# Patient Record
Sex: FEMALE | Race: WHITE | NOT HISPANIC OR LATINO | Employment: OTHER | URBAN - METROPOLITAN AREA
[De-identification: names, ages, dates, MRNs, and addresses within clinical notes are randomized per-mention and may not be internally consistent; named-entity substitution may affect disease eponyms.]

---

## 2017-01-10 ENCOUNTER — RESULTS ENCOUNTER (OUTPATIENT)
Dept: FAMILY MEDICINE CLINIC | Facility: CLINIC | Age: 62
End: 2017-01-10

## 2017-01-10 DIAGNOSIS — M85.80 OSTEOPENIA: ICD-10-CM

## 2017-01-10 DIAGNOSIS — K21.9 GASTROESOPHAGEAL REFLUX DISEASE WITHOUT ESOPHAGITIS: ICD-10-CM

## 2017-01-10 DIAGNOSIS — Z85.3 PERSONAL HISTORY OF MALIGNANT NEOPLASM OF BREAST: ICD-10-CM

## 2017-01-10 DIAGNOSIS — E78.5 HYPERLIPIDEMIA: ICD-10-CM

## 2017-01-10 DIAGNOSIS — N28.9 RENAL INSUFFICIENCY: ICD-10-CM

## 2017-01-10 DIAGNOSIS — R11.0 NAUSEA: ICD-10-CM

## 2017-02-06 LAB
25(OH)D3+25(OH)D2 SERPL-MCNC: 50.2 NG/ML (ref 30–100)
BUN SERPL-MCNC: 18 MG/DL (ref 8–23)
BUN/CREAT SERPL: 18.8 (ref 7–25)
CALCIUM SERPL-MCNC: 10.1 MG/DL (ref 8.6–10.5)
CHLORIDE SERPL-SCNC: 102 MMOL/L (ref 98–107)
CHOLEST SERPL-MCNC: 195 MG/DL (ref 0–200)
CO2 SERPL-SCNC: 26.3 MMOL/L (ref 22–29)
CREAT SERPL-MCNC: 0.96 MG/DL (ref 0.57–1)
GLUCOSE SERPL-MCNC: 91 MG/DL (ref 65–99)
HDLC SERPL-MCNC: 69 MG/DL (ref 40–60)
LDLC SERPL CALC-MCNC: 106 MG/DL (ref 0–100)
POTASSIUM SERPL-SCNC: 5 MMOL/L (ref 3.5–5.2)
SODIUM SERPL-SCNC: 142 MMOL/L (ref 136–145)
TRIGL SERPL-MCNC: 98 MG/DL (ref 0–150)
VLDLC SERPL CALC-MCNC: 19.6 MG/DL (ref 5–40)

## 2017-02-14 ENCOUNTER — OFFICE VISIT (OUTPATIENT)
Dept: FAMILY MEDICINE CLINIC | Facility: CLINIC | Age: 62
End: 2017-02-14

## 2017-02-14 VITALS
BODY MASS INDEX: 23.56 KG/M2 | TEMPERATURE: 97.5 F | SYSTOLIC BLOOD PRESSURE: 114 MMHG | DIASTOLIC BLOOD PRESSURE: 72 MMHG | OXYGEN SATURATION: 94 % | HEART RATE: 76 BPM | RESPIRATION RATE: 16 BRPM | WEIGHT: 138 LBS | HEIGHT: 64 IN

## 2017-02-14 DIAGNOSIS — R11.0 NAUSEA: ICD-10-CM

## 2017-02-14 DIAGNOSIS — K21.9 GASTROESOPHAGEAL REFLUX DISEASE WITHOUT ESOPHAGITIS: ICD-10-CM

## 2017-02-14 DIAGNOSIS — E78.5 HYPERLIPIDEMIA, UNSPECIFIED HYPERLIPIDEMIA TYPE: ICD-10-CM

## 2017-02-14 DIAGNOSIS — Z23 IMMUNIZATION DUE: ICD-10-CM

## 2017-02-14 DIAGNOSIS — Z71.85 IMMUNIZATION COUNSELING: ICD-10-CM

## 2017-02-14 DIAGNOSIS — N28.9 RENAL INSUFFICIENCY: Primary | ICD-10-CM

## 2017-02-14 PROCEDURE — 99214 OFFICE O/P EST MOD 30 MIN: CPT | Performed by: FAMILY MEDICINE

## 2017-02-14 PROCEDURE — 90471 IMMUNIZATION ADMIN: CPT | Performed by: FAMILY MEDICINE

## 2017-02-14 PROCEDURE — 90736 HZV VACCINE LIVE SUBQ: CPT | Performed by: FAMILY MEDICINE

## 2017-02-14 RX ORDER — MELATONIN
1000 EVERY OTHER DAY
COMMUNITY

## 2017-02-14 RX ORDER — OMEPRAZOLE 20 MG/1
CAPSULE, DELAYED RELEASE ORAL
Qty: 90 CAPSULE | Refills: 1 | OUTPATIENT
Start: 2017-02-14

## 2017-02-14 RX ORDER — SODIUM BICARBONATE 650 MG/1
650 TABLET ORAL 2 TIMES DAILY
Qty: 180 TABLET | Refills: 1 | Status: SHIPPED | OUTPATIENT
Start: 2017-02-14 | End: 2017-09-12 | Stop reason: SDUPTHER

## 2017-02-14 RX ORDER — OMEPRAZOLE 20 MG/1
20 CAPSULE, DELAYED RELEASE ORAL DAILY
Qty: 90 CAPSULE | Refills: 1 | Status: SHIPPED | OUTPATIENT
Start: 2017-02-14 | End: 2017-08-13 | Stop reason: SDUPTHER

## 2017-02-14 RX ORDER — PROMETHAZINE HYDROCHLORIDE 25 MG/1
25 TABLET ORAL AS NEEDED
Qty: 20 TABLET | Refills: 1 | Status: SHIPPED | OUTPATIENT
Start: 2017-02-14 | End: 2017-05-09

## 2017-02-14 NOTE — PROGRESS NOTES
"Subjective   Thuy Blackwood is a 61 y.o. female.     History of Present Illness   Chief Complaint:   Chief Complaint   Patient presents with   • Heartburn   • Hyperlipidemia   • Renal Insufficiency   • Nausea       Thuy Blackwood 61 y.o. female who presents today for Medical Management of the below listed issues and medication refills  Patient needs shingle shot today. I reviewed her lab results  she has a history of   Patient Active Problem List   Diagnosis   • Hyperlipidemia   • Osteopenia   • History of left breast cancer   • Renal insufficiency   .  Since the last visit, she has overall felt well.  she has been compliant with   Current Outpatient Prescriptions:   •  Calcium Carb-Cholecalciferol (CALCIUM 500 +D PO), Take 500 mg by mouth daily., Disp: , Rfl:   •  Multiple Vitamin (MULTI-VITAMIN DAILY PO), Take by mouth daily., Disp: , Rfl:   •  omeprazole (PriLOSEC) 20 MG capsule, Take 1 capsule by mouth daily., Disp: 90 capsule, Rfl: 1  •  promethazine (PHENERGAN) 25 MG tablet, TAKE 1 TABLET BY MOUTH DAILY AS NEEDED FOR NAUSEA OR VOMITING., Disp: 20 tablet, Rfl: 1  •  sodium bicarbonate 650 MG tablet, Take 1 tablet by mouth 2 (two) times a day., Disp: 180 tablet, Rfl: 1.  she denies medication side effects.    All of the chronic condition(s) listed above are stable w/o issues.    Visit Vitals   • /72   • Pulse 76   • Temp 97.5 °F (36.4 °C) (Oral)   • Resp 16   • Ht 64.17\" (163 cm)   • Wt 138 lb (62.6 kg)   • SpO2 94%   • BMI 23.56 kg/m2       Results for orders placed or performed in visit on 01/10/17   BMP8+eGFR ([facility] Only)   Result Value Ref Range    Glucose 91 65 - 99 mg/dL    BUN 18 8 - 23 mg/dL    Creatinine 0.96 0.57 - 1.00 mg/dL    eGFR Non African Am 59 (L) >60 mL/min/1.73    eGFR African Am 71 >60 mL/min/1.73    BUN/Creatinine Ratio 18.8 7.0 - 25.0    Sodium 142 136 - 145 mmol/L    Potassium 5.0 3.5 - 5.2 mmol/L    Chloride 102 98 - 107 mmol/L    Total CO2 26.3 22.0 - 29.0 mmol/L    " Calcium 10.1 8.6 - 10.5 mg/dL   Vitamin D 25 hydroxy   Result Value Ref Range    25 Hydroxy, Vitamin D 50.2 30.0 - 100.0 ng/mL   Lipid panel   Result Value Ref Range    Total Cholesterol 195 0 - 200 mg/dL    Triglycerides 98 0 - 150 mg/dL    HDL Cholesterol 69 (H) 40 - 60 mg/dL    VLDL Cholesterol 19.6 5 - 40 mg/dL    LDL Cholesterol  106 (H) 0 - 100 mg/dL         The following portions of the patient's history were reviewed and updated as appropriate: allergies, current medications, past family history, past medical history, past social history, past surgical history and problem list.    Review of Systems   Constitutional: Negative for activity change, appetite change and unexpected weight change.   Eyes: Negative for visual disturbance.   Respiratory: Negative for chest tightness and shortness of breath.    Cardiovascular: Negative for chest pain and palpitations.   Skin: Negative for color change.   Neurological: Negative for syncope and speech difficulty.   Psychiatric/Behavioral: Negative for confusion and decreased concentration.       Objective   Physical Exam   Constitutional: She is oriented to person, place, and time. She appears well-developed and well-nourished.   HENT:   Head: Normocephalic and atraumatic.   Nose: Nose normal.   Mouth/Throat: Oropharynx is clear and moist.   Eyes: EOM are normal. Pupils are equal, round, and reactive to light.   Neck: Normal range of motion. Neck supple. No thyromegaly present.   Cardiovascular: Normal rate and regular rhythm.    Pulmonary/Chest: Effort normal and breath sounds normal. She has no rales.   Abdominal: Soft. Bowel sounds are normal.   Musculoskeletal: Normal range of motion.   Neurological: She is alert and oriented to person, place, and time.   Skin: Skin is warm. No rash noted.   Psychiatric: She has a normal mood and affect. Her behavior is normal.   Nursing note and vitals reviewed.      Assessment/Plan   Thuy was seen today for heartburn,  hyperlipidemia, renal insufficiency and nausea.    Diagnoses and all orders for this visit:    Renal insufficiency  -     sodium bicarbonate 650 MG tablet; Take 1 tablet by mouth 2 (Two) Times a Day.  -     Comprehensive metabolic panel; Future  -     Lipid panel; Future  -     CBC and Differential; Future  -     TSH; Future    Gastroesophageal reflux disease without esophagitis  -     omeprazole (priLOSEC) 20 MG capsule; Take 1 capsule by mouth Daily.  -     Comprehensive metabolic panel; Future  -     Lipid panel; Future  -     CBC and Differential; Future  -     TSH; Future    Hyperlipidemia, unspecified hyperlipidemia type  -     Comprehensive metabolic panel; Future  -     Lipid panel; Future  -     CBC and Differential; Future  -     TSH; Future    Nausea  -     promethazine (PHENERGAN) 25 MG tablet; Take 1 tablet by mouth As Needed for nausea or vomiting.  -     Comprehensive metabolic panel; Future  -     Lipid panel; Future  -     CBC and Differential; Future  -     TSH; Future    Immunization counseling

## 2017-05-02 ENCOUNTER — OFFICE VISIT (OUTPATIENT)
Dept: FAMILY MEDICINE CLINIC | Facility: CLINIC | Age: 62
End: 2017-05-02

## 2017-05-02 VITALS
TEMPERATURE: 98.1 F | BODY MASS INDEX: 23.73 KG/M2 | WEIGHT: 139 LBS | OXYGEN SATURATION: 99 % | SYSTOLIC BLOOD PRESSURE: 140 MMHG | DIASTOLIC BLOOD PRESSURE: 80 MMHG | RESPIRATION RATE: 16 BRPM | HEIGHT: 64 IN | HEART RATE: 98 BPM

## 2017-05-02 DIAGNOSIS — J18.9 PNEUMONIA OF LEFT LOWER LOBE DUE TO INFECTIOUS ORGANISM: ICD-10-CM

## 2017-05-02 DIAGNOSIS — R11.0 NAUSEA: ICD-10-CM

## 2017-05-02 DIAGNOSIS — J06.9 ACUTE URI: Primary | ICD-10-CM

## 2017-05-02 DIAGNOSIS — R05.9 COUGH: ICD-10-CM

## 2017-05-02 DIAGNOSIS — R51.9 HEADACHE, UNSPECIFIED HEADACHE TYPE: ICD-10-CM

## 2017-05-02 DIAGNOSIS — R53.83 OTHER FATIGUE: ICD-10-CM

## 2017-05-02 DIAGNOSIS — J32.0 MAXILLARY SINUSITIS, UNSPECIFIED CHRONICITY: ICD-10-CM

## 2017-05-02 LAB
EXPIRATION DATE: NORMAL
FLUAV AG NPH QL: NEGATIVE
FLUBV AG NPH QL: NEGATIVE
INTERNAL CONTROL: NORMAL
Lab: NORMAL

## 2017-05-02 PROCEDURE — 71020 XR CHEST PA AND LATERAL: CPT | Performed by: FAMILY MEDICINE

## 2017-05-02 PROCEDURE — 99214 OFFICE O/P EST MOD 30 MIN: CPT | Performed by: FAMILY MEDICINE

## 2017-05-02 PROCEDURE — 87804 INFLUENZA ASSAY W/OPTIC: CPT | Performed by: FAMILY MEDICINE

## 2017-05-02 RX ORDER — CEFDINIR 300 MG/1
300 CAPSULE ORAL 2 TIMES DAILY
Qty: 20 CAPSULE | Refills: 0 | Status: SHIPPED | OUTPATIENT
Start: 2017-05-02 | End: 2017-09-12

## 2017-05-09 ENCOUNTER — OFFICE VISIT (OUTPATIENT)
Dept: FAMILY MEDICINE CLINIC | Facility: CLINIC | Age: 62
End: 2017-05-09

## 2017-05-09 VITALS
SYSTOLIC BLOOD PRESSURE: 111 MMHG | HEIGHT: 64 IN | OXYGEN SATURATION: 99 % | DIASTOLIC BLOOD PRESSURE: 59 MMHG | BODY MASS INDEX: 23.56 KG/M2 | HEART RATE: 84 BPM | TEMPERATURE: 97 F | WEIGHT: 138 LBS | RESPIRATION RATE: 16 BRPM

## 2017-05-09 DIAGNOSIS — J40 BRONCHITIS: Primary | ICD-10-CM

## 2017-05-09 PROCEDURE — 99213 OFFICE O/P EST LOW 20 MIN: CPT | Performed by: FAMILY MEDICINE

## 2017-05-09 RX ORDER — ONDANSETRON 4 MG/1
TABLET, FILM COATED ORAL
Refills: 1 | COMMUNITY
Start: 2017-05-04 | End: 2017-05-09

## 2017-06-19 DIAGNOSIS — R11.0 NAUSEA: ICD-10-CM

## 2017-06-19 RX ORDER — PROMETHAZINE HYDROCHLORIDE 25 MG/1
TABLET ORAL
Qty: 20 TABLET | Refills: 1 | OUTPATIENT
Start: 2017-06-19

## 2017-07-11 ENCOUNTER — APPOINTMENT (OUTPATIENT)
Dept: WOMENS IMAGING | Facility: HOSPITAL | Age: 62
End: 2017-07-11

## 2017-07-11 PROCEDURE — G0202 SCR MAMMO BI INCL CAD: HCPCS | Performed by: RADIOLOGY

## 2017-07-11 PROCEDURE — MDREVIEWSP: Performed by: RADIOLOGY

## 2017-07-11 PROCEDURE — 77063 BREAST TOMOSYNTHESIS BI: CPT | Performed by: RADIOLOGY

## 2017-07-11 PROCEDURE — 77067 SCR MAMMO BI INCL CAD: CPT | Performed by: RADIOLOGY

## 2017-07-11 PROCEDURE — 76641 ULTRASOUND BREAST COMPLETE: CPT | Performed by: RADIOLOGY

## 2017-07-14 ENCOUNTER — RESULTS ENCOUNTER (OUTPATIENT)
Dept: FAMILY MEDICINE CLINIC | Facility: CLINIC | Age: 62
End: 2017-07-14

## 2017-07-14 DIAGNOSIS — E78.5 HYPERLIPIDEMIA, UNSPECIFIED HYPERLIPIDEMIA TYPE: ICD-10-CM

## 2017-07-14 DIAGNOSIS — N28.9 RENAL INSUFFICIENCY: ICD-10-CM

## 2017-07-14 DIAGNOSIS — K21.9 GASTROESOPHAGEAL REFLUX DISEASE WITHOUT ESOPHAGITIS: ICD-10-CM

## 2017-07-14 DIAGNOSIS — R11.0 NAUSEA: ICD-10-CM

## 2017-08-13 DIAGNOSIS — K21.9 GASTROESOPHAGEAL REFLUX DISEASE WITHOUT ESOPHAGITIS: ICD-10-CM

## 2017-08-15 RX ORDER — OMEPRAZOLE 20 MG/1
CAPSULE, DELAYED RELEASE ORAL
Qty: 30 CAPSULE | Refills: 0 | Status: SHIPPED | OUTPATIENT
Start: 2017-08-15 | End: 2018-03-12 | Stop reason: SDUPTHER

## 2017-09-05 LAB
ALBUMIN SERPL-MCNC: 4.8 G/DL (ref 3.5–5.2)
ALBUMIN/GLOB SERPL: 1.8 G/DL
ALP SERPL-CCNC: 58 U/L (ref 39–117)
ALT SERPL-CCNC: 15 U/L (ref 1–33)
AST SERPL-CCNC: 22 U/L (ref 1–32)
BASOPHILS # BLD AUTO: 0.03 10*3/MM3 (ref 0–0.2)
BASOPHILS NFR BLD AUTO: 0.6 % (ref 0–1.5)
BILIRUB SERPL-MCNC: 0.5 MG/DL (ref 0.1–1.2)
BUN SERPL-MCNC: 17 MG/DL (ref 8–23)
BUN/CREAT SERPL: 17 (ref 7–25)
CALCIUM SERPL-MCNC: 10.3 MG/DL (ref 8.6–10.5)
CHLORIDE SERPL-SCNC: 100 MMOL/L (ref 98–107)
CHOLEST SERPL-MCNC: 211 MG/DL (ref 0–200)
CO2 SERPL-SCNC: 23.7 MMOL/L (ref 22–29)
CREAT SERPL-MCNC: 1 MG/DL (ref 0.57–1)
EOSINOPHIL # BLD AUTO: 0.2 10*3/MM3 (ref 0–0.7)
EOSINOPHIL NFR BLD AUTO: 4 % (ref 0.3–6.2)
ERYTHROCYTE [DISTWIDTH] IN BLOOD BY AUTOMATED COUNT: 13.4 % (ref 11.7–13)
GLOBULIN SER CALC-MCNC: 2.6 GM/DL
GLUCOSE SERPL-MCNC: 81 MG/DL (ref 65–99)
HCT VFR BLD AUTO: 39.5 % (ref 35.6–45.5)
HDLC SERPL-MCNC: 59 MG/DL (ref 40–60)
HGB BLD-MCNC: 12.7 G/DL (ref 11.9–15.5)
IMM GRANULOCYTES # BLD: 0 10*3/MM3 (ref 0–0.03)
IMM GRANULOCYTES NFR BLD: 0 % (ref 0–0.5)
LDLC SERPL CALC-MCNC: 132 MG/DL (ref 0–100)
LYMPHOCYTES # BLD AUTO: 0.99 10*3/MM3 (ref 0.9–4.8)
LYMPHOCYTES NFR BLD AUTO: 19.9 % (ref 19.6–45.3)
MCH RBC QN AUTO: 31.6 PG (ref 26.9–32)
MCHC RBC AUTO-ENTMCNC: 32.2 G/DL (ref 32.4–36.3)
MCV RBC AUTO: 98.3 FL (ref 80.5–98.2)
MONOCYTES # BLD AUTO: 0.46 10*3/MM3 (ref 0.2–1.2)
MONOCYTES NFR BLD AUTO: 9.2 % (ref 5–12)
NEUTROPHILS # BLD AUTO: 3.3 10*3/MM3 (ref 1.9–8.1)
NEUTROPHILS NFR BLD AUTO: 66.3 % (ref 42.7–76)
PLATELET # BLD AUTO: 287 10*3/MM3 (ref 140–500)
POTASSIUM SERPL-SCNC: 4.4 MMOL/L (ref 3.5–5.2)
PROT SERPL-MCNC: 7.4 G/DL (ref 6–8.5)
RBC # BLD AUTO: 4.02 10*6/MM3 (ref 3.9–5.2)
SODIUM SERPL-SCNC: 138 MMOL/L (ref 136–145)
TRIGL SERPL-MCNC: 102 MG/DL (ref 0–150)
TSH SERPL DL<=0.005 MIU/L-ACNC: 1.89 MIU/ML (ref 0.27–4.2)
VLDLC SERPL CALC-MCNC: 20.4 MG/DL (ref 5–40)
WBC # BLD AUTO: 4.98 10*3/MM3 (ref 4.5–10.7)

## 2017-09-12 ENCOUNTER — OFFICE VISIT (OUTPATIENT)
Dept: FAMILY MEDICINE CLINIC | Facility: CLINIC | Age: 62
End: 2017-09-12

## 2017-09-12 VITALS
HEART RATE: 80 BPM | BODY MASS INDEX: 23.39 KG/M2 | WEIGHT: 137 LBS | TEMPERATURE: 97.6 F | HEIGHT: 64 IN | RESPIRATION RATE: 16 BRPM | OXYGEN SATURATION: 98 % | DIASTOLIC BLOOD PRESSURE: 62 MMHG | SYSTOLIC BLOOD PRESSURE: 109 MMHG

## 2017-09-12 DIAGNOSIS — N28.9 RENAL INSUFFICIENCY: Primary | ICD-10-CM

## 2017-09-12 DIAGNOSIS — R11.0 NAUSEA: ICD-10-CM

## 2017-09-12 DIAGNOSIS — K21.9 GASTROESOPHAGEAL REFLUX DISEASE WITHOUT ESOPHAGITIS: ICD-10-CM

## 2017-09-12 PROCEDURE — 99214 OFFICE O/P EST MOD 30 MIN: CPT | Performed by: FAMILY MEDICINE

## 2017-09-12 RX ORDER — SODIUM BICARBONATE 650 MG/1
650 TABLET ORAL 2 TIMES DAILY
Qty: 180 TABLET | Refills: 1 | Status: SHIPPED | OUTPATIENT
Start: 2017-09-12 | End: 2018-03-12 | Stop reason: SDUPTHER

## 2017-09-12 RX ORDER — PROMETHAZINE HYDROCHLORIDE 25 MG/1
TABLET ORAL
Qty: 56 TABLET | Refills: 1 | Status: SHIPPED | OUTPATIENT
Start: 2017-09-12 | End: 2018-03-12 | Stop reason: SDUPTHER

## 2017-09-12 RX ORDER — OMEPRAZOLE 20 MG/1
20 CAPSULE, DELAYED RELEASE ORAL DAILY
Qty: 90 CAPSULE | Refills: 1 | Status: CANCELLED | OUTPATIENT
Start: 2017-09-12

## 2017-09-12 RX ORDER — PROMETHAZINE HYDROCHLORIDE 25 MG/1
TABLET ORAL
Refills: 1 | COMMUNITY
Start: 2017-06-19 | End: 2017-09-12 | Stop reason: SDUPTHER

## 2017-09-14 DIAGNOSIS — N28.9 RENAL INSUFFICIENCY: ICD-10-CM

## 2017-09-14 RX ORDER — SODIUM BICARBONATE 650 MG/1
TABLET ORAL
Qty: 180 TABLET | Refills: 1 | OUTPATIENT
Start: 2017-09-14

## 2017-10-19 ENCOUNTER — FLU SHOT (OUTPATIENT)
Dept: FAMILY MEDICINE CLINIC | Facility: CLINIC | Age: 62
End: 2017-10-19

## 2017-10-19 PROCEDURE — 90471 IMMUNIZATION ADMIN: CPT | Performed by: FAMILY MEDICINE

## 2017-10-19 PROCEDURE — 90686 IIV4 VACC NO PRSV 0.5 ML IM: CPT | Performed by: FAMILY MEDICINE

## 2018-01-31 ENCOUNTER — LAB (OUTPATIENT)
Dept: LAB | Facility: HOSPITAL | Age: 63
End: 2018-01-31

## 2018-01-31 ENCOUNTER — OFFICE VISIT (OUTPATIENT)
Dept: ONCOLOGY | Facility: CLINIC | Age: 63
End: 2018-01-31

## 2018-01-31 VITALS
SYSTOLIC BLOOD PRESSURE: 128 MMHG | TEMPERATURE: 97.8 F | RESPIRATION RATE: 16 BRPM | DIASTOLIC BLOOD PRESSURE: 80 MMHG | HEIGHT: 65 IN | HEART RATE: 80 BPM | OXYGEN SATURATION: 100 % | WEIGHT: 140.4 LBS | BODY MASS INDEX: 23.39 KG/M2

## 2018-01-31 DIAGNOSIS — Z85.3 HISTORY OF LEFT BREAST CANCER: Primary | ICD-10-CM

## 2018-01-31 LAB
BASOPHILS # BLD AUTO: 0.05 10*3/MM3 (ref 0–0.1)
BASOPHILS NFR BLD AUTO: 1.1 % (ref 0–1.1)
DEPRECATED RDW RBC AUTO: 44.1 FL (ref 37–49)
EOSINOPHIL # BLD AUTO: 0.15 10*3/MM3 (ref 0–0.36)
EOSINOPHIL NFR BLD AUTO: 3.2 % (ref 1–5)
ERYTHROCYTE [DISTWIDTH] IN BLOOD BY AUTOMATED COUNT: 12.7 % (ref 11.7–14.5)
HCT VFR BLD AUTO: 40.7 % (ref 34–45)
HGB BLD-MCNC: 13.7 G/DL (ref 11.5–14.9)
IMM GRANULOCYTES # BLD: 0.02 10*3/MM3 (ref 0–0.03)
IMM GRANULOCYTES NFR BLD: 0.4 % (ref 0–0.5)
LYMPHOCYTES # BLD AUTO: 1.11 10*3/MM3 (ref 1–3.5)
LYMPHOCYTES NFR BLD AUTO: 23.4 % (ref 20–49)
MCH RBC QN AUTO: 31.6 PG (ref 27–33)
MCHC RBC AUTO-ENTMCNC: 33.7 G/DL (ref 32–35)
MCV RBC AUTO: 93.8 FL (ref 83–97)
MONOCYTES # BLD AUTO: 0.51 10*3/MM3 (ref 0.25–0.8)
MONOCYTES NFR BLD AUTO: 10.8 % (ref 4–12)
NEUTROPHILS # BLD AUTO: 2.9 10*3/MM3 (ref 1.5–7)
NEUTROPHILS NFR BLD AUTO: 61.1 % (ref 39–75)
NRBC BLD MANUAL-RTO: 0 /100 WBC (ref 0–0)
PLATELET # BLD AUTO: 275 10*3/MM3 (ref 150–375)
PMV BLD AUTO: 9.5 FL (ref 8.9–12.1)
RBC # BLD AUTO: 4.34 10*6/MM3 (ref 3.9–5)
WBC NRBC COR # BLD: 4.74 10*3/MM3 (ref 4–10)

## 2018-01-31 PROCEDURE — 99213 OFFICE O/P EST LOW 20 MIN: CPT | Performed by: INTERNAL MEDICINE

## 2018-01-31 PROCEDURE — 36416 COLLJ CAPILLARY BLOOD SPEC: CPT | Performed by: INTERNAL MEDICINE

## 2018-01-31 PROCEDURE — 85025 COMPLETE CBC W/AUTO DIFF WBC: CPT | Performed by: INTERNAL MEDICINE

## 2018-01-31 NOTE — PROGRESS NOTES
REASON FOR FOLLOWUP:  History of left breast cancer 27 years ago.  Left chest wall remains normal.  Right breast examination is normal.          HISTORY OF PRESENT ILLNESS:  The patient returns today to the office for followup.    Dr. Jones continues checking her creatinine twice a year, this being stable. The patient, otherwise, has no other problems.        PAST MEDICAL HISTORY:  Menarche age 9.   3, para 2, miscarriage 1.  Pregnancy  age 26.  She took birth control pills on and off for 10 years and underwent bilateral tubal ligation in .  Hysterectomy with ovaries in situ.        Bone density test review on 2009 documented further decrease in bone density of almost 2.5% bone loss throughout her skeleton.  W  ith this in mind we advised her to undergo evaluation for osteoporosis that would include a magnesium phosphorus, vitamin D level and serum protein electrophoresis and sedimentation rate.  The patient acknowledged that she does not take any calcium or vit  amin D.  Her diet is balanced though.          She developed renal insufficiency with creatinine of 3.0 in 2010, and inositoluria.  Doppler study of the left kidney showed minimal decrease in size, with normal perfusion through both kidneys.          Laparoscopic cholecystectomy performed in 2010.  Pathology showed chronic cholecystitis.  Upper and lower GI endoscopies performed by Dr. Lawton in 2010 showed gastritis but no other pathological diagnosis made.          HEMATOLOGIC/ONCOLOGIC HISTORY: (History from previous dates can be found in the separate document.)        .           MEDICATIONS:  T  he current medication list was reviewed with the patient and updated in the EMR this date per the medical assistant.  Medication dosages and frequencies were confirmed to be accurate.           ALLERGIES:   None.          SOCIAL HISTORY:  .  Otherwise unchanged.          REVIEW OF SYSTEMS:      PAIN:  See  "VITAL SIGNS below.     GENERAL:  No change in appetite or weight, no fevers, chills or sweats.     SKIN:  No rashes or nonhealing lesions.    HEME/LYMPH:  No anemia, easy bruising, bleeding or swollen nodes.    EYES:  No vision changes or diplopia.      ENT:  No tinnitus, hearing loss, gum bleeding, epistaxis, hoarseness or dysphagia.    RESPIRATORY:  No cough, shortness of breath, hemoptysis or wheezing.    CVS:  No chest pain, palpitations, orthopnea, dyspnea on exertion or PND.    GI:  No abdominal pain, nausea, vomiting, constipation, diarrhea, melena or hematochezia.    :  No dysuria or hematuria.  No abnormal vaginal discharge or bleeding.     MUSCULOSKELETAL:  No bone pain or joint stiffness.  Bursitis both hips  NEUROLOGICAL:  No dizziness, global weakness, loss of consciousness or seizures.    PSYCHIATRIC:  No increased nervousness, mood changes or depression.        VITAL SIGNS:    Vitals:    18 0952   BP: 128/80   Pulse: 80   Resp: 16   Temp: 97.8 °F (36.6 °C)   TempSrc: Oral   SpO2: 100%   Weight: 63.7 kg (140 lb 6.4 oz)   Height: 165.7 cm (65.24\")          PAIN:  0 out of 10     ECO        PHYSICAL EXAMINATION:      GENERAL:  Well-developed, well-nourished female in no acute distress.    SKIN:  Warm, dry without rashes, purpura or petechiae.    HEAD:  Normocephalic.    EYES:  Pupils equal, round and reactive to light.  EOMs intact.  Conjunctivae normal.    EARS:  Hearing intact.    NOSE:  Septum midline.  No excoriations or nasal discharge.    MOUTH:  Tongue is well-papillated; no stomatitis or ulcers.  Lips normal.    THROAT:  Oropharynx without lesions or exudates.    NECK:  Supple with good range of motion; no thyromegaly or masses, no JVD or bruits.    LYMPHATICS:  No cervical, supraclavicular, axillary or inguinal adenopathy.    BREASTS:  Right breast exam disclosed normal nipple, normal skin; no palpable masses; no tenderness; normal right axilla.  Left mastectomy site is completely " healed; normal left axilla.      CHEST:  Lungs clear to percussion and auscultation.    CARDIAC:  Regular rate and rhythm without murmurs, rubs or gallops.    ABDOMEN:  Soft, nontender with no organomegaly or masses.    EXTREMITIES:  No clubbing, cyanosis or edema.    NEUROLOGICAL:  No focal neurological deficits.        LABORATORY DATA: CBC Auto Differential   Order: [id number] - Part of Panel Order [id number]   Status:  Final result   Visible to patient:  No (Not Released) Dx:  History of left breast cancer      Ref Range & Units 9:40 AM     WBC 4.00 - 10.00 10*3/mm3 4.74   RBC 3.90 - 5.00 10*6/mm3 4.34   Hemoglobin 11.5 - 14.9 g/dL 13.7   Hematocrit 34.0 - 45.0 % 40.7   MCV 83.0 - 97.0 fL 93.8   MCH 27.0 - 33.0 pg 31.6   MCHC 32.0 - 35.0 g/dL 33.7   RDW 11.7 - 14.5 % 12.7   RDW-SD 37.0 - 49.0 fl 44.1   MPV 8.9 - 12.1 fL 9.5   Platelets 150 - 375 10*3/mm3 275   Neutrophil % 39.0 - 75.0 % 61.1   Lymphocyte % 20.0 - 49.0 % 23.4   Monocyte % 4.0 - 12.0 % 10.8   Eosinophil % 1.0 - 5.0 % 3.2   Basophil % 0.0 - 1.1 % 1.1   Immature Grans % 0.0 - 0.5 % 0.4   Neutrophils, Absolute 1.50 - 7.00 10*3/mm3 2.90   Lymphocytes, Absolute 1.00 - 3.50 10*3/mm3 1.11   Monocytes, Absolute 0.25 - 0.80 10*3/mm3 0.51           MAMMOGRAM 7/11/17 NORMAL              ASSESSMENT: 1. This patient has a history of left breast cancer 27 years ago.  Normal breast examination today.  Mammogram and ultrasound  have been normal.  The most recent creatinine in the summer was 1.  The patient is doing fantastic, she has no evidence of breast cancer recurrence and neither developing a new breast cancer in the right breast.  Her overall health is wonderful and the patient is doing fine.      RECOMMENDATIONS:  She will be reviewed back in a year, she will have a repeat mammogram in the summer and other than that no other intervention.  Eventually, we will need to collect Cologuard on her because her colonoscopies are always nightmares in regard to the  preparation and maybe she is better off doing this methodology for screening purposes from the point of view of colon cancer.  Her previous colonoscopy was close to 6 or 7 years ago.

## 2018-02-03 DIAGNOSIS — R11.0 NAUSEA: ICD-10-CM

## 2018-02-05 RX ORDER — PROMETHAZINE HYDROCHLORIDE 25 MG/1
TABLET ORAL
Qty: 56 TABLET | Refills: 0 | OUTPATIENT
Start: 2018-02-05

## 2018-02-12 ENCOUNTER — RESULTS ENCOUNTER (OUTPATIENT)
Dept: FAMILY MEDICINE CLINIC | Facility: CLINIC | Age: 63
End: 2018-02-12

## 2018-02-12 DIAGNOSIS — N28.9 RENAL INSUFFICIENCY: ICD-10-CM

## 2018-02-12 DIAGNOSIS — R11.0 NAUSEA: ICD-10-CM

## 2018-02-12 DIAGNOSIS — K21.9 GASTROESOPHAGEAL REFLUX DISEASE WITHOUT ESOPHAGITIS: ICD-10-CM

## 2018-03-06 LAB
ALBUMIN SERPL-MCNC: 4.5 G/DL (ref 3.5–5.2)
ALBUMIN/GLOB SERPL: 1.9 G/DL
ALP SERPL-CCNC: 52 U/L (ref 39–117)
ALT SERPL-CCNC: 17 U/L (ref 1–33)
AST SERPL-CCNC: 22 U/L (ref 1–32)
BILIRUB SERPL-MCNC: 0.5 MG/DL (ref 0.1–1.2)
BUN SERPL-MCNC: 20 MG/DL (ref 8–23)
BUN/CREAT SERPL: 21.7 (ref 7–25)
CALCIUM SERPL-MCNC: 9.7 MG/DL (ref 8.6–10.5)
CHLORIDE SERPL-SCNC: 101 MMOL/L (ref 98–107)
CHOLEST SERPL-MCNC: 217 MG/DL (ref 0–200)
CO2 SERPL-SCNC: 31.4 MMOL/L (ref 22–29)
CREAT SERPL-MCNC: 0.92 MG/DL (ref 0.57–1)
GFR SERPLBLD CREATININE-BSD FMLA CKD-EPI: 61 ML/MIN/1.73
GFR SERPLBLD CREATININE-BSD FMLA CKD-EPI: 74 ML/MIN/1.73
GLOBULIN SER CALC-MCNC: 2.4 GM/DL
GLUCOSE SERPL-MCNC: 87 MG/DL (ref 65–99)
HDLC SERPL-MCNC: 62 MG/DL (ref 40–60)
LDLC SERPL CALC-MCNC: 131 MG/DL (ref 0–100)
POTASSIUM SERPL-SCNC: 4.6 MMOL/L (ref 3.5–5.2)
PROT SERPL-MCNC: 6.9 G/DL (ref 6–8.5)
SODIUM SERPL-SCNC: 140 MMOL/L (ref 136–145)
TRIGL SERPL-MCNC: 118 MG/DL (ref 0–150)
VLDLC SERPL CALC-MCNC: 23.6 MG/DL (ref 5–40)

## 2018-03-12 ENCOUNTER — OFFICE VISIT (OUTPATIENT)
Dept: FAMILY MEDICINE CLINIC | Facility: CLINIC | Age: 63
End: 2018-03-12

## 2018-03-12 VITALS
OXYGEN SATURATION: 94 % | DIASTOLIC BLOOD PRESSURE: 70 MMHG | BODY MASS INDEX: 23.49 KG/M2 | RESPIRATION RATE: 16 BRPM | TEMPERATURE: 97.6 F | HEIGHT: 65 IN | HEART RATE: 83 BPM | WEIGHT: 141 LBS | SYSTOLIC BLOOD PRESSURE: 126 MMHG

## 2018-03-12 DIAGNOSIS — N28.9 RENAL INSUFFICIENCY: ICD-10-CM

## 2018-03-12 DIAGNOSIS — K21.9 GASTROESOPHAGEAL REFLUX DISEASE WITHOUT ESOPHAGITIS: Primary | ICD-10-CM

## 2018-03-12 DIAGNOSIS — R11.0 NAUSEA: ICD-10-CM

## 2018-03-12 PROCEDURE — 99214 OFFICE O/P EST MOD 30 MIN: CPT | Performed by: FAMILY MEDICINE

## 2018-03-12 RX ORDER — OMEPRAZOLE 20 MG/1
20 CAPSULE, DELAYED RELEASE ORAL DAILY
Qty: 90 CAPSULE | Refills: 0 | Status: SHIPPED | OUTPATIENT
Start: 2018-03-12 | End: 2018-06-07 | Stop reason: SDUPTHER

## 2018-03-12 RX ORDER — PROMETHAZINE HYDROCHLORIDE 25 MG/1
TABLET ORAL
Qty: 56 TABLET | Refills: 1 | Status: SHIPPED | OUTPATIENT
Start: 2018-03-12 | End: 2018-09-14 | Stop reason: SDUPTHER

## 2018-03-12 RX ORDER — SODIUM BICARBONATE 650 MG/1
650 TABLET ORAL 2 TIMES DAILY
Qty: 180 TABLET | Refills: 1 | Status: SHIPPED | OUTPATIENT
Start: 2018-03-12 | End: 2018-09-14 | Stop reason: SDUPTHER

## 2018-03-12 NOTE — PROGRESS NOTES
"Subjective   Thuy Blackwood is a 62 y.o. female.     History of Present Illness   Chief Complaint:   Chief Complaint   Patient presents with   • Hyperlipidemia   • Heartburn       Thuy Blackwood 62 y.o. female who presents today for Medical Management of the below listed issues and medication refills. I reviewed her lab results.   she has a problem list of   Patient Active Problem List   Diagnosis   • Hyperlipidemia   • Osteopenia   • History of left breast cancer   • Renal insufficiency   .  Since the last visit, she has overall felt well.  she has been compliant with   Current Outpatient Prescriptions:   •  cholecalciferol (VITAMIN D3) 1000 UNITS tablet, Take 1,000 Units by mouth Every Other Day., Disp: , Rfl:   •  CINNAMON PO, Take  by mouth., Disp: , Rfl:   •  COCONUT OIL PO, Take  by mouth Daily., Disp: , Rfl:   •  MAGNESIUM-POTASSIUM PO, Take  by mouth 2 (Two) Times a Day As Needed., Disp: , Rfl:   •  Multiple Vitamin (MULTI-VITAMIN DAILY PO), Take by mouth daily., Disp: , Rfl:   •  omeprazole (priLOSEC) 20 MG capsule, TAKE 1 CAPSULE BY MOUTH DAILY. (Patient taking differently: TAKE 1 CAPSULE BY MOUTH EVERY OTHER DAY), Disp: 30 capsule, Rfl: 0  •  promethazine (PHENERGAN) 25 MG tablet, Take one tablet by mouth as needed for nausea and vomiting, Disp: 56 tablet, Rfl: 1  •  sodium bicarbonate 650 MG tablet, Take 1 tablet by mouth 2 (Two) Times a Day., Disp: 180 tablet, Rfl: 1.  she denies medication side effects.    All of the chronic condition(s) listed above are stable w/o issues.    /70   Pulse 83   Temp 97.6 °F (36.4 °C) (Oral)   Resp 16   Ht 165.7 cm (65.24\")   Wt 64 kg (141 lb)   SpO2 94%   BMI 23.29 kg/m²     Results for orders placed or performed in visit on 02/12/18   Comprehensive Metabolic Panel   Result Value Ref Range    Glucose 87 65 - 99 mg/dL    BUN 20 8 - 23 mg/dL    Creatinine 0.92 0.57 - 1.00 mg/dL    eGFR Non African Am 61 >60 mL/min/1.73    eGFR African Am 74 >60 " mL/min/1.73    BUN/Creatinine Ratio 21.7 7.0 - 25.0    Sodium 140 136 - 145 mmol/L    Potassium 4.6 3.5 - 5.2 mmol/L    Chloride 101 98 - 107 mmol/L    Total CO2 31.4 (H) 22.0 - 29.0 mmol/L    Calcium 9.7 8.6 - 10.5 mg/dL    Total Protein 6.9 6.0 - 8.5 g/dL    Albumin 4.50 3.50 - 5.20 g/dL    Globulin 2.4 gm/dL    A/G Ratio 1.9 g/dL    Total Bilirubin 0.5 0.1 - 1.2 mg/dL    Alkaline Phosphatase 52 39 - 117 U/L    AST (SGOT) 22 1 - 32 U/L    ALT (SGPT) 17 1 - 33 U/L   Lipid Panel   Result Value Ref Range    Total Cholesterol 217 (H) 0 - 200 mg/dL    Triglycerides 118 0 - 150 mg/dL    HDL Cholesterol 62 (H) 40 - 60 mg/dL    VLDL Cholesterol 23.6 5 - 40 mg/dL    LDL Cholesterol  131 (H) 0 - 100 mg/dL           The following portions of the patient's history were reviewed and updated as appropriate: allergies, current medications, past family history, past medical history, past social history, past surgical history and problem list.    Review of Systems   Constitutional: Negative for activity change and fatigue.   Eyes: Negative for visual disturbance.   Respiratory: Negative for chest tightness and shortness of breath.    Cardiovascular: Negative for chest pain and palpitations.   Gastrointestinal: Negative for abdominal distention and abdominal pain.   Skin: Negative for color change.   Neurological: Negative for seizures, syncope and speech difficulty.   Psychiatric/Behavioral: Negative for decreased concentration and depressed mood.       Objective   Physical Exam   Constitutional: She appears well-developed.   HENT:   Head: Normocephalic.   Eyes: EOM are normal. Pupils are equal, round, and reactive to light.   Neck: Normal range of motion.   Cardiovascular: Normal rate and regular rhythm.    Pulmonary/Chest: Effort normal and breath sounds normal.   Abdominal: Soft. Bowel sounds are normal.   Neurological: She is alert.   Skin: Skin is warm.   Psychiatric: She has a normal mood and affect.   Nursing note and  vitals reviewed.        Assessment/Plan   Thuy was seen today for hyperlipidemia and heartburn.    Diagnoses and all orders for this visit:    Gastroesophageal reflux disease without esophagitis  -     omeprazole (priLOSEC) 20 MG capsule; Take 1 capsule by mouth Daily.  -     Comprehensive metabolic panel; Future  -     Lipid panel; Future  -     CBC and Differential; Future  -     TSH; Future    Renal insufficiency  -     sodium bicarbonate 650 MG tablet; Take 1 tablet by mouth 2 (Two) Times a Day.  -     Comprehensive metabolic panel; Future  -     Lipid panel; Future  -     CBC and Differential; Future  -     TSH; Future    Nausea  -     promethazine (PHENERGAN) 25 MG tablet; Take one tablet by mouth as needed for nausea and vomiting  -     Comprehensive metabolic panel; Future  -     Lipid panel; Future  -     CBC and Differential; Future  -     TSH; Future

## 2018-06-07 DIAGNOSIS — K21.9 GASTROESOPHAGEAL REFLUX DISEASE WITHOUT ESOPHAGITIS: ICD-10-CM

## 2018-06-08 RX ORDER — OMEPRAZOLE 20 MG/1
20 CAPSULE, DELAYED RELEASE ORAL DAILY
Qty: 90 CAPSULE | Refills: 0 | Status: SHIPPED | OUTPATIENT
Start: 2018-06-08 | End: 2018-09-14 | Stop reason: SDUPTHER

## 2018-06-19 ENCOUNTER — CLINICAL SUPPORT (OUTPATIENT)
Dept: FAMILY MEDICINE CLINIC | Facility: CLINIC | Age: 63
End: 2018-06-19

## 2018-06-19 DIAGNOSIS — Z23 IMMUNIZATION DUE: Primary | ICD-10-CM

## 2018-06-19 PROCEDURE — 90471 IMMUNIZATION ADMIN: CPT | Performed by: FAMILY MEDICINE

## 2018-06-19 PROCEDURE — 90632 HEPA VACCINE ADULT IM: CPT | Performed by: FAMILY MEDICINE

## 2018-06-24 DIAGNOSIS — R11.0 NAUSEA: ICD-10-CM

## 2018-06-25 RX ORDER — PROMETHAZINE HYDROCHLORIDE 25 MG/1
TABLET ORAL
Qty: 56 TABLET | Refills: 1 | Status: SHIPPED | OUTPATIENT
Start: 2018-06-25 | End: 2018-09-14 | Stop reason: SDUPTHER

## 2018-06-28 ENCOUNTER — HOSPITAL ENCOUNTER (EMERGENCY)
Facility: HOSPITAL | Age: 63
Discharge: HOME | End: 2018-06-28
Attending: EMERGENCY MEDICINE | Admitting: EMERGENCY MEDICINE

## 2018-06-28 VITALS
HEIGHT: 65 IN | WEIGHT: 140 LBS | DIASTOLIC BLOOD PRESSURE: 70 MMHG | SYSTOLIC BLOOD PRESSURE: 121 MMHG | RESPIRATION RATE: 18 BRPM | HEART RATE: 78 BPM | OXYGEN SATURATION: 99 % | BODY MASS INDEX: 23.32 KG/M2 | TEMPERATURE: 99.6 F

## 2018-06-28 DIAGNOSIS — R50.9 FEVER IN ADULT: ICD-10-CM

## 2018-06-28 DIAGNOSIS — M79.10 MYALGIA: ICD-10-CM

## 2018-06-28 DIAGNOSIS — N39.0 ACUTE UTI: Primary | ICD-10-CM

## 2018-06-28 LAB
ALBUMIN SERPL-MCNC: 4.4 G/DL (ref 3.5–5.2)
ALBUMIN/GLOB SERPL: 1.4 G/DL
ALP SERPL-CCNC: 64 U/L (ref 39–117)
ALT SERPL W P-5'-P-CCNC: 15 U/L (ref 1–33)
ANION GAP SERPL CALCULATED.3IONS-SCNC: 14.2 MMOL/L
AST SERPL-CCNC: 19 U/L (ref 1–32)
BACTERIA UR QL AUTO: ABNORMAL /HPF
BASOPHILS # BLD AUTO: 0.02 10*3/MM3 (ref 0–0.2)
BASOPHILS NFR BLD AUTO: 0.3 % (ref 0–1.5)
BILIRUB SERPL-MCNC: 0.5 MG/DL (ref 0.1–1.2)
BILIRUB UR QL STRIP: NEGATIVE
BUN BLD-MCNC: 10 MG/DL (ref 8–23)
BUN/CREAT SERPL: 10.2 (ref 7–25)
CALCIUM SPEC-SCNC: 9.6 MG/DL (ref 8.6–10.5)
CHLORIDE SERPL-SCNC: 98 MMOL/L (ref 98–107)
CLARITY UR: ABNORMAL
CO2 SERPL-SCNC: 21.8 MMOL/L (ref 22–29)
COLOR UR: YELLOW
CREAT BLD-MCNC: 0.98 MG/DL (ref 0.57–1)
DEPRECATED RDW RBC AUTO: 45.6 FL (ref 37–54)
EOSINOPHIL # BLD AUTO: 0.65 10*3/MM3 (ref 0–0.7)
EOSINOPHIL NFR BLD AUTO: 8.3 % (ref 0.3–6.2)
ERYTHROCYTE [DISTWIDTH] IN BLOOD BY AUTOMATED COUNT: 13.1 % (ref 11.7–13)
GFR SERPL CREATININE-BSD FRML MDRD: 57 ML/MIN/1.73
GLOBULIN UR ELPH-MCNC: 3.1 GM/DL
GLUCOSE BLD-MCNC: 112 MG/DL (ref 65–99)
GLUCOSE UR STRIP-MCNC: NEGATIVE MG/DL
HCT VFR BLD AUTO: 39.8 % (ref 35.6–45.5)
HGB BLD-MCNC: 13.4 G/DL (ref 11.9–15.5)
HGB UR QL STRIP.AUTO: NEGATIVE
HYALINE CASTS UR QL AUTO: ABNORMAL /LPF
IMM GRANULOCYTES # BLD: 0.01 10*3/MM3 (ref 0–0.03)
IMM GRANULOCYTES NFR BLD: 0.1 % (ref 0–0.5)
KETONES UR QL STRIP: NEGATIVE
LEUKOCYTE ESTERASE UR QL STRIP.AUTO: ABNORMAL
LIPASE SERPL-CCNC: 31 U/L (ref 13–60)
LYMPHOCYTES # BLD AUTO: 0.58 10*3/MM3 (ref 0.9–4.8)
LYMPHOCYTES NFR BLD AUTO: 7.4 % (ref 19.6–45.3)
MCH RBC QN AUTO: 32.1 PG (ref 26.9–32)
MCHC RBC AUTO-ENTMCNC: 33.7 G/DL (ref 32.4–36.3)
MCV RBC AUTO: 95.2 FL (ref 80.5–98.2)
MONOCYTES # BLD AUTO: 0.63 10*3/MM3 (ref 0.2–1.2)
MONOCYTES NFR BLD AUTO: 8 % (ref 5–12)
NEUTROPHILS # BLD AUTO: 5.99 10*3/MM3 (ref 1.9–8.1)
NEUTROPHILS NFR BLD AUTO: 76 % (ref 42.7–76)
NITRITE UR QL STRIP: NEGATIVE
PH UR STRIP.AUTO: 8 [PH] (ref 5–8)
PLATELET # BLD AUTO: 319 10*3/MM3 (ref 140–500)
PMV BLD AUTO: 9.3 FL (ref 6–12)
POTASSIUM BLD-SCNC: 4.4 MMOL/L (ref 3.5–5.2)
PROT SERPL-MCNC: 7.5 G/DL (ref 6–8.5)
PROT UR QL STRIP: NEGATIVE
RBC # BLD AUTO: 4.18 10*6/MM3 (ref 3.9–5.2)
RBC # UR: ABNORMAL /HPF
REF LAB TEST METHOD: ABNORMAL
SODIUM BLD-SCNC: 134 MMOL/L (ref 136–145)
SP GR UR STRIP: 1.01 (ref 1–1.03)
SQUAMOUS #/AREA URNS HPF: ABNORMAL /HPF
UROBILINOGEN UR QL STRIP: ABNORMAL
WBC NRBC COR # BLD: 7.87 10*3/MM3 (ref 4.5–10.7)
WBC UR QL AUTO: ABNORMAL /HPF

## 2018-06-28 PROCEDURE — 99283 EMERGENCY DEPT VISIT LOW MDM: CPT

## 2018-06-28 PROCEDURE — 85025 COMPLETE CBC W/AUTO DIFF WBC: CPT | Performed by: PHYSICIAN ASSISTANT

## 2018-06-28 PROCEDURE — 83690 ASSAY OF LIPASE: CPT | Performed by: PHYSICIAN ASSISTANT

## 2018-06-28 PROCEDURE — 80053 COMPREHEN METABOLIC PANEL: CPT | Performed by: PHYSICIAN ASSISTANT

## 2018-06-28 PROCEDURE — 81001 URINALYSIS AUTO W/SCOPE: CPT | Performed by: PHYSICIAN ASSISTANT

## 2018-06-28 PROCEDURE — 87086 URINE CULTURE/COLONY COUNT: CPT | Performed by: PHYSICIAN ASSISTANT

## 2018-06-28 RX ORDER — SODIUM CHLORIDE 0.9 % (FLUSH) 0.9 %
10 SYRINGE (ML) INJECTION AS NEEDED
Status: DISCONTINUED | OUTPATIENT
Start: 2018-06-28 | End: 2018-06-28 | Stop reason: HOSPADM

## 2018-06-29 LAB — BACTERIA SPEC AEROBE CULT: NO GROWTH

## 2018-07-02 ENCOUNTER — TELEPHONE (OUTPATIENT)
Dept: SOCIAL WORK | Facility: HOSPITAL | Age: 63
End: 2018-07-02

## 2018-07-18 ENCOUNTER — APPOINTMENT (OUTPATIENT)
Dept: WOMENS IMAGING | Facility: HOSPITAL | Age: 63
End: 2018-07-18

## 2018-07-18 PROCEDURE — 77063 BREAST TOMOSYNTHESIS BI: CPT | Performed by: RADIOLOGY

## 2018-07-18 PROCEDURE — MDREVIEWSP: Performed by: RADIOLOGY

## 2018-07-18 PROCEDURE — 77067 SCR MAMMO BI INCL CAD: CPT | Performed by: RADIOLOGY

## 2018-08-12 ENCOUNTER — RESULTS ENCOUNTER (OUTPATIENT)
Dept: FAMILY MEDICINE CLINIC | Facility: CLINIC | Age: 63
End: 2018-08-12

## 2018-08-12 DIAGNOSIS — N28.9 RENAL INSUFFICIENCY: ICD-10-CM

## 2018-08-12 DIAGNOSIS — R11.0 NAUSEA: ICD-10-CM

## 2018-08-12 DIAGNOSIS — K21.9 GASTROESOPHAGEAL REFLUX DISEASE WITHOUT ESOPHAGITIS: ICD-10-CM

## 2018-09-10 LAB
ALBUMIN SERPL-MCNC: 4.6 G/DL (ref 3.5–5.2)
ALBUMIN/GLOB SERPL: 2 G/DL
ALP SERPL-CCNC: 64 U/L (ref 39–117)
ALT SERPL-CCNC: 16 U/L (ref 1–33)
AST SERPL-CCNC: 20 U/L (ref 1–32)
BASOPHILS # BLD AUTO: 0.02 10*3/MM3 (ref 0–0.2)
BASOPHILS NFR BLD AUTO: 0.4 % (ref 0–1.5)
BILIRUB SERPL-MCNC: 0.6 MG/DL (ref 0.1–1.2)
BUN SERPL-MCNC: 16 MG/DL (ref 8–23)
BUN/CREAT SERPL: 18 (ref 7–25)
CALCIUM SERPL-MCNC: 9.7 MG/DL (ref 8.6–10.5)
CHLORIDE SERPL-SCNC: 102 MMOL/L (ref 98–107)
CHOLEST SERPL-MCNC: 197 MG/DL (ref 0–200)
CO2 SERPL-SCNC: 27.6 MMOL/L (ref 22–29)
CREAT SERPL-MCNC: 0.89 MG/DL (ref 0.57–1)
EOSINOPHIL # BLD AUTO: 0.25 10*3/MM3 (ref 0–0.7)
EOSINOPHIL NFR BLD AUTO: 5.6 % (ref 0.3–6.2)
ERYTHROCYTE [DISTWIDTH] IN BLOOD BY AUTOMATED COUNT: 13.4 % (ref 11.7–13)
GLOBULIN SER CALC-MCNC: 2.3 GM/DL
GLUCOSE SERPL-MCNC: 87 MG/DL (ref 65–99)
HCT VFR BLD AUTO: 37.7 % (ref 35.6–45.5)
HDLC SERPL-MCNC: 60 MG/DL (ref 40–60)
HGB BLD-MCNC: 12.3 G/DL (ref 11.9–15.5)
IMM GRANULOCYTES # BLD: 0.01 10*3/MM3 (ref 0–0.03)
IMM GRANULOCYTES NFR BLD: 0.2 % (ref 0–0.5)
LDLC SERPL CALC-MCNC: 118 MG/DL (ref 0–100)
LYMPHOCYTES # BLD AUTO: 1.48 10*3/MM3 (ref 0.9–4.8)
LYMPHOCYTES NFR BLD AUTO: 32.9 % (ref 19.6–45.3)
MCH RBC QN AUTO: 31.3 PG (ref 26.9–32)
MCHC RBC AUTO-ENTMCNC: 32.6 G/DL (ref 32.4–36.3)
MCV RBC AUTO: 95.9 FL (ref 80.5–98.2)
MONOCYTES # BLD AUTO: 0.36 10*3/MM3 (ref 0.2–1.2)
MONOCYTES NFR BLD AUTO: 8 % (ref 5–12)
NEUTROPHILS # BLD AUTO: 2.39 10*3/MM3 (ref 1.9–8.1)
NEUTROPHILS NFR BLD AUTO: 53.1 % (ref 42.7–76)
PLATELET # BLD AUTO: 316 10*3/MM3 (ref 140–500)
POTASSIUM SERPL-SCNC: 4.9 MMOL/L (ref 3.5–5.2)
PROT SERPL-MCNC: 6.9 G/DL (ref 6–8.5)
RBC # BLD AUTO: 3.93 10*6/MM3 (ref 3.9–5.2)
SODIUM SERPL-SCNC: 140 MMOL/L (ref 136–145)
TRIGL SERPL-MCNC: 93 MG/DL (ref 0–150)
TSH SERPL DL<=0.005 MIU/L-ACNC: 1.59 MIU/ML (ref 0.27–4.2)
VLDLC SERPL CALC-MCNC: 18.6 MG/DL (ref 5–40)
WBC # BLD AUTO: 4.5 10*3/MM3 (ref 4.5–10.7)

## 2018-09-14 ENCOUNTER — OFFICE VISIT (OUTPATIENT)
Dept: FAMILY MEDICINE CLINIC | Facility: CLINIC | Age: 63
End: 2018-09-14

## 2018-09-14 VITALS
OXYGEN SATURATION: 94 % | TEMPERATURE: 97.9 F | WEIGHT: 139 LBS | DIASTOLIC BLOOD PRESSURE: 76 MMHG | HEART RATE: 76 BPM | RESPIRATION RATE: 16 BRPM | BODY MASS INDEX: 23.16 KG/M2 | SYSTOLIC BLOOD PRESSURE: 131 MMHG | HEIGHT: 65 IN

## 2018-09-14 DIAGNOSIS — N28.9 RENAL INSUFFICIENCY: Primary | ICD-10-CM

## 2018-09-14 DIAGNOSIS — E78.5 HYPERLIPIDEMIA, UNSPECIFIED HYPERLIPIDEMIA TYPE: ICD-10-CM

## 2018-09-14 DIAGNOSIS — R11.0 NAUSEA: ICD-10-CM

## 2018-09-14 DIAGNOSIS — K21.9 GASTROESOPHAGEAL REFLUX DISEASE WITHOUT ESOPHAGITIS: ICD-10-CM

## 2018-09-14 PROCEDURE — 99214 OFFICE O/P EST MOD 30 MIN: CPT | Performed by: FAMILY MEDICINE

## 2018-09-14 RX ORDER — OMEPRAZOLE 20 MG/1
20 CAPSULE, DELAYED RELEASE ORAL DAILY
Qty: 90 CAPSULE | Refills: 1 | Status: SHIPPED | OUTPATIENT
Start: 2018-09-14 | End: 2019-03-22 | Stop reason: SDUPTHER

## 2018-09-14 RX ORDER — SODIUM BICARBONATE 650 MG/1
650 TABLET ORAL 2 TIMES DAILY
Qty: 180 TABLET | Refills: 1 | Status: SHIPPED | OUTPATIENT
Start: 2018-09-14 | End: 2019-03-22 | Stop reason: SDUPTHER

## 2018-09-14 RX ORDER — PROMETHAZINE HYDROCHLORIDE 25 MG/1
TABLET ORAL
Qty: 30 TABLET | Refills: 1 | Status: SHIPPED | OUTPATIENT
Start: 2018-09-14 | End: 2018-11-22 | Stop reason: SDUPTHER

## 2018-09-14 NOTE — PROGRESS NOTES
Subjective   Chief Complaint:   Chief Complaint   Patient presents with   • Heartburn   • Renal Insufficiency         History of Present Illness 's here to review renal insufficiency.  Labs done earlier and I reviewed all the labs with her.  Her BMP is normal.  Her renal test are normal.  Her electrolytes are good.  Did go to the ER and I reviewed that visit and it appears that they put her on an antibiotic for UTI.  Same medication as in the past.  See Dr. Shawn Foley this next 6 months and reevaluate if she needs these medicines.    magnesium/potassium is 1 daily.  Also on sodium bicarbonate 650 mg tablets 1 by mouth twice a day.  I reviewed her labs with her.            Thuy Blackwood 63 y.o. female who presents today for Medical Management of the below listed issues and medication refills.    ICD-10-CM ICD-9-CM   1. Renal insufficiency N28.9 593.9   2. Gastroesophageal reflux disease without esophagitis K21.9 530.81   3. Nausea R11.0 787.02   4. Hyperlipidemia, unspecified hyperlipidemia type E78.5 272.4        she has a problem list of   Patient Active Problem List   Diagnosis   • Hyperlipidemia   • Osteopenia   • History of left breast cancer   • Renal insufficiency   .  Since the last visit, she has overall felt well.  she has been compliant with   Current Outpatient Prescriptions:   •  cholecalciferol (VITAMIN D3) 1000 UNITS tablet, Take 1,000 Units by mouth Every Other Day., Disp: , Rfl:   •  CINNAMON PO, Take  by mouth., Disp: , Rfl:   •  COCONUT OIL PO, Take  by mouth Daily., Disp: , Rfl:   •  MAGNESIUM-POTASSIUM PO, Take  by mouth 2 (Two) Times a Day As Needed., Disp: , Rfl:   •  Multiple Vitamin (MULTI-VITAMIN DAILY PO), Take by mouth daily., Disp: , Rfl:   •  omeprazole (priLOSEC) 20 MG capsule, Take 1 capsule by mouth Daily., Disp: 90 capsule, Rfl: 1  •  promethazine (PHENERGAN) 25 MG tablet, Take one tablet by mouth as needed for nausea and vomiting, Disp: 30 tablet, Rfl: 1  •  sodium  "bicarbonate 650 MG tablet, Take 1 tablet by mouth 2 (Two) Times a Day., Disp: 180 tablet, Rfl: 1.  she denies medication side effects.    All of the chronic condition(s) listed above are stable w/o issues.    /76   Pulse 76   Temp 97.9 °F (36.6 °C)   Resp 16   Ht 165.1 cm (65\")   Wt 63 kg (139 lb)   SpO2 94%   BMI 23.13 kg/m²     Results for orders placed or performed in visit on 08/12/18   Comprehensive metabolic panel   Result Value Ref Range    Glucose 87 65 - 99 mg/dL    BUN 16 8 - 23 mg/dL    Creatinine 0.89 0.57 - 1.00 mg/dL    eGFR Non African Am 64 >60 mL/min/1.73    eGFR African Am 77 >60 mL/min/1.73    BUN/Creatinine Ratio 18.0 7.0 - 25.0    Sodium 140 136 - 145 mmol/L    Potassium 4.9 3.5 - 5.2 mmol/L    Chloride 102 98 - 107 mmol/L    Total CO2 27.6 22.0 - 29.0 mmol/L    Calcium 9.7 8.6 - 10.5 mg/dL    Total Protein 6.9 6.0 - 8.5 g/dL    Albumin 4.60 3.50 - 5.20 g/dL    Globulin 2.3 gm/dL    A/G Ratio 2.0 g/dL    Total Bilirubin 0.6 0.1 - 1.2 mg/dL    Alkaline Phosphatase 64 39 - 117 U/L    AST (SGOT) 20 1 - 32 U/L    ALT (SGPT) 16 1 - 33 U/L   Lipid panel   Result Value Ref Range    Total Cholesterol 197 0 - 200 mg/dL    Triglycerides 93 0 - 150 mg/dL    HDL Cholesterol 60 40 - 60 mg/dL    VLDL Cholesterol 18.6 5 - 40 mg/dL    LDL Cholesterol  118 (H) 0 - 100 mg/dL   TSH   Result Value Ref Range    TSH 1.590 0.270 - 4.200 mIU/mL   CBC and Differential   Result Value Ref Range    WBC 4.50 4.50 - 10.70 10*3/mm3    RBC 3.93 3.90 - 5.20 10*6/mm3    Hemoglobin 12.3 11.9 - 15.5 g/dL    Hematocrit 37.7 35.6 - 45.5 %    MCV 95.9 80.5 - 98.2 fL    MCH 31.3 26.9 - 32.0 pg    MCHC 32.6 32.4 - 36.3 g/dL    RDW 13.4 (H) 11.7 - 13.0 %    Platelets 316 140 - 500 10*3/mm3    Neutrophil Rel % 53.1 42.7 - 76.0 %    Lymphocyte Rel % 32.9 19.6 - 45.3 %    Monocyte Rel % 8.0 5.0 - 12.0 %    Eosinophil Rel % 5.6 0.3 - 6.2 %    Basophil Rel % 0.4 0.0 - 1.5 %    Neutrophils Absolute 2.39 1.90 - 8.10 10*3/mm3    " Lymphocytes Absolute 1.48 0.90 - 4.80 10*3/mm3    Monocytes Absolute 0.36 0.20 - 1.20 10*3/mm3    Eosinophils Absolute 0.25 0.00 - 0.70 10*3/mm3    Basophils Absolute 0.02 0.00 - 0.20 10*3/mm3    Immature Granulocyte Rel % 0.2 0.0 - 0.5 %    Immature Grans Absolute 0.01 0.00 - 0.03 10*3/mm3             The following portions of the patient's history were reviewed and updated as appropriate: allergies, current medications, past family history, past medical history, past social history, past surgical history and problem list.    Review of Systems   Constitutional: Negative for fatigue.   HENT: Negative for sinus pain and sinus pressure.    Eyes: Negative for visual disturbance.   Respiratory: Negative for chest tightness and shortness of breath.    Cardiovascular: Negative for chest pain.   Gastrointestinal: Negative for abdominal pain and nausea.   Genitourinary: Negative for difficulty urinating.   Musculoskeletal: Negative for arthralgias and gait problem.   Skin: Negative for rash.   Neurological: Negative for dizziness.   Psychiatric/Behavioral: Negative for confusion.       Objective   Physical Exam   Constitutional: She is oriented to person, place, and time. She appears well-developed and well-nourished.   HENT:   Mouth/Throat: Oropharynx is clear and moist.   Eyes: Pupils are equal, round, and reactive to light.   Cardiovascular: Normal rate and regular rhythm.    No murmur heard.  Pulmonary/Chest: Effort normal and breath sounds normal.   Abdominal: Soft. Bowel sounds are normal.   Musculoskeletal: Normal range of motion. She exhibits edema.   Lymphadenopathy:     She has no cervical adenopathy.   Neurological: She is alert and oriented to person, place, and time.   Skin: No rash noted.   Psychiatric: She has a normal mood and affect. Her behavior is normal.   Nursing note and vitals reviewed.      Assessment/Plan   Thuy was seen today for heartburn and renal insufficiency.    Diagnoses and all orders for  this visit:    Renal insufficiency  -     sodium bicarbonate 650 MG tablet; Take 1 tablet by mouth 2 (Two) Times a Day.    Gastroesophageal reflux disease without esophagitis  -     omeprazole (priLOSEC) 20 MG capsule; Take 1 capsule by mouth Daily.    Nausea  -     promethazine (PHENERGAN) 25 MG tablet; Take one tablet by mouth as needed for nausea and vomiting    Hyperlipidemia, unspecified hyperlipidemia type

## 2018-10-09 ENCOUNTER — OFFICE VISIT (OUTPATIENT)
Dept: FAMILY MEDICINE CLINIC | Facility: CLINIC | Age: 63
End: 2018-10-09

## 2018-10-09 VITALS
BODY MASS INDEX: 23.32 KG/M2 | RESPIRATION RATE: 16 BRPM | OXYGEN SATURATION: 98 % | SYSTOLIC BLOOD PRESSURE: 116 MMHG | DIASTOLIC BLOOD PRESSURE: 63 MMHG | HEART RATE: 87 BPM | HEIGHT: 65 IN | TEMPERATURE: 97.9 F | WEIGHT: 140 LBS

## 2018-10-09 DIAGNOSIS — J01.90 ACUTE SINUSITIS, RECURRENCE NOT SPECIFIED, UNSPECIFIED LOCATION: Primary | ICD-10-CM

## 2018-10-09 PROCEDURE — 99213 OFFICE O/P EST LOW 20 MIN: CPT | Performed by: NURSE PRACTITIONER

## 2018-10-09 RX ORDER — CEFDINIR 300 MG/1
300 CAPSULE ORAL 2 TIMES DAILY
Qty: 20 CAPSULE | Refills: 0 | Status: SHIPPED | OUTPATIENT
Start: 2018-10-09 | End: 2018-10-19

## 2018-10-09 RX ORDER — PREDNISONE 20 MG/1
20 TABLET ORAL DAILY
Qty: 5 TABLET | Refills: 0 | Status: SHIPPED | OUTPATIENT
Start: 2018-10-09 | End: 2019-03-20

## 2018-10-09 NOTE — PROGRESS NOTES
Subjective   Thuy Blackwood is a 63 y.o. female.     History of Present Illness   Thuy Blackwood 63 y.o. female who presents for evaluation of sinus pressure and congestion, sore throat. Symptoms include congestion, sore throat, swollen glands, dry cough and fatigue.  Onset of symptoms was 2 weeks ago, gradually worsening since that time. Patient denies shortness of breath, wheezing, fever.   Evaluation to date: none Treatment to date:  Mucinex and salt water gargles.     The following portions of the patient's history were reviewed and updated as appropriate: allergies, current medications, past family history, past medical history, past social history, past surgical history and problem list.    Review of Systems   Constitutional: Negative for chills and fever.       Objective   Physical Exam   Constitutional: She is oriented to person, place, and time. She appears well-developed and well-nourished.   HENT:   Right Ear: Tympanic membrane, external ear and ear canal normal.   Left Ear: Tympanic membrane, external ear and ear canal normal.   Nose: Right sinus exhibits no maxillary sinus tenderness and no frontal sinus tenderness. Left sinus exhibits no maxillary sinus tenderness and no frontal sinus tenderness.   Mouth/Throat: Uvula is midline. Posterior oropharyngeal erythema present.   Consistent with post nasal drainage    Cardiovascular: Normal rate and regular rhythm.    Pulmonary/Chest: Effort normal and breath sounds normal.   Neurological: She is alert and oriented to person, place, and time.   Skin: Skin is warm.   Psychiatric: She has a normal mood and affect. Judgment normal.   Nursing note and vitals reviewed.      Assessment/Plan   Thuy was seen today for laryngitis and sore throat.    Diagnoses and all orders for this visit:    Acute sinusitis, recurrence not specified, unspecified location  -     cefdinir (OMNICEF) 300 MG capsule; Take 1 capsule by mouth 2 (Two) Times a Day for 10  days.    Other orders  -     predniSONE (DELTASONE) 20 MG tablet; Take 1 tablet by mouth Daily.

## 2018-11-22 DIAGNOSIS — R11.0 NAUSEA: ICD-10-CM

## 2018-11-26 RX ORDER — PROMETHAZINE HYDROCHLORIDE 25 MG/1
TABLET ORAL
Qty: 30 TABLET | Refills: 0 | Status: SHIPPED | OUTPATIENT
Start: 2018-11-26 | End: 2019-03-20

## 2018-12-19 ENCOUNTER — OFFICE VISIT (OUTPATIENT)
Dept: RETAIL CLINIC | Facility: CLINIC | Age: 63
End: 2018-12-19

## 2018-12-19 DIAGNOSIS — Z53.21 PATIENT LEFT WITHOUT BEING SEEN: Primary | ICD-10-CM

## 2018-12-20 ENCOUNTER — OFFICE VISIT (OUTPATIENT)
Dept: RETAIL CLINIC | Facility: CLINIC | Age: 63
End: 2018-12-20

## 2018-12-20 VITALS
RESPIRATION RATE: 18 BRPM | OXYGEN SATURATION: 99 % | DIASTOLIC BLOOD PRESSURE: 80 MMHG | SYSTOLIC BLOOD PRESSURE: 130 MMHG | TEMPERATURE: 98.6 F | HEART RATE: 82 BPM

## 2018-12-20 DIAGNOSIS — J06.9 UPPER RESPIRATORY TRACT INFECTION, UNSPECIFIED TYPE: ICD-10-CM

## 2018-12-20 DIAGNOSIS — J32.9 SINUSITIS, UNSPECIFIED CHRONICITY, UNSPECIFIED LOCATION: Primary | ICD-10-CM

## 2018-12-20 PROCEDURE — 99213 OFFICE O/P EST LOW 20 MIN: CPT | Performed by: NURSE PRACTITIONER

## 2018-12-20 RX ORDER — AMOXICILLIN 500 MG/1
500 CAPSULE ORAL 2 TIMES DAILY
Qty: 20 CAPSULE | Refills: 0 | Status: SHIPPED | OUTPATIENT
Start: 2018-12-20 | End: 2018-12-30

## 2018-12-20 RX ORDER — BENZONATATE 100 MG/1
CAPSULE ORAL
Qty: 30 CAPSULE | Refills: 0 | Status: SHIPPED | OUTPATIENT
Start: 2018-12-20 | End: 2019-03-20

## 2018-12-20 RX ORDER — PREDNISONE 20 MG/1
20 TABLET ORAL 2 TIMES DAILY
Qty: 10 TABLET | Refills: 0 | Status: SHIPPED | OUTPATIENT
Start: 2018-12-20 | End: 2019-03-20

## 2018-12-20 NOTE — PROGRESS NOTES
Subjective:     Thuy Blackwood is a 63 y.o.     URI    This is a new problem. The current episode started 1 to 4 weeks ago. There has been no fever. Associated symptoms include congestion, coughing, headaches and sinus pain (frontal). Pertinent negatives include no ear pain, plugged ear sensation, swollen glands or wheezing. Sore throat: from drainage and cough. She has tried acetaminophen (mucinex, delsym) for the symptoms. The treatment provided no relief.         The following portions of the patient's history were reviewed and updated as appropriate: allergies, current medications, past family history, past medical history, past social history, past surgical history and problem list.      Review of Systems   HENT: Positive for congestion, postnasal drip, sinus pressure (frontal) and sinus pain (frontal). Negative for ear pain. Sore throat: from drainage and cough.    Respiratory: Positive for cough and shortness of breath (mild). Negative for wheezing.    Cardiovascular: Negative.    Neurological: Positive for headaches.         Objective:      Physical Exam   HENT:   Head: Normocephalic and atraumatic.   Right Ear: Tympanic membrane and ear canal normal.   Left Ear: Ear canal normal. Left ear middle ear effusion:  mild serous.   Nose: Right sinus exhibits frontal sinus tenderness. Right sinus exhibits no maxillary sinus tenderness. Left sinus exhibits frontal sinus tenderness. Left sinus exhibits no maxillary sinus tenderness.   Mouth/Throat: No oropharyngeal exudate or posterior oropharyngeal erythema.   Mild purulent nasal congestion, post nasal drainage noted   Cardiovascular: Normal rate, regular rhythm, S1 normal, S2 normal and normal heart sounds.   Pulmonary/Chest: Effort normal and breath sounds normal.   Coughing intermittently t/o visit   Lymphadenopathy:     She has no cervical adenopathy.   Vitals reviewed.          Diagnoses and all orders for this visit:    Sinusitis, unspecified chronicity,  unspecified location    Upper respiratory tract infection, unspecified type    Other orders  -     amoxicillin (AMOXIL) 500 MG capsule; Take 1 capsule by mouth 2 (Two) Times a Day for 10 days.  -     benzonatate (TESSALON PERLES) 100 MG capsule; 1 to 2 capsules 3 times a day  -     predniSONE (DELTASONE) 20 MG tablet; Take 1 tablet by mouth 2 (Two) Times a Day.    Continue mucinex

## 2018-12-20 NOTE — PATIENT INSTRUCTIONS

## 2019-01-21 ENCOUNTER — TELEPHONE (OUTPATIENT)
Dept: FAMILY MEDICINE CLINIC | Facility: CLINIC | Age: 64
End: 2019-01-21

## 2019-01-21 ENCOUNTER — CLINICAL SUPPORT (OUTPATIENT)
Dept: FAMILY MEDICINE CLINIC | Facility: CLINIC | Age: 64
End: 2019-01-21

## 2019-01-21 DIAGNOSIS — Z23 IMMUNIZATION DUE: Primary | ICD-10-CM

## 2019-01-21 DIAGNOSIS — E78.5 HYPERLIPIDEMIA, UNSPECIFIED HYPERLIPIDEMIA TYPE: Primary | ICD-10-CM

## 2019-01-21 DIAGNOSIS — N28.9 RENAL INSUFFICIENCY: ICD-10-CM

## 2019-01-21 PROCEDURE — 90632 HEPA VACCINE ADULT IM: CPT | Performed by: FAMILY MEDICINE

## 2019-01-21 PROCEDURE — 90471 IMMUNIZATION ADMIN: CPT | Performed by: FAMILY MEDICINE

## 2019-01-21 NOTE — TELEPHONE ENCOUNTER
----- Message from Polina Le Rep sent at 1/21/2019  9:37 AM EST -----  Regarding: LAB ORDERS  Patient has follow up in March and would like labs sent to [facility]. Please.

## 2019-02-18 ENCOUNTER — RESULTS ENCOUNTER (OUTPATIENT)
Dept: FAMILY MEDICINE CLINIC | Facility: CLINIC | Age: 64
End: 2019-02-18

## 2019-02-18 DIAGNOSIS — E78.5 HYPERLIPIDEMIA, UNSPECIFIED HYPERLIPIDEMIA TYPE: ICD-10-CM

## 2019-02-18 DIAGNOSIS — N28.9 RENAL INSUFFICIENCY: ICD-10-CM

## 2019-03-05 ENCOUNTER — APPOINTMENT (OUTPATIENT)
Dept: ONCOLOGY | Facility: CLINIC | Age: 64
End: 2019-03-05

## 2019-03-05 ENCOUNTER — APPOINTMENT (OUTPATIENT)
Dept: LAB | Facility: HOSPITAL | Age: 64
End: 2019-03-05

## 2019-03-13 LAB
ALBUMIN SERPL-MCNC: 4.5 G/DL (ref 3.5–5.2)
ALBUMIN/GLOB SERPL: 1.7 G/DL
ALP SERPL-CCNC: 52 U/L (ref 39–117)
ALT SERPL-CCNC: 20 U/L (ref 1–33)
AST SERPL-CCNC: 22 U/L (ref 1–32)
BILIRUB SERPL-MCNC: 0.4 MG/DL (ref 0.1–1.2)
BUN SERPL-MCNC: 17 MG/DL (ref 8–23)
BUN/CREAT SERPL: 20.7 (ref 7–25)
CALCIUM SERPL-MCNC: 10 MG/DL (ref 8.6–10.5)
CHLORIDE SERPL-SCNC: 103 MMOL/L (ref 98–107)
CHOLEST SERPL-MCNC: 211 MG/DL (ref 0–200)
CO2 SERPL-SCNC: 26.3 MMOL/L (ref 22–29)
CREAT SERPL-MCNC: 0.82 MG/DL (ref 0.57–1)
GLOBULIN SER CALC-MCNC: 2.6 GM/DL
GLUCOSE SERPL-MCNC: 83 MG/DL (ref 65–99)
HDLC SERPL-MCNC: 59 MG/DL (ref 40–60)
LDLC SERPL CALC-MCNC: 131 MG/DL (ref 0–100)
POTASSIUM SERPL-SCNC: 4.7 MMOL/L (ref 3.5–5.2)
PROT SERPL-MCNC: 7.1 G/DL (ref 6–8.5)
SODIUM SERPL-SCNC: 141 MMOL/L (ref 136–145)
TRIGL SERPL-MCNC: 106 MG/DL (ref 0–150)
VLDLC SERPL CALC-MCNC: 21.2 MG/DL (ref 5–40)

## 2019-03-20 ENCOUNTER — OFFICE VISIT (OUTPATIENT)
Dept: ONCOLOGY | Facility: CLINIC | Age: 64
End: 2019-03-20

## 2019-03-20 ENCOUNTER — LAB (OUTPATIENT)
Dept: LAB | Facility: HOSPITAL | Age: 64
End: 2019-03-20

## 2019-03-20 VITALS
HEART RATE: 77 BPM | RESPIRATION RATE: 12 BRPM | TEMPERATURE: 98.3 F | DIASTOLIC BLOOD PRESSURE: 76 MMHG | SYSTOLIC BLOOD PRESSURE: 144 MMHG | HEIGHT: 64 IN | BODY MASS INDEX: 23.63 KG/M2 | WEIGHT: 138.4 LBS | OXYGEN SATURATION: 100 %

## 2019-03-20 DIAGNOSIS — C50.911 MALIGNANT NEOPLASM OF RIGHT BREAST IN FEMALE, ESTROGEN RECEPTOR POSITIVE, UNSPECIFIED SITE OF BREAST (HCC): ICD-10-CM

## 2019-03-20 DIAGNOSIS — Z17.0 MALIGNANT NEOPLASM OF RIGHT BREAST IN FEMALE, ESTROGEN RECEPTOR POSITIVE, UNSPECIFIED SITE OF BREAST (HCC): ICD-10-CM

## 2019-03-20 DIAGNOSIS — E78.1 PURE HYPERGLYCERIDEMIA: Primary | ICD-10-CM

## 2019-03-20 DIAGNOSIS — N28.9 RENAL INSUFFICIENCY: Primary | ICD-10-CM

## 2019-03-20 PROBLEM — C50.919 BREAST CANCER (HCC): Status: ACTIVE | Noted: 2019-03-20

## 2019-03-20 LAB
BASOPHILS # BLD AUTO: 0.05 10*3/MM3 (ref 0–0.2)
BASOPHILS NFR BLD AUTO: 0.9 % (ref 0–1.5)
DEPRECATED RDW RBC AUTO: 46.1 FL (ref 37–54)
EOSINOPHIL # BLD AUTO: 0.33 10*3/MM3 (ref 0–0.4)
EOSINOPHIL NFR BLD AUTO: 6.2 % (ref 0.3–6.2)
ERYTHROCYTE [DISTWIDTH] IN BLOOD BY AUTOMATED COUNT: 13.1 % (ref 12.3–15.4)
HCT VFR BLD AUTO: 40.7 % (ref 34–46.6)
HGB BLD-MCNC: 13.9 G/DL (ref 12–15.9)
IMM GRANULOCYTES # BLD AUTO: 0.07 10*3/MM3 (ref 0–0.05)
IMM GRANULOCYTES NFR BLD AUTO: 1.3 % (ref 0–0.5)
LYMPHOCYTES # BLD AUTO: 1.27 10*3/MM3 (ref 0.7–3.1)
LYMPHOCYTES NFR BLD AUTO: 23.7 % (ref 19.6–45.3)
MCH RBC QN AUTO: 32.4 PG (ref 26.6–33)
MCHC RBC AUTO-ENTMCNC: 34.2 G/DL (ref 31.5–35.7)
MCV RBC AUTO: 94.9 FL (ref 79–97)
MONOCYTES # BLD AUTO: 0.57 10*3/MM3 (ref 0.1–0.9)
MONOCYTES NFR BLD AUTO: 10.6 % (ref 5–12)
NEUTROPHILS # BLD AUTO: 3.07 10*3/MM3 (ref 1.4–7)
NEUTROPHILS NFR BLD AUTO: 57.3 % (ref 42.7–76)
NRBC BLD AUTO-RTO: 0.4 /100 WBC (ref 0–0)
PLATELET # BLD AUTO: 231 10*3/MM3 (ref 140–450)
PMV BLD AUTO: 10.6 FL (ref 6–12)
RBC # BLD AUTO: 4.29 10*6/MM3 (ref 3.77–5.28)
WBC NRBC COR # BLD: 5.36 10*3/MM3 (ref 3.4–10.8)

## 2019-03-20 PROCEDURE — 99214 OFFICE O/P EST MOD 30 MIN: CPT | Performed by: INTERNAL MEDICINE

## 2019-03-20 PROCEDURE — 85025 COMPLETE CBC W/AUTO DIFF WBC: CPT | Performed by: INTERNAL MEDICINE

## 2019-03-20 PROCEDURE — 36416 COLLJ CAPILLARY BLOOD SPEC: CPT | Performed by: INTERNAL MEDICINE

## 2019-03-20 RX ORDER — BACLOFEN 10 MG/1
10 TABLET ORAL
Qty: 30 TABLET | Refills: 1 | Status: SHIPPED | OUTPATIENT
Start: 2019-03-20 | End: 2019-05-15 | Stop reason: SDUPTHER

## 2019-03-20 NOTE — PROGRESS NOTES
REASON FOR FOLLOWUP:  History of left breast cancer 27 years ago.  Left chest wall remains normal.  Right breast examination is normal.          HISTORY OF PRESENT ILLNESS:This patient returns today to the office for follow up stating she has had a tremendous good year.  She has not had any further kidney issues and she remains on sodium bicarbonate tablets.  Her appetite is excellent, weight is stable, her physical activity is wonderful, bowel activity and urination is normal.  Her right breast disclosed no obvious new changes.  The patient is up-to-date in all the testing she needs to have with the exception of a colonoscopy and she does not want to pursue preparation for this.          PAST MEDICAL HISTORY:  Menarche age 9.   3, para 2, miscarriage 1.  Pregnancy  age 26.  She took birth control pills on and off for 10 years and underwent bilateral tubal ligation in .  Hysterectomy with ovaries in situ.        Bone density test review on 2009 documented further decrease in bone density of almost 2.5% bone loss throughout her skeleton.  W  ith this in mind we advised her to undergo evaluation for osteoporosis that would include a magnesium phosphorus, vitamin D level and serum protein electrophoresis and sedimentation rate.  The patient acknowledged that she does not take any calcium or vit  amin D.  Her diet is balanced though.          She developed renal insufficiency with creatinine of 3.0 in 2010, and inositoluria.  Doppler study of the left kidney showed minimal decrease in size, with normal perfusion through both kidneys.          Laparoscopic cholecystectomy performed in 2010.  Pathology showed chronic cholecystitis.  Upper and lower GI endoscopies performed by Dr. Lawton in 2010 showed gastritis but no other pathological diagnosis made.          HEMATOLOGIC/ONCOLOGIC HISTORY: (History from previous dates can be found in the separate document.)        .            MEDICATIONS:  T  he current medication list was reviewed with the patient and updated in the EMR this date per the medical assistant.  Medication dosages and frequencies were confirmed to be accurate.           ALLERGIES:   None.          SOCIAL HISTORY:  .  Otherwise unchanged.          REVIEW OF SYSTEMS:          General: no fever, no chills, no fatigue,no weight changes, no lack of appetite.  Eyes: no epiphora, xerophthalmia,conjunctivitis, pain, glaucoma, blurred vision, blindness, secretion, photophobia, proptosis, diplopia.  Ears: no otorrhea, tinnitus, otorrhagia, deafness, pain, vertigo.  Nose: no rhinorrhea, no epistaxis, no alteration in perception of odors, no sinuses pressure.  Mouth: no alteration in gums or teeth,  No ulcers, no difficulty with mastication or deglut ion, no odynophagia.  Neck: no masses or pain, no thyroid alterations, no pain in muscles or arteries, no carotid odynia, no crepitation.  Respiratory: no cough, no sputum production,no dyspnea,no trepopnea, no pleuritic pain,no hemoptysis.  Heart: no syncope, no irregularity, no palpitations, no angina,no orthopnea,no paroxysmal nocturnal dyspnea.  Vascular Venous: no tenderness,no edema,no palpable cords,no postphlebitic syndrome, no skin changes no ulcerations.  Vascular Arterial: no distal ischemia, noclaudication, no gangrene, no neuropathic ischemic pain, no skin ulcers, no paleness no cyanosis.  GI: no dysphagia, no odynophagia, no regurgitation, no heartburn,no indigestion,no nausea,no vomiting,no hematemesis ,no melena,no jaundice,no distention, no obstipation,no enterorrhagia,no proctalgia,no anal  lesions, no changes in bowel habits.  : no frequency, no hesitancy, no hematuria, no discharge,no  pain.  Musculoskeletal: no muscle or tendon pain or inflammation,no  joint pain, no edema, no functional limitation,no fasciculations, no mass.  Neurologic: no headache, no seizures, noalterations on Craneal nerves, no motor  "deficit, no sensory deficit, normal coordination, no alteration in memory,normal orientation, calculation,normal writting, verbal and written language.  Skin: no rashes,no pruritus no localized lesions.  Psychiatric: no anxiety, no depression,no agitation, no delusions, proper insight.        VITAL SIGNS:    Vitals:    19 1038   BP: 144/76   Pulse: 77   Resp: 12   Temp: 98.3 °F (36.8 °C)   TempSrc: Oral   SpO2: 100%   Weight: 62.8 kg (138 lb 6.4 oz)   Height: 162.5 cm (63.98\")          PAIN:  0 out of 10     ECO        PHYSICAL EXAMINATION:    GENERAL:  Well-developed, well-nourished  Patient  in no acute distress.   SKIN:  Warm, dry ,NO rashes,NO purpura ,NO petechiae.  HEENT:  Pupils were equal and reactive to light and accomodation, conjunctivas non injected, no pterigion, normal extraocular movements, normal visual acuity.   Mouth mucosa was moist, no exudates in oropharynx, normal gum line, normal roof of the mouth and pillars, normal papillations of the tongue.  NECK:  Supple with good range of motion; no thyromegaly or masses, no JVD or bruits, no cervical adenopathies.No carotid arteries pain, no carotid abnormal pulsation , NO arterial dance.  LYMPHATICS:  No cervical, NO supraclavicular, NO axillary,NO epitrochlear , NO inguinal adenopathy.  CHEST:  Normal excursion of both felipe thoraces, normal voice fremitus, no subcutaneous emphysema, normal axillas, no rashes or acanthosis nigricans. Lungs clear to percussion and auscultation, normal breath sounds bilaterally, no wheezing,NO crackles NO ronchi, NO stridor, NO rubs.  INSPECTION of  breast documented symmetry of the tissue per se and location and size of the nipple,no retractions or inversion of the nipple, normal skin without lesions, no erythema or nodules,no paud'orange, no prominence of superficial veins or chest wall collateral circulation.PALPATION of the breast documented normal skin turgor, no induration, alteration in local " temperature, or pain, no palpable masses or nodules, normal mobility of the tissues,no fixation of the tissue or parenchyma to the chest wall, no alteration at the tail of the breasts or axillas, no adenopathies. Left mastectomy Surgical site was well healed.No lymphedema in either extremity.  CARDIAC AND VASCULAR:  normal rate and regular rhythm, without murmurs,NO rubs NO S3 NO S4 right or left . Normal femoral, popliteal, pedis, brachial and carotid pulses.  ABDOMEN:  Soft, nontender with no organomegaly or masses, no ascites, no collateral circulation,no distention,no Keegan sign, no abdominal pain, no inguinal hernias,no umbilical hernia, no abdominal bruits. Normal bowel sounds.  GENITAL: Not  Performed.  EXTREMITIES  AND SPINE:  No clubbing, cyanosis or edema, no deformities posterior cervical pain tense trapezius muscle on the left pain .No kyphosis, scoliosis, deformities or pain in spine, ribs or pelvic bone.  NEUROLOGICAL:  Patient was awake, alert, oriented to time, person and place.              LABORATORY DATA: Comprehensive metabolic panel   Order: [id number]   Status:  Final result   Visible to patient:  Yes (MyChart)   Dx:  Renal insufficiency; Hyperlipidemia, ...    Ref Range & Units 8d ago   Glucose 65 - 99 mg/dL 83    BUN 8 - 23 mg/dL 17    Creatinine 0.57 - 1.00 mg/dL 0.82    eGFR Non African Am >60 mL/min/1.73 70    eGFR African Am >60 mL/min/1.73 85    BUN/Creatinine Ratio 7.0 - 25.0 20.7    Sodium 136 - 145 mmol/L 141    Potassium 3.5 - 5.2 mmol/L 4.7    Chloride 98 - 107 mmol/L 103    Total CO2 22.0 - 29.0 mmol/L 26.3    Calcium 8.6 - 10.5 mg/dL 10.0    Total Protein 6.0 - 8.5 g/dL 7.1    Albumin 3.50 - 5.20 g/dL 4.50    Globulin gm/dL 2.6    A/G Ratio g/dL 1.7           Order: [id number] - Part of Panel Order [id number]   Status:  Final result   Visible to patient:  No (Not Released)   Dx:  Pure hyperglyceridemia    Ref Range & Units 10:15   WBC 3.40 - 10.80 10*3/mm3 5.36    RBC 3.77 - 5.28  10*6/mm3 4.29    Hemoglobin 12.0 - 15.9 g/dL 13.9    Hematocrit 34.0 - 46.6 % 40.7    MCV 79.0 - 97.0 fL 94.9    MCH 26.6 - 33.0 pg 32.4    MCHC 31.5 - 35.7 g/dL 34.2    RDW 12.3 - 15.4 % 13.1    RDW-SD 37.0 - 54.0 fl 46.1    MPV 6.0 - 12.0 fL 10.6    Platelets 140 - 450 10*3/mm3 231    Neutrophil % 42.7 - 76.0 % 57.3    Lymphocyte % 19.6 - 45.3 % 23.7    Monocyte % 5.0 - 12.0 % 10.6    Eosinophil % 0.3 - 6.2 % 6.2    Basophil % 0.0 - 1.5 % 0.9    Immature Grans % 0.0 - 0.5 % 1.3 Abnormally high     Neutrophils, Absolute 1.40 - 7.00 10*3/mm3 3.07    Lymphocytes, Absolute 0.70 - 3.10 10*3/mm3 1.27    Monocytes, Absolute 0.10 - 0.90 10*3/mm3 0.57    Eosinophils, Absolute 0.00 - 0.40 10*3/mm3 0.33    Basophils, Absolute 0.00 - 0.20 10*3/mm3 0.05    Immature Grans, Absolute 0.00 - 0.05 10*3/mm3 0.07 Abnormally high     nRBC 0.0 - 0.0 /100 WBC 0.4 Abnormally high     Resulting [facility] CBC LAB         Specimen Collected: 03/20/19 10:15 Last Resulted: 03/20/19 10:28        Lab Flowsheet      Order Details      View Encounter      Lab and Collection Details      Routing      Result History               Clinical Indications             ASSESSMENT: 1. This patient has a history of left breast cancer 27 years ago.  Normal  r breast examination today.  Mammogram and ultrasound  have been normal.  As far as I can tell, there is no evidence of breast cancer recurrence.  Her right breast examination is normal, her left mastectomy site is normal and she has no symptoms or signs that would indicate any breast cancer recurrence.    2.  It is wonderful seeing the patient’s creatinine going back to less than 1.  She remains on sodium bicarbonate tablets and I advised her to remain on this.  She looks terrific from this point of view.   3.  Patient has developed some cervical spine pain with some crepitation upon movement. The patient cannot take anti-inflammatory medication.  Tylenol does not do anything for her. Therefore, I will  send some baclofen 10 mg p.o. at bedtime to see if this makes a difference in the long run.  An alternative would be physical therapy and even consideration of an MRI.      She will see her primary physician this week.    4.  Otherwise, I will review her back in a year.  She will proceed with her mammogram at her usual time.     She wants to avoid a colonoscopy as much as she can.  Cologuard in my experience has been a terrible test and the only thing that it does is produce false-positive results.  In the long run, maybe she will need to find an Endoscopist that will give her a simple preparation that she will be able to carry through.

## 2019-03-22 ENCOUNTER — OFFICE VISIT (OUTPATIENT)
Dept: FAMILY MEDICINE CLINIC | Facility: CLINIC | Age: 64
End: 2019-03-22

## 2019-03-22 VITALS
TEMPERATURE: 98.2 F | SYSTOLIC BLOOD PRESSURE: 127 MMHG | DIASTOLIC BLOOD PRESSURE: 73 MMHG | WEIGHT: 139 LBS | RESPIRATION RATE: 16 BRPM | OXYGEN SATURATION: 99 % | BODY MASS INDEX: 23.73 KG/M2 | HEART RATE: 79 BPM | HEIGHT: 64 IN

## 2019-03-22 DIAGNOSIS — Z12.11 SPECIAL SCREENING FOR MALIGNANT NEOPLASMS, COLON: ICD-10-CM

## 2019-03-22 DIAGNOSIS — K21.9 GASTROESOPHAGEAL REFLUX DISEASE WITHOUT ESOPHAGITIS: ICD-10-CM

## 2019-03-22 DIAGNOSIS — M85.80 OSTEOPENIA, UNSPECIFIED LOCATION: ICD-10-CM

## 2019-03-22 DIAGNOSIS — Z12.12 SCREENING FOR MALIGNANT NEOPLASM OF THE RECTUM: ICD-10-CM

## 2019-03-22 DIAGNOSIS — E78.5 HYPERLIPIDEMIA, UNSPECIFIED HYPERLIPIDEMIA TYPE: ICD-10-CM

## 2019-03-22 DIAGNOSIS — N28.9 RENAL INSUFFICIENCY: Primary | ICD-10-CM

## 2019-03-22 PROCEDURE — 99214 OFFICE O/P EST MOD 30 MIN: CPT | Performed by: FAMILY MEDICINE

## 2019-03-22 RX ORDER — SODIUM BICARBONATE 650 MG/1
650 TABLET ORAL 2 TIMES DAILY
Qty: 180 TABLET | Refills: 1 | Status: SHIPPED | OUTPATIENT
Start: 2019-03-22 | End: 2019-09-15 | Stop reason: SDUPTHER

## 2019-03-22 RX ORDER — OMEPRAZOLE 20 MG/1
20 CAPSULE, DELAYED RELEASE ORAL DAILY
Qty: 90 CAPSULE | Refills: 1 | Status: SHIPPED | OUTPATIENT
Start: 2019-03-22 | End: 2019-09-15 | Stop reason: SDUPTHER

## 2019-03-22 NOTE — PROGRESS NOTES
Subjective   Chief Complaint:   Chief Complaint   Patient presents with   • Heartburn   • Renal Insufficiency         History of Present Illness comes to the the office to refill medicines.  We will get a refill her sodium bicarb 650 mg tablets take 1 tablet 2 times a day and we will refill her omeprazole 20 mg 1 capsule every other day.  Then she asked about her last colonoscopy which is been over 10 years ago and she says that she does not get a good prep and because of her kidney issues she has problems taking the prep.  So she asked about a colo guard and I think that is fine to do that test at this time.  Go ahead and order the cologuard.  She still seen in the CBC doctors.  We asked the renal doctors about continuing the sodium bicarb but she was considered a new patient she had make appointment she did not want to make an appointment to see the renal doctors.  Give her a prescription for our send that through the computer prescription for the cold guard test.  For the time being she is going to continue the sodium bicarb and the omeprazole.  Viewed her labs and they are good.          Thuy Blackwood 63 y.o. female who presents today for Medical Management of the below listed issues and medication refills.    ICD-10-CM ICD-9-CM   1. Gastroesophageal reflux disease without esophagitis K21.9 530.81   2. Renal insufficiency N28.9 593.9        she has a problem list of   Patient Active Problem List   Diagnosis   • Hyperlipidemia   • Osteopenia   • Personal history of malignant neoplasm of breast   .  Since the last visit, she has overall felt well.  she has been compliant with   Current Outpatient Medications:   •  baclofen (LIORESAL) 10 MG tablet, Take 1 tablet by mouth every night at bedtime., Disp: 30 tablet, Rfl: 1  •  cholecalciferol (VITAMIN D3) 1000 UNITS tablet, Take 1,000 Units by mouth Every Other Day., Disp: , Rfl:   •  CINNAMON PO, Take  by mouth., Disp: , Rfl:   •  COCONUT OIL PO, Take  by mouth  "Daily., Disp: , Rfl:   •  MAGNESIUM-POTASSIUM PO, Take  by mouth 2 (Two) Times a Day As Needed., Disp: , Rfl:   •  Multiple Vitamin (MULTI-VITAMIN DAILY PO), Take by mouth daily., Disp: , Rfl:   •  omeprazole (priLOSEC) 20 MG capsule, Take 1 capsule by mouth Daily., Disp: 90 capsule, Rfl: 1  •  sodium bicarbonate 650 MG tablet, Take 1 tablet by mouth 2 (Two) Times a Day., Disp: 180 tablet, Rfl: 1.  she denies medication side effects.    All of the chronic condition(s) listed above are stable w/o issues.    /73   Pulse 79   Temp 98.2 °F (36.8 °C)   Resp 16   Ht 162.5 cm (63.98\")   Wt 63 kg (139 lb)   SpO2 99%   BMI 23.87 kg/m²     Results for orders placed or performed in visit on 03/20/19   CBC Auto Differential   Result Value Ref Range    WBC 5.36 3.40 - 10.80 10*3/mm3    RBC 4.29 3.77 - 5.28 10*6/mm3    Hemoglobin 13.9 12.0 - 15.9 g/dL    Hematocrit 40.7 34.0 - 46.6 %    MCV 94.9 79.0 - 97.0 fL    MCH 32.4 26.6 - 33.0 pg    MCHC 34.2 31.5 - 35.7 g/dL    RDW 13.1 12.3 - 15.4 %    RDW-SD 46.1 37.0 - 54.0 fl    MPV 10.6 6.0 - 12.0 fL    Platelets 231 140 - 450 10*3/mm3    Neutrophil % 57.3 42.7 - 76.0 %    Lymphocyte % 23.7 19.6 - 45.3 %    Monocyte % 10.6 5.0 - 12.0 %    Eosinophil % 6.2 0.3 - 6.2 %    Basophil % 0.9 0.0 - 1.5 %    Immature Grans % 1.3 (H) 0.0 - 0.5 %    Neutrophils, Absolute 3.07 1.40 - 7.00 10*3/mm3    Lymphocytes, Absolute 1.27 0.70 - 3.10 10*3/mm3    Monocytes, Absolute 0.57 0.10 - 0.90 10*3/mm3    Eosinophils, Absolute 0.33 0.00 - 0.40 10*3/mm3    Basophils, Absolute 0.05 0.00 - 0.20 10*3/mm3    Immature Grans, Absolute 0.07 (H) 0.00 - 0.05 10*3/mm3    nRBC 0.4 (H) 0.0 - 0.0 /100 WBC             The following portions of the patient's history were reviewed and updated as appropriate: allergies, current medications, past family history, past medical history, past social history, past surgical history and problem list.    Review of Systems   Constitutional: Negative for activity " change, appetite change and unexpected weight change.   Eyes: Negative for visual disturbance.   Respiratory: Negative for chest tightness and shortness of breath.    Cardiovascular: Negative for chest pain and palpitations.   Skin: Negative for color change.   Neurological: Negative for syncope and speech difficulty.   Psychiatric/Behavioral: Negative for confusion and decreased concentration.       Objective   Physical Exam   Constitutional: She is oriented to person, place, and time. She appears well-developed and well-nourished.   HENT:   Mouth/Throat: Oropharynx is clear and moist.   Eyes: Pupils are equal, round, and reactive to light.   Neck: Normal range of motion.   Cardiovascular: Normal rate and regular rhythm.   Pulmonary/Chest: Effort normal and breath sounds normal.   Abdominal: Soft. Bowel sounds are normal.   Neurological: She is alert and oriented to person, place, and time.   Psychiatric: She has a normal mood and affect. Her behavior is normal.   Nursing note and vitals reviewed.      Assessment/Plan   Thuy was seen today for heartburn and renal insufficiency.    Diagnoses and all orders for this visit:    Gastroesophageal reflux disease without esophagitis  -     omeprazole (priLOSEC) 20 MG capsule; Take 1 capsule by mouth Daily.    Renal insufficiency  -     sodium bicarbonate 650 MG tablet; Take 1 tablet by mouth 2 (Two) Times a Day.

## 2019-03-27 DIAGNOSIS — Z12.12 SCREENING FOR MALIGNANT NEOPLASM OF THE RECTUM: ICD-10-CM

## 2019-03-27 DIAGNOSIS — N28.9 RENAL INSUFFICIENCY: ICD-10-CM

## 2019-03-27 DIAGNOSIS — C50.911 MALIGNANT NEOPLASM OF RIGHT BREAST IN FEMALE, ESTROGEN RECEPTOR POSITIVE, UNSPECIFIED SITE OF BREAST (HCC): ICD-10-CM

## 2019-03-27 DIAGNOSIS — Z12.11 SPECIAL SCREENING FOR MALIGNANT NEOPLASMS, COLON: ICD-10-CM

## 2019-03-27 DIAGNOSIS — Z17.0 MALIGNANT NEOPLASM OF RIGHT BREAST IN FEMALE, ESTROGEN RECEPTOR POSITIVE, UNSPECIFIED SITE OF BREAST (HCC): ICD-10-CM

## 2019-03-27 DIAGNOSIS — K21.9 GASTROESOPHAGEAL REFLUX DISEASE WITHOUT ESOPHAGITIS: ICD-10-CM

## 2019-03-27 DIAGNOSIS — M85.80 OSTEOPENIA, UNSPECIFIED LOCATION: ICD-10-CM

## 2019-03-27 DIAGNOSIS — E78.5 HYPERLIPIDEMIA, UNSPECIFIED HYPERLIPIDEMIA TYPE: ICD-10-CM

## 2019-03-27 DIAGNOSIS — Z85.3 HISTORY OF LEFT BREAST CANCER: ICD-10-CM

## 2019-03-30 ENCOUNTER — OFFICE VISIT (OUTPATIENT)
Dept: RETAIL CLINIC | Facility: CLINIC | Age: 64
End: 2019-03-30

## 2019-03-30 VITALS
HEART RATE: 98 BPM | RESPIRATION RATE: 18 BRPM | SYSTOLIC BLOOD PRESSURE: 138 MMHG | TEMPERATURE: 98.2 F | OXYGEN SATURATION: 98 % | DIASTOLIC BLOOD PRESSURE: 76 MMHG

## 2019-03-30 DIAGNOSIS — J10.1 INFLUENZA A: Primary | ICD-10-CM

## 2019-03-30 LAB
EXPIRATION DATE: ABNORMAL
FLUAV AG NPH QL: POSITIVE
FLUBV AG NPH QL: NEGATIVE
INTERNAL CONTROL: ABNORMAL
Lab: ABNORMAL

## 2019-03-30 PROCEDURE — 99213 OFFICE O/P EST LOW 20 MIN: CPT | Performed by: NURSE PRACTITIONER

## 2019-03-30 PROCEDURE — 87804 INFLUENZA ASSAY W/OPTIC: CPT | Performed by: NURSE PRACTITIONER

## 2019-03-30 RX ORDER — ONDANSETRON 4 MG/1
4 TABLET, ORALLY DISINTEGRATING ORAL EVERY 8 HOURS PRN
Qty: 28 TABLET | Refills: 0 | Status: SHIPPED | OUTPATIENT
Start: 2019-03-30 | End: 2019-09-24

## 2019-03-30 RX ORDER — OSELTAMIVIR PHOSPHATE 75 MG/1
75 CAPSULE ORAL 2 TIMES DAILY
Qty: 10 CAPSULE | Refills: 0 | Status: SHIPPED | OUTPATIENT
Start: 2019-03-30 | End: 2019-04-04

## 2019-03-30 NOTE — PROGRESS NOTES
Subjective   Thuy Blackwood is a 63 y.o. female.     Nausea   This is a new problem. The current episode started yesterday (330am yesterday). The problem has been gradually worsening. Associated symptoms include congestion, coughing, a fever, headaches (unrelieved), nausea, a sore throat and vomiting. Treatments tried: zofran, phenergan. The treatment provided mild relief.   Flu Symptoms   Associated symptoms include congestion, coughing, a fever, headaches (unrelieved), nausea, a sore throat and vomiting.        The following portions of the patient's history were reviewed and updated as appropriate: allergies, current medications, past family history, past medical history, past social history, past surgical history and problem list.    Review of Systems   Constitutional: Positive for fever.   HENT: Positive for congestion, sinus pressure and sore throat.    Respiratory: Positive for cough.    Gastrointestinal: Positive for nausea and vomiting.       Objective   Physical Exam   Constitutional: She is cooperative. No distress.   HENT:   Head: Normocephalic.   Right Ear: Hearing, external ear and ear canal normal. A middle ear effusion is present.   Left Ear: Hearing, external ear and ear canal normal. A middle ear effusion is present.   Nose: Mucosal edema and congestion present. Right sinus exhibits maxillary sinus tenderness. Left sinus exhibits maxillary sinus tenderness.   Mouth/Throat: Posterior oropharyngeal erythema present.   Eyes: Conjunctivae, EOM and lids are normal. Pupils are equal, round, and reactive to light.   Neck: Trachea normal and full passive range of motion without pain.   Cardiovascular: Normal rate, regular rhythm and normal pulses.   Pulmonary/Chest: Effort normal and breath sounds normal.   Lymphadenopathy:     She has cervical adenopathy.        Right cervical: Superficial cervical adenopathy present.        Left cervical: Superficial cervical adenopathy present.   Neurological: She  is alert.   Skin: Skin is warm. Capillary refill takes less than 2 seconds.   Psychiatric: She has a normal mood and affect. Her speech is normal and behavior is normal.   Vitals reviewed.        Assessment/Plan   Thuy was seen today for nausea and flu symptoms.    Diagnoses and all orders for this visit:    Influenza A  -     POC Influenza A / B    Other orders  -     oseltamivir (TAMIFLU) 75 MG capsule; Take 1 capsule by mouth 2 (Two) Times a Day for 5 days.

## 2019-03-30 NOTE — PATIENT INSTRUCTIONS
Influenza, Adult  Influenza, more commonly known as “the flu,” is a viral infection that primarily affects the respiratory tract. The respiratory tract includes organs that help you breathe, such as the lungs, nose, and throat. The flu causes many common cold symptoms, as well as a high fever and body aches.  The flu spreads easily from person to person (is contagious). Getting a flu shot (influenza vaccination) every year is the best way to prevent influenza.  What are the causes?  This condition is caused by the influenza virus. You can catch the virus by:  · Breathing in droplets from an infected person's cough or sneeze.  · Touching something that has been exposed to the virus (has been contaminated) and then touching your mouth, nose, or eyes.    What increases the risk?  The following factors may make you more likely to develop this condition:  · Not cleaning your hands often with soap and water or alcohol-based hand .  · Having close contact with many people during cold and flu season.  · Touching your mouth, eyes, or nose without first washing or sanitizing your hands.  · Not drinking enough fluids or eating a healthy diet.  · Not getting enough sleep or exercise.  · Being under a high amount of stress.  · Not getting a yearly (annual) flu shot.    You may have a higher risk of serious problems from the flu, such as a severe lung infection (pneumonia), if:  · You are over the age of 65.  · You are pregnant.  · You gave a weakened body defense (immune) system. You may have a weakened immune system if you:  ? Have HIV or AIDS.  ? Are undergoing chemotherapy.  ? Are taking medicines that reduce (suppress) the activity of the immune system.  · You have a long-term (chronic) illness, such as heart disease, kidney disease, diabetes, or lung disease.  · You have a liver disorder.  · You are severely overweight (morbidly obese).  · You have anemia. This is a condition that affects your red blood  cells.  · You have asthma.    What are the signs or symptoms?  Symptoms of this condition usually last 4-10 days and may include:  · Fever.  · Chills.  · Headaches, body aches, or muscle aches.  · Sore throat.  · Cough.  · Runny or stuffy (congested) nose.  · Chest discomfort and cough.  · Poor appetite.  · Weakness or tiredness (fatigue).  · Dizziness.  · Nausea or vomiting.    How is this diagnosed?  This condition may be diagnosed based on your medical history and a physical exam. Your health care provider may do a nose or throat swab test to confirm the diagnosis.  How is this treated?  If influenza is found early, you can be treated with antiviral medicine. This medicine can reduce the length and severity of the illness. Antiviral medicine may be given by mouth (orally) or through an IV tube that is inserted into one of your veins.  Taking care of yourself at home can also relieve symptoms. Your health care provider may recommend taking over-the-counter medicines, drinking plenty of fluids, and adding humidity to the air in your home.  In some cases, influenza goes away on its own. Severe influenza or problems caused by influenza may be treated in a hospital.  Follow these instructions at home:  Activity  · Rest as needed.  · Stay home from work or school as told by your health care provider. Unless you are visiting your health care provider, try to avoid leaving home until your fever has been gone for 24 hours without the use of medicine.  General instructions  · Take over-the-counter and prescription medicines only as told by your health care provider.  · Use a cool mist humidifier to add humidity to the air in your home. This can make breathing easier.  · Drink enough fluid to keep your urine clear or pale yellow.  · Cover your mouth and nose when you cough or sneeze.  · Wash your hands with soap and water often, especially after you cough or sneeze. If soap and water are not available, use hand  .  · Keep all follow-up visits as told by your health care provider. This is important.  How is this prevented?  · An annual flu shot is the best way to prevent the flu. You may get the flu shot in late summer, fall, or winter. Ask your health care provider when you should get your flu shot.  · Wash your hands with soap and water often, especially after you cough or sneeze. If soap and water are not available, use hand .  · Avoid contact with people who are sick during cold and flu season.  · Eat a healthy diet, drink plenty of fluids, get enough sleep, and exercise regularly.  Contact a health care provider if:  · You develop new symptoms.  · You have:  ? Chest pain.  ? Diarrhea.  ? A fever.  · Your cough gets worse.  · You produce more mucus.  · You feel nauseous or you vomit.  Get help right away if:  · You develop shortness of breath or difficulty breathing.  · Your skin or nails turn a bluish color.  · You have severe pain or stiffness in your neck.  · You develop a sudden headache or sudden pain in your face or ear.  · You cannot eat or drink without vomiting.  Summary  · Influenza, more commonly known as “the flu,” is a viral infection that primarily affects your respiratory tract.  · Symptoms of the flu typically last 4-10 days.  · You may get antiviral medicine for treatment.  · Getting an annual flu shot is the best way to avoid getting the flu.  This information is not intended to replace advice given to you by your health care provider. Make sure you discuss any questions you have with your health care provider.  Document Released: 12/15/2001 Document Revised: 01/23/2018 Document Reviewed: 01/23/2018  [employer] Interactive Patient Education © 2019 [employer]

## 2019-04-03 ENCOUNTER — OFFICE VISIT (OUTPATIENT)
Dept: RETAIL CLINIC | Facility: CLINIC | Age: 64
End: 2019-04-03

## 2019-04-03 VITALS
DIASTOLIC BLOOD PRESSURE: 72 MMHG | RESPIRATION RATE: 18 BRPM | OXYGEN SATURATION: 98 % | HEART RATE: 84 BPM | SYSTOLIC BLOOD PRESSURE: 116 MMHG | TEMPERATURE: 98.4 F

## 2019-04-03 DIAGNOSIS — J40 BRONCHITIS: ICD-10-CM

## 2019-04-03 DIAGNOSIS — J11.1 INFLUENZA: Primary | ICD-10-CM

## 2019-04-03 PROCEDURE — 99213 OFFICE O/P EST LOW 20 MIN: CPT | Performed by: NURSE PRACTITIONER

## 2019-04-03 RX ORDER — ALBUTEROL SULFATE 90 UG/1
2 AEROSOL, METERED RESPIRATORY (INHALATION) EVERY 4 HOURS PRN
Qty: 1 INHALER | Refills: 0 | Status: SHIPPED | OUTPATIENT
Start: 2019-04-03 | End: 2019-04-13

## 2019-04-03 RX ORDER — AZITHROMYCIN 250 MG/1
TABLET, FILM COATED ORAL
Qty: 6 TABLET | Refills: 0 | Status: SHIPPED | OUTPATIENT
Start: 2019-04-03 | End: 2019-09-24

## 2019-04-03 NOTE — PATIENT INSTRUCTIONS
Influenza, Adult  Influenza, more commonly known as “the flu,” is a viral infection that primarily affects the respiratory tract. The respiratory tract includes organs that help you breathe, such as the lungs, nose, and throat. The flu causes many common cold symptoms, as well as a high fever and body aches.  The flu spreads easily from person to person (is contagious). Getting a flu shot (influenza vaccination) every year is the best way to prevent influenza.  What are the causes?  This condition is caused by the influenza virus. You can catch the virus by:  · Breathing in droplets from an infected person's cough or sneeze.  · Touching something that has been exposed to the virus (has been contaminated) and then touching your mouth, nose, or eyes.    What increases the risk?  The following factors may make you more likely to develop this condition:  · Not cleaning your hands often with soap and water or alcohol-based hand .  · Having close contact with many people during cold and flu season.  · Touching your mouth, eyes, or nose without first washing or sanitizing your hands.  · Not drinking enough fluids or eating a healthy diet.  · Not getting enough sleep or exercise.  · Being under a high amount of stress.  · Not getting a yearly (annual) flu shot.    You may have a higher risk of serious problems from the flu, such as a severe lung infection (pneumonia), if:  · You are over the age of 65.  · You are pregnant.  · You gave a weakened body defense (immune) system. You may have a weakened immune system if you:  ? Have HIV or AIDS.  ? Are undergoing chemotherapy.  ? Are taking medicines that reduce (suppress) the activity of the immune system.  · You have a long-term (chronic) illness, such as heart disease, kidney disease, diabetes, or lung disease.  · You have a liver disorder.  · You are severely overweight (morbidly obese).  · You have anemia. This is a condition that affects your red blood  cells.  · You have asthma.    What are the signs or symptoms?  Symptoms of this condition usually last 4-10 days and may include:  · Fever.  · Chills.  · Headaches, body aches, or muscle aches.  · Sore throat.  · Cough.  · Runny or stuffy (congested) nose.  · Chest discomfort and cough.  · Poor appetite.  · Weakness or tiredness (fatigue).  · Dizziness.  · Nausea or vomiting.    How is this diagnosed?  This condition may be diagnosed based on your medical history and a physical exam. Your health care provider may do a nose or throat swab test to confirm the diagnosis.  How is this treated?  If influenza is found early, you can be treated with antiviral medicine. This medicine can reduce the length and severity of the illness. Antiviral medicine may be given by mouth (orally) or through an IV tube that is inserted into one of your veins.  Taking care of yourself at home can also relieve symptoms. Your health care provider may recommend taking over-the-counter medicines, drinking plenty of fluids, and adding humidity to the air in your home.  In some cases, influenza goes away on its own. Severe influenza or problems caused by influenza may be treated in a hospital.  Follow these instructions at home:  Activity  · Rest as needed.  · Stay home from work or school as told by your health care provider. Unless you are visiting your health care provider, try to avoid leaving home until your fever has been gone for 24 hours without the use of medicine.  General instructions  · Take over-the-counter and prescription medicines only as told by your health care provider.  · Use a cool mist humidifier to add humidity to the air in your home. This can make breathing easier.  · Drink enough fluid to keep your urine clear or pale yellow.  · Cover your mouth and nose when you cough or sneeze.  · Wash your hands with soap and water often, especially after you cough or sneeze. If soap and water are not available, use hand  .  · Keep all follow-up visits as told by your health care provider. This is important.  How is this prevented?  · An annual flu shot is the best way to prevent the flu. You may get the flu shot in late summer, fall, or winter. Ask your health care provider when you should get your flu shot.  · Wash your hands with soap and water often, especially after you cough or sneeze. If soap and water are not available, use hand .  · Avoid contact with people who are sick during cold and flu season.  · Eat a healthy diet, drink plenty of fluids, get enough sleep, and exercise regularly.  Contact a health care provider if:  · You develop new symptoms.  · You have:  ? Chest pain.  ? Diarrhea.  ? A fever.  · Your cough gets worse.  · You produce more mucus.  · You feel nauseous or you vomit.  Get help right away if:  · You develop shortness of breath or difficulty breathing.  · Your skin or nails turn a bluish color.  · You have severe pain or stiffness in your neck.  · You develop a sudden headache or sudden pain in your face or ear.  · You cannot eat or drink without vomiting.  Summary  · Influenza, more commonly known as “the flu,” is a viral infection that primarily affects your respiratory tract.  · Symptoms of the flu typically last 4-10 days.  · You may get antiviral medicine for treatment.  · Getting an annual flu shot is the best way to avoid getting the flu.  This information is not intended to replace advice given to you by your health care provider. Make sure you discuss any questions you have with your health care provider.  Document Released: 12/15/2001 Document Revised: 01/23/2018 Document Reviewed: 01/23/2018  [employer] Interactive Patient Education © 2019 [employer]

## 2019-04-03 NOTE — PROGRESS NOTES
Subjective   Tuhy Blackwood is a 63 y.o. female.     Pt reports she was diagnosed recently with flu. Pt reports she is taking Tamiflu. Reports nausea and coughing up green secretions. Denies any fever or chills. Denies any SOA or CP.       Cough   This is a new problem. The current episode started in the past 7 days (5 days). The problem has been unchanged. The problem occurs constantly. The cough is productive of sputum (coughing up green sputum). Pertinent negatives include no chills, fever, headaches, sore throat, shortness of breath or wheezing. Treatments tried: tamiflu. The treatment provided mild relief. There is no history of asthma or pneumonia.        The following portions of the patient's history were reviewed and updated as appropriate: allergies, current medications, past family history, past medical history, past social history, past surgical history and problem list.    Review of Systems   Constitutional: Negative.  Negative for chills and fever.   HENT: Negative.  Negative for sore throat.    Eyes: Negative.    Respiratory: Positive for cough. Negative for chest tightness, shortness of breath and wheezing.    Cardiovascular: Negative.    Gastrointestinal: Negative.    Endocrine: Negative.    Genitourinary: Negative.    Musculoskeletal: Negative.    Allergic/Immunologic: Negative.    Neurological: Negative.    Hematological: Negative.    Psychiatric/Behavioral: Negative.        Objective   Physical Exam   Constitutional: She is oriented to person, place, and time. Vital signs are normal. She appears well-developed and well-nourished.   HENT:   Head: Normocephalic and atraumatic.   Right Ear: Hearing, tympanic membrane, external ear and ear canal normal.   Left Ear: Hearing, tympanic membrane, external ear and ear canal normal.   Nose: Nose normal. Right sinus exhibits no maxillary sinus tenderness and no frontal sinus tenderness. Left sinus exhibits no maxillary sinus tenderness and no frontal  sinus tenderness.   Mouth/Throat: Uvula is midline, oropharynx is clear and moist and mucous membranes are normal. No tonsillar exudate.   Eyes: Conjunctivae and lids are normal. Pupils are equal, round, and reactive to light.   Neck: Trachea normal and normal range of motion. Neck supple.   Cardiovascular: Normal rate, regular rhythm, S1 normal, S2 normal and normal heart sounds.   Pulmonary/Chest: Effort normal and breath sounds normal. No respiratory distress.   Abdominal: Soft. Normal appearance and bowel sounds are normal. There is no tenderness.   Musculoskeletal: Normal range of motion.   Lymphadenopathy:     She has no cervical adenopathy.   Neurological: She is alert and oriented to person, place, and time. She has normal strength.   Skin: Skin is warm, dry and intact. Turgor is normal. No rash noted.   Psychiatric: She has a normal mood and affect. Her speech is normal and behavior is normal.   Vitals reviewed.        Assessment/Plan   Thuy was seen today for nausea and cough.    Diagnoses and all orders for this visit:    Influenza    Bronchitis    Other orders  -     azithromycin (ZITHROMAX Z-ANASTACIA) 250 MG tablet; Take 2 tablets the first day, then 1 tablet daily for 4 days.  -     albuterol sulfate  (90 Base) MCG/ACT inhaler; Inhale 2 puffs Every 4 (Four) Hours As Needed for Wheezing for up to 10 days.    Declines CXR  Reports will f/u with Dr Allan for persistent issues

## 2019-05-15 RX ORDER — BACLOFEN 10 MG/1
TABLET ORAL
Qty: 30 TABLET | Refills: 1 | Status: SHIPPED | OUTPATIENT
Start: 2019-05-15 | End: 2019-07-17 | Stop reason: SDUPTHER

## 2019-06-24 RX ORDER — BACLOFEN 10 MG/1
TABLET ORAL
Qty: 30 TABLET | Refills: 1 | OUTPATIENT
Start: 2019-06-24

## 2019-07-04 ENCOUNTER — OFFICE VISIT (OUTPATIENT)
Dept: RETAIL CLINIC | Facility: CLINIC | Age: 64
End: 2019-07-04

## 2019-07-04 VITALS
TEMPERATURE: 97.5 F | WEIGHT: 139 LBS | HEIGHT: 65 IN | DIASTOLIC BLOOD PRESSURE: 68 MMHG | HEART RATE: 86 BPM | SYSTOLIC BLOOD PRESSURE: 124 MMHG | BODY MASS INDEX: 23.16 KG/M2 | RESPIRATION RATE: 18 BRPM | OXYGEN SATURATION: 97 %

## 2019-07-04 DIAGNOSIS — H61.21 EXCESSIVE EAR WAX, RIGHT: Primary | ICD-10-CM

## 2019-07-04 PROCEDURE — 69209 REMOVE IMPACTED EAR WAX UNI: CPT | Performed by: NURSE PRACTITIONER

## 2019-07-04 NOTE — PROGRESS NOTES
"Subjective   Thuy Blackwood is a 63 y.o. female.     /68 (BP Location: Right arm, Patient Position: Sitting, Cuff Size: Adult)   Pulse 86   Temp 97.5 °F (36.4 °C) (Oral)   Resp 18   Ht 165.1 cm (65\")   Wt 63 kg (139 lb)   SpO2 97%   BMI 23.13 kg/m²     Ear Fullness    There is pain in the right ear. This is a new problem. The current episode started in the past 7 days. The problem occurs constantly. The problem has been gradually worsening. There has been no fever. The pain is at a severity of 0/10. The patient is experiencing no pain. Associated symptoms include hearing loss. She has tried ear drops for the symptoms. The treatment provided no relief.        The following portions of the patient's history were reviewed and updated as appropriate: current medications, past family history, past medical history, past social history, past surgical history and problem list.    Review of Systems   Constitutional: Negative.    HENT: Positive for hearing loss.    Eyes: Negative.    Respiratory: Negative.    Cardiovascular: Negative.    Genitourinary: Negative.    Allergic/Immunologic: Positive for environmental allergies.   Neurological: Negative.        Objective   Physical Exam   Constitutional: She is oriented to person, place, and time. She appears well-developed and well-nourished.   HENT:   Head: Normocephalic and atraumatic.   Right Ear: External ear normal. Decreased hearing is noted. cerumen impaction is present.  Left Ear: Hearing, tympanic membrane, external ear and ear canal normal.   Nose: Nose normal.   Mouth/Throat: Uvula is midline, oropharynx is clear and moist and mucous membranes are normal.   Cardiovascular: Normal rate, regular rhythm and normal heart sounds.   Pulmonary/Chest: Effort normal and breath sounds normal.   Musculoskeletal: Normal range of motion.   Neurological: She is alert and oriented to person, place, and time.   Skin: Skin is warm and dry. Capillary refill takes less " than 2 seconds.   Psychiatric: She has a normal mood and affect.   Vitals reviewed.        Assessment/Plan   Diagnoses and all orders for this visit:    Excessive ear wax, right  -     Ear Cerumen Removal Instrumentation    lavage successful, pt states she can now hear      Earwax Buildup, Adult  The ears produce a substance called earwax that helps keep bacteria out of the ear and protects the skin in the ear canal. Occasionally, earwax can build up in the ear and cause discomfort or hearing loss.  What increases the risk?  This condition is more likely to develop in people who:  · Are male.  · Are elderly.  · Naturally produce more earwax.  · Clean their ears often with cotton swabs.  · Use earplugs often.  · Use in-ear headphones often.  · Wear hearing aids.  · Have narrow ear canals.  · Have earwax that is overly thick or sticky.  · Have eczema.  · Are dehydrated.  · Have excess hair in the ear canal.    What are the signs or symptoms?  Symptoms of this condition include:  · Reduced or muffled hearing.  · A feeling of fullness in the ear or feeling that the ear is plugged.  · Fluid coming from the ear.  · Ear pain.  · Ear itch.  · Ringing in the ear.  · Coughing.  · An obvious piece of earwax that can be seen inside the ear canal.    How is this diagnosed?  This condition may be diagnosed based on:  · Your symptoms.  · Your medical history.  · An ear exam. During the exam, your health care provider will look into your ear with an instrument called an otoscope.    You may have tests, including a hearing test.  How is this treated?  This condition may be treated by:  · Using ear drops to soften the earwax.  · Having the earwax removed by a health care provider. The health care provider may:  ? Flush the ear with water.  ? Use an instrument that has a loop on the end (curette).  ? Use a suction device.  · Surgery to remove the wax buildup. This may be done in severe cases.    Follow these instructions at  home:  · Take over-the-counter and prescription medicines only as told by your health care provider.  · Do not put any objects, including cotton swabs, into your ear. You can clean the opening of your ear canal with a washcloth or facial tissue.  · Follow instructions from your health care provider about cleaning your ears. Do not over-clean your ears.  · Drink enough fluid to keep your urine clear or pale yellow. This will help to thin the earwax.  · Keep all follow-up visits as told by your health care provider. If earwax builds up in your ears often or if you use hearing aids, consider seeing your health care provider for routine, preventive ear cleanings. Ask your health care provider how often you should schedule your cleanings.  · If you have hearing aids, clean them according to instructions from the  and your health care provider.  Contact a health care provider if:  · You have ear pain.  · You develop a fever.  · You have blood, pus, or other fluid coming from your ear.  · You have hearing loss.  · You have ringing in your ears that does not go away.  · Your symptoms do not improve with treatment.  · You feel like the room is spinning (vertigo).  Summary  · Earwax can build up in the ear and cause discomfort or hearing loss.  · The most common symptoms of this condition include reduced or muffled hearing and a feeling of fullness in the ear or feeling that the ear is plugged.  · This condition may be diagnosed based on your symptoms, your medical history, and an ear exam.  · This condition may be treated by using ear drops to soften the earwax or by having the earwax removed by a health care provider.  · Do not put any objects, including cotton swabs, into your ear. You can clean the opening of your ear canal with a washcloth or facial tissue.  This information is not intended to replace advice given to you by your health care provider. Make sure you discuss any questions you have with your  health care provider.  Document Released: 01/25/2006 Document Revised: 02/28/2018 Document Reviewed: 02/28/2018  [employer] Interactive Patient Education © 2018 [employer]

## 2019-07-04 NOTE — PATIENT INSTRUCTIONS
Earwax Buildup, Adult  The ears produce a substance called earwax that helps keep bacteria out of the ear and protects the skin in the ear canal. Occasionally, earwax can build up in the ear and cause discomfort or hearing loss.  What increases the risk?  This condition is more likely to develop in people who:  · Are male.  · Are elderly.  · Naturally produce more earwax.  · Clean their ears often with cotton swabs.  · Use earplugs often.  · Use in-ear headphones often.  · Wear hearing aids.  · Have narrow ear canals.  · Have earwax that is overly thick or sticky.  · Have eczema.  · Are dehydrated.  · Have excess hair in the ear canal.    What are the signs or symptoms?  Symptoms of this condition include:  · Reduced or muffled hearing.  · A feeling of fullness in the ear or feeling that the ear is plugged.  · Fluid coming from the ear.  · Ear pain.  · Ear itch.  · Ringing in the ear.  · Coughing.  · An obvious piece of earwax that can be seen inside the ear canal.    How is this diagnosed?  This condition may be diagnosed based on:  · Your symptoms.  · Your medical history.  · An ear exam. During the exam, your health care provider will look into your ear with an instrument called an otoscope.    You may have tests, including a hearing test.  How is this treated?  This condition may be treated by:  · Using ear drops to soften the earwax.  · Having the earwax removed by a health care provider. The health care provider may:  ? Flush the ear with water.  ? Use an instrument that has a loop on the end (curette).  ? Use a suction device.  · Surgery to remove the wax buildup. This may be done in severe cases.    Follow these instructions at home:  · Take over-the-counter and prescription medicines only as told by your health care provider.  · Do not put any objects, including cotton swabs, into your ear. You can clean the opening of your ear canal with a washcloth or facial tissue.  · Follow instructions from your health  care provider about cleaning your ears. Do not over-clean your ears.  · Drink enough fluid to keep your urine clear or pale yellow. This will help to thin the earwax.  · Keep all follow-up visits as told by your health care provider. If earwax builds up in your ears often or if you use hearing aids, consider seeing your health care provider for routine, preventive ear cleanings. Ask your health care provider how often you should schedule your cleanings.  · If you have hearing aids, clean them according to instructions from the  and your health care provider.  Contact a health care provider if:  · You have ear pain.  · You develop a fever.  · You have blood, pus, or other fluid coming from your ear.  · You have hearing loss.  · You have ringing in your ears that does not go away.  · Your symptoms do not improve with treatment.  · You feel like the room is spinning (vertigo).  Summary  · Earwax can build up in the ear and cause discomfort or hearing loss.  · The most common symptoms of this condition include reduced or muffled hearing and a feeling of fullness in the ear or feeling that the ear is plugged.  · This condition may be diagnosed based on your symptoms, your medical history, and an ear exam.  · This condition may be treated by using ear drops to soften the earwax or by having the earwax removed by a health care provider.  · Do not put any objects, including cotton swabs, into your ear. You can clean the opening of your ear canal with a washcloth or facial tissue.  This information is not intended to replace advice given to you by your health care provider. Make sure you discuss any questions you have with your health care provider.  Document Released: 01/25/2006 Document Revised: 02/28/2018 Document Reviewed: 02/28/2018  [employer] Patient Education © 2018 [employer]

## 2019-07-17 RX ORDER — BACLOFEN 10 MG/1
TABLET ORAL
Qty: 30 TABLET | Refills: 1 | Status: SHIPPED | OUTPATIENT
Start: 2019-07-17 | End: 2019-09-13 | Stop reason: SDUPTHER

## 2019-07-29 ENCOUNTER — APPOINTMENT (OUTPATIENT)
Dept: WOMENS IMAGING | Facility: HOSPITAL | Age: 64
End: 2019-07-29

## 2019-07-29 PROCEDURE — MDREVIEWSP: Performed by: RADIOLOGY

## 2019-07-29 PROCEDURE — 77063 BREAST TOMOSYNTHESIS BI: CPT | Performed by: RADIOLOGY

## 2019-07-29 PROCEDURE — 77067 SCR MAMMO BI INCL CAD: CPT | Performed by: RADIOLOGY

## 2019-07-30 ENCOUNTER — DOCUMENTATION (OUTPATIENT)
Dept: ONCOLOGY | Facility: HOSPITAL | Age: 64
End: 2019-07-30

## 2019-07-30 DIAGNOSIS — Z17.0 MALIGNANT NEOPLASM OF RIGHT BREAST IN FEMALE, ESTROGEN RECEPTOR POSITIVE, UNSPECIFIED SITE OF BREAST (HCC): Primary | ICD-10-CM

## 2019-07-30 DIAGNOSIS — C50.911 MALIGNANT NEOPLASM OF RIGHT BREAST IN FEMALE, ESTROGEN RECEPTOR POSITIVE, UNSPECIFIED SITE OF BREAST (HCC): Primary | ICD-10-CM

## 2019-08-08 DIAGNOSIS — Z85.3 HISTORY OF LEFT BREAST CANCER: Primary | ICD-10-CM

## 2019-08-08 NOTE — PROGRESS NOTES
Received a call from [facility] that the orders placed on the 30th were for the wrong side.  Need orders that say for Left breast.  New order placed and will wait for Dr. Dinh to sign and re fax to [facility].

## 2019-08-09 ENCOUNTER — TELEPHONE (OUTPATIENT)
Dept: ONCOLOGY | Facility: HOSPITAL | Age: 64
End: 2019-08-09

## 2019-08-27 ENCOUNTER — TELEPHONE (OUTPATIENT)
Dept: ONCOLOGY | Facility: HOSPITAL | Age: 64
End: 2019-08-27

## 2019-08-27 NOTE — TELEPHONE ENCOUNTER
Routed to ALEK Gillis MA for     ----- Message from Kanchan Khanna sent at 8/27/2019  1:55 PM EDT -----  Contact: [phone]  Pt asking about getting a letter stating she is in remission since date listed in chart.Pt can pickup at the office.

## 2019-08-29 ENCOUNTER — TELEPHONE (OUTPATIENT)
Dept: ONCOLOGY | Facility: CLINIC | Age: 64
End: 2019-08-29

## 2019-09-13 RX ORDER — BACLOFEN 10 MG/1
TABLET ORAL
Qty: 30 TABLET | Refills: 1 | Status: SHIPPED | OUTPATIENT
Start: 2019-09-13 | End: 2019-11-09 | Stop reason: SDUPTHER

## 2019-09-15 DIAGNOSIS — K21.9 GASTROESOPHAGEAL REFLUX DISEASE WITHOUT ESOPHAGITIS: ICD-10-CM

## 2019-09-15 DIAGNOSIS — N28.9 RENAL INSUFFICIENCY: ICD-10-CM

## 2019-09-16 RX ORDER — SODIUM BICARBONATE 650 MG/1
TABLET ORAL
Qty: 30 TABLET | Refills: 0 | Status: SHIPPED | OUTPATIENT
Start: 2019-09-16 | End: 2019-09-24 | Stop reason: SDUPTHER

## 2019-09-16 RX ORDER — OMEPRAZOLE 20 MG/1
CAPSULE, DELAYED RELEASE ORAL
Qty: 30 CAPSULE | Refills: 0 | Status: SHIPPED | OUTPATIENT
Start: 2019-09-16 | End: 2019-10-14 | Stop reason: SDUPTHER

## 2019-09-20 LAB
ALBUMIN SERPL-MCNC: 4.3 G/DL (ref 3.5–5.2)
ALBUMIN/GLOB SERPL: 1.8 G/DL
ALP SERPL-CCNC: 53 U/L (ref 39–117)
ALT SERPL-CCNC: 16 U/L (ref 1–33)
AST SERPL-CCNC: 19 U/L (ref 1–32)
BASOPHILS # BLD AUTO: 0.04 10*3/MM3 (ref 0–0.2)
BASOPHILS NFR BLD AUTO: 1.2 % (ref 0–1.5)
BILIRUB SERPL-MCNC: 0.4 MG/DL (ref 0.2–1.2)
BUN SERPL-MCNC: 22 MG/DL (ref 8–23)
BUN/CREAT SERPL: 22.7 (ref 7–25)
CALCIUM SERPL-MCNC: 9.2 MG/DL (ref 8.6–10.5)
CHLORIDE SERPL-SCNC: 102 MMOL/L (ref 98–107)
CHOLEST SERPL-MCNC: 192 MG/DL (ref 0–200)
CO2 SERPL-SCNC: 24.5 MMOL/L (ref 22–29)
CREAT SERPL-MCNC: 0.97 MG/DL (ref 0.57–1)
EOSINOPHIL # BLD AUTO: 0.12 10*3/MM3 (ref 0–0.4)
EOSINOPHIL NFR BLD AUTO: 3.6 % (ref 0.3–6.2)
ERYTHROCYTE [DISTWIDTH] IN BLOOD BY AUTOMATED COUNT: 12.5 % (ref 12.3–15.4)
GLOBULIN SER CALC-MCNC: 2.4 GM/DL
GLUCOSE SERPL-MCNC: 80 MG/DL (ref 65–99)
HCT VFR BLD AUTO: 38.5 % (ref 34–46.6)
HDLC SERPL-MCNC: 60 MG/DL (ref 40–60)
HGB BLD-MCNC: 12.7 G/DL (ref 12–15.9)
IMM GRANULOCYTES # BLD AUTO: 0 10*3/MM3 (ref 0–0.05)
IMM GRANULOCYTES NFR BLD AUTO: 0 % (ref 0–0.5)
LDLC SERPL CALC-MCNC: 116 MG/DL (ref 0–100)
LYMPHOCYTES # BLD AUTO: 0.82 10*3/MM3 (ref 0.7–3.1)
LYMPHOCYTES NFR BLD AUTO: 24.9 % (ref 19.6–45.3)
MCH RBC QN AUTO: 31.8 PG (ref 26.6–33)
MCHC RBC AUTO-ENTMCNC: 33 G/DL (ref 31.5–35.7)
MCV RBC AUTO: 96.3 FL (ref 79–97)
MONOCYTES # BLD AUTO: 0.31 10*3/MM3 (ref 0.1–0.9)
MONOCYTES NFR BLD AUTO: 9.4 % (ref 5–12)
NEUTROPHILS # BLD AUTO: 2 10*3/MM3 (ref 1.7–7)
NEUTROPHILS NFR BLD AUTO: 60.9 % (ref 42.7–76)
NRBC BLD AUTO-RTO: 0 /100 WBC (ref 0–0.2)
PLATELET # BLD AUTO: 306 10*3/MM3 (ref 140–450)
POTASSIUM SERPL-SCNC: 4.1 MMOL/L (ref 3.5–5.2)
PROT SERPL-MCNC: 6.7 G/DL (ref 6–8.5)
RBC # BLD AUTO: 4 10*6/MM3 (ref 3.77–5.28)
SODIUM SERPL-SCNC: 140 MMOL/L (ref 136–145)
TRIGL SERPL-MCNC: 79 MG/DL (ref 0–150)
TSH SERPL DL<=0.005 MIU/L-ACNC: 2.28 UIU/ML (ref 0.27–4.2)
VLDLC SERPL CALC-MCNC: 15.8 MG/DL
WBC # BLD AUTO: 3.29 10*3/MM3 (ref 3.4–10.8)

## 2019-09-24 ENCOUNTER — OFFICE VISIT (OUTPATIENT)
Dept: FAMILY MEDICINE CLINIC | Facility: CLINIC | Age: 64
End: 2019-09-24

## 2019-09-24 VITALS
BODY MASS INDEX: 22.99 KG/M2 | SYSTOLIC BLOOD PRESSURE: 150 MMHG | TEMPERATURE: 98.1 F | OXYGEN SATURATION: 99 % | HEIGHT: 65 IN | WEIGHT: 138 LBS | HEART RATE: 77 BPM | RESPIRATION RATE: 16 BRPM | DIASTOLIC BLOOD PRESSURE: 74 MMHG

## 2019-09-24 DIAGNOSIS — K21.9 GASTROESOPHAGEAL REFLUX DISEASE WITHOUT ESOPHAGITIS: ICD-10-CM

## 2019-09-24 DIAGNOSIS — N28.9 RENAL INSUFFICIENCY: Primary | ICD-10-CM

## 2019-09-24 DIAGNOSIS — Z23 IMMUNIZATION DUE: ICD-10-CM

## 2019-09-24 PROCEDURE — 90653 IIV ADJUVANT VACCINE IM: CPT | Performed by: FAMILY MEDICINE

## 2019-09-24 PROCEDURE — 99214 OFFICE O/P EST MOD 30 MIN: CPT | Performed by: FAMILY MEDICINE

## 2019-09-24 PROCEDURE — G0008 ADMIN INFLUENZA VIRUS VAC: HCPCS | Performed by: FAMILY MEDICINE

## 2019-09-24 RX ORDER — PROMETHAZINE HYDROCHLORIDE 25 MG/1
25 TABLET ORAL EVERY 8 HOURS PRN
Qty: 30 TABLET | Refills: 1 | Status: SHIPPED | OUTPATIENT
Start: 2019-09-24 | End: 2020-06-08

## 2019-09-24 RX ORDER — OMEPRAZOLE 20 MG/1
20 CAPSULE, DELAYED RELEASE ORAL DAILY
Qty: 30 CAPSULE | Refills: 5 | Status: CANCELLED | OUTPATIENT
Start: 2019-09-24

## 2019-09-24 RX ORDER — SODIUM BICARBONATE 650 MG/1
650 TABLET ORAL 2 TIMES DAILY
Qty: 60 TABLET | Refills: 5 | Status: SHIPPED | OUTPATIENT
Start: 2019-09-24 | End: 2020-03-23

## 2019-09-24 NOTE — PROGRESS NOTES
Subjective   Chief Complaint:   Chief Complaint   Patient presents with   • Hyperlipidemia   • Osteopenia         History of Present Illness to go over her labs from her CKD.  I reviewed her labs.  Her BUN was 22 her creatinine was 0.97 and her GFR was 57.  White blood cell was decreased at 3.29 and I was going to order another CBC but she sees Dr. Cartwright in 2 to 3 months and I will just let him get the CBC and she is going to remind him that her to look at her previous white count of 3.29.  To refill her medications.  I gave her a prescription for Phenergan tablets 25 mg 1 every 6 hours as needed nausea I think I gave her 30 with a refill.  Then I refilled her sodium bicarb same directions as before she takes sodium bicarb 650 mg tablets take 1 tablet by mouth twice a day.  She is okay on the Prilosec I did not refill that.  I will recheck her again in 6 months for 6 months.  She asked about her immunization and I do not see record of her pneumonia or Prevnar she is going to get the flu shot here today and look at [employer] where she goes and see if they have any record of her having a pneumonia and the Prevnar and that she also needs the new shingles shot.  Again she is going to let Dr. Cartwright look at the CBC on her next visit in 2 to 3 months.  Just a CMP in March.          Thuy Blackwood 64 y.o. female who presents today for Medical Management of the below listed issues and medication refills.    ICD-10-CM ICD-9-CM   1. Renal insufficiency N28.9 593.9   2. Gastroesophageal reflux disease without esophagitis K21.9 530.81        she has a problem list of   Patient Active Problem List   Diagnosis   • Hyperlipidemia   • Osteopenia   • Personal history of malignant neoplasm of breast   .    she has been compliant with   Current Outpatient Medications:   •  baclofen (LIORESAL) 10 MG tablet, TAKE 1 TABLET BY MOUTH EVERYDAY AT BEDTIME, Disp: 30 tablet, Rfl: 1  •  cholecalciferol (VITAMIN D3) 1000 UNITS tablet, Take  "1,000 Units by mouth Every Other Day., Disp: , Rfl:   •  CINNAMON PO, Take  by mouth., Disp: , Rfl:   •  COCONUT OIL PO, Take  by mouth Daily., Disp: , Rfl:   •  MAGNESIUM-POTASSIUM PO, Take  by mouth 2 (Two) Times a Day As Needed., Disp: , Rfl:   •  Multiple Vitamin (MULTI-VITAMIN DAILY PO), Take by mouth daily., Disp: , Rfl:   •  omeprazole (priLOSEC) 20 MG capsule, TAKE 1 CAPSULE BY MOUTH EVERY DAY, Disp: 30 capsule, Rfl: 0  •  sodium bicarbonate 650 MG tablet, Take 1 tablet by mouth 2 (Two) Times a Day., Disp: 60 tablet, Rfl: 5  •  promethazine (PHENERGAN) 25 MG tablet, Take 1 tablet by mouth Every 8 (Eight) Hours As Needed for Nausea or Vomiting., Disp: 30 tablet, Rfl: 1.  she denies medication side effects.        /74   Pulse 77   Temp 98.1 °F (36.7 °C)   Resp 16   Ht 165.1 cm (65\")   Wt 62.6 kg (138 lb)   SpO2 99%   BMI 22.96 kg/m²     Results for orders placed or performed in visit on 03/30/19   POC Influenza A / B   Result Value Ref Range    Rapid Influenza A Ag Positive (A) Negative    Rapid Influenza B Ag Negative Negative    Internal Control Passed Passed    Lot Number [id number]     Expiration Date 12/31/2020              The following portions of the patient's history were reviewed and updated as appropriate: allergies, current medications, past family history, past medical history, past social history, past surgical history and problem list.    Review of Systems   Constitutional: Negative for activity change, appetite change and unexpected weight change.   Eyes: Negative for visual disturbance.   Respiratory: Negative for chest tightness and shortness of breath.    Cardiovascular: Negative for chest pain and palpitations.   Skin: Negative for color change.   Neurological: Negative for syncope and speech difficulty.   Psychiatric/Behavioral: Negative for confusion and decreased concentration.       Objective   Physical Exam   Constitutional: She is oriented to person, place, and time. She " appears well-developed and well-nourished.   HENT:   Mouth/Throat: Oropharynx is clear and moist.   Eyes: Pupils are equal, round, and reactive to light.   Neck: No thyromegaly present.   Cardiovascular: Normal rate and regular rhythm.   Pulmonary/Chest: Effort normal and breath sounds normal.   Abdominal: Soft. Bowel sounds are normal.   Neurological: She is alert and oriented to person, place, and time.   Psychiatric: She has a normal mood and affect. Her behavior is normal.   Nursing note and vitals reviewed.      Assessment/Plan   Thuy was seen today for hyperlipidemia and osteopenia.    Diagnoses and all orders for this visit:    Renal insufficiency  -     sodium bicarbonate 650 MG tablet; Take 1 tablet by mouth 2 (Two) Times a Day.    Gastroesophageal reflux disease without esophagitis    Other orders  -     Cancel: omeprazole (priLOSEC) 20 MG capsule; Take 1 capsule by mouth Daily.  -     promethazine (PHENERGAN) 25 MG tablet; Take 1 tablet by mouth Every 8 (Eight) Hours As Needed for Nausea or Vomiting.                 -Labs results discussed in detail with the patient, if no recent labs were done, order placed today.  Plan to update vaccines if needed today.  I  reviewed health maintenance with the patient as part of my preventative care plan. I discussed preventative counseling with the patient in detail.

## 2019-09-24 NOTE — PATIENT INSTRUCTIONS
Exercise 30 minutes most days of the week  Sleep 6-8 hours each night if possible  Low fat, low cholesterol diet   we discussed prescribed medications and how to take them   make sure you get results of any labs/studies ordered today  Low glycemic index diet     To get the flu shot today.  Check at the pharmacy at [facility] if she ever had a pneumonia and a Prevnar.  And then she needs to get the new shingles shot later.Labs results discussed in detail with the patient, if no recent labs were done, order placed today.  Plan to update vaccines if needed today.  I  reviewed health maintenance with the patient as part of my preventative care plan. I discussed preventative counseling with the patient in detail.

## 2019-10-14 DIAGNOSIS — K21.9 GASTROESOPHAGEAL REFLUX DISEASE WITHOUT ESOPHAGITIS: ICD-10-CM

## 2019-10-15 RX ORDER — OMEPRAZOLE 20 MG/1
CAPSULE, DELAYED RELEASE ORAL
Qty: 30 CAPSULE | Refills: 0 | Status: SHIPPED | OUTPATIENT
Start: 2019-10-15 | End: 2019-11-09 | Stop reason: SDUPTHER

## 2019-11-09 DIAGNOSIS — K21.9 GASTROESOPHAGEAL REFLUX DISEASE WITHOUT ESOPHAGITIS: ICD-10-CM

## 2019-11-11 RX ORDER — OMEPRAZOLE 20 MG/1
CAPSULE, DELAYED RELEASE ORAL
Qty: 30 CAPSULE | Refills: 0 | Status: SHIPPED | OUTPATIENT
Start: 2019-11-11 | End: 2020-11-11

## 2019-11-11 RX ORDER — BACLOFEN 10 MG/1
TABLET ORAL
Qty: 30 TABLET | Refills: 1 | Status: SHIPPED | OUTPATIENT
Start: 2019-11-11 | End: 2019-12-07 | Stop reason: SDUPTHER

## 2019-11-13 DIAGNOSIS — K21.9 GASTROESOPHAGEAL REFLUX DISEASE WITHOUT ESOPHAGITIS: ICD-10-CM

## 2019-11-14 RX ORDER — OMEPRAZOLE 20 MG/1
CAPSULE, DELAYED RELEASE ORAL
Qty: 30 CAPSULE | Refills: 0 | OUTPATIENT
Start: 2019-11-14

## 2019-12-09 RX ORDER — BACLOFEN 10 MG/1
TABLET ORAL
Qty: 30 TABLET | Refills: 1 | Status: SHIPPED | OUTPATIENT
Start: 2019-12-09 | End: 2020-01-06

## 2020-01-06 RX ORDER — BACLOFEN 10 MG/1
TABLET ORAL
Qty: 30 TABLET | Refills: 1 | Status: SHIPPED | OUTPATIENT
Start: 2020-01-06 | End: 2020-08-05

## 2020-01-09 NOTE — PROGRESS NOTES
"Subjective   Thuy Blackwood is a 62 y.o. female.     History of Present Illness   Chief Complaint:   Chief Complaint   Patient presents with   • Hyperlipidemia   • Heartburn       Thuy Blackwood 62 y.o. female who presents today for Medical Management of the below listed issues and medication refills. I reviewed her lab results.  Kidney tests stable  Reviewed labs  she has a problem list of   Patient Active Problem List   Diagnosis   • Hyperlipidemia   • Osteopenia   • History of left breast cancer   • Renal insufficiency   .  Since the last visit, she has overall felt well.  she has been compliant with   Current Outpatient Prescriptions:   •  cholecalciferol (VITAMIN D3) 1000 UNITS tablet, Take 1,000 Units by mouth Daily., Disp: , Rfl:   •  CINNAMON PO, Take  by mouth., Disp: , Rfl:   •  Multiple Vitamin (MULTI-VITAMIN DAILY PO), Take by mouth daily., Disp: , Rfl:   •  omeprazole (priLOSEC) 20 MG capsule, TAKE 1 CAPSULE BY MOUTH DAILY., Disp: 30 capsule, Rfl: 0  •  promethazine (PHENERGAN) 25 MG tablet, TAKE 1 TABLET BY MOUTH DAILY AS NEEDED FOR NAUSEA OR VOMITING., Disp: , Rfl: 1  •  sodium bicarbonate 650 MG tablet, Take 1 tablet by mouth 2 (Two) Times a Day., Disp: 180 tablet, Rfl: 1.  she denies medication side effects.    All of the chronic condition(s) listed above are stable w/o issues.    /62  Pulse 80  Temp 97.6 °F (36.4 °C) (Oral)   Resp 16  Ht 64.17\" (163 cm)  Wt 137 lb (62.1 kg)  SpO2 98%  BMI 23.39 kg/m2    Results for orders placed or performed in visit on 07/14/17   Comprehensive metabolic panel   Result Value Ref Range    Glucose 81 65 - 99 mg/dL    BUN 17 8 - 23 mg/dL    Creatinine 1.00 0.57 - 1.00 mg/dL    eGFR Non African Am 56 (L) >60 mL/min/1.73    eGFR African Am 68 >60 mL/min/1.73    BUN/Creatinine Ratio 17.0 7.0 - 25.0    Sodium 138 136 - 145 mmol/L    Potassium 4.4 3.5 - 5.2 mmol/L    Chloride 100 98 - 107 mmol/L    Total CO2 23.7 22.0 - 29.0 mmol/L    Calcium 10.3 8.6 " - 10.5 mg/dL    Total Protein 7.4 6.0 - 8.5 g/dL    Albumin 4.80 3.50 - 5.20 g/dL    Globulin 2.6 gm/dL    A/G Ratio 1.8 g/dL    Total Bilirubin 0.5 0.1 - 1.2 mg/dL    Alkaline Phosphatase 58 39 - 117 U/L    AST (SGOT) 22 1 - 32 U/L    ALT (SGPT) 15 1 - 33 U/L   Lipid panel   Result Value Ref Range    Total Cholesterol 211 (H) 0 - 200 mg/dL    Triglycerides 102 0 - 150 mg/dL    HDL Cholesterol 59 40 - 60 mg/dL    VLDL Cholesterol 20.4 5 - 40 mg/dL    LDL Cholesterol  132 (H) 0 - 100 mg/dL   TSH   Result Value Ref Range    TSH 1.890 0.270 - 4.200 mIU/mL   CBC and Differential   Result Value Ref Range    WBC 4.98 4.50 - 10.70 10*3/mm3    RBC 4.02 3.90 - 5.20 10*6/mm3    Hemoglobin 12.7 11.9 - 15.5 g/dL    Hematocrit 39.5 35.6 - 45.5 %    MCV 98.3 (H) 80.5 - 98.2 fL    MCH 31.6 26.9 - 32.0 pg    MCHC 32.2 (L) 32.4 - 36.3 g/dL    RDW 13.4 (H) 11.7 - 13.0 %    Platelets 287 140 - 500 10*3/mm3    Neutrophil Rel % 66.3 42.7 - 76.0 %    Lymphocyte Rel % 19.9 19.6 - 45.3 %    Monocyte Rel % 9.2 5.0 - 12.0 %    Eosinophil Rel % 4.0 0.3 - 6.2 %    Basophil Rel % 0.6 0.0 - 1.5 %    Neutrophils Absolute 3.30 1.90 - 8.10 10*3/mm3    Lymphocytes Absolute 0.99 0.90 - 4.80 10*3/mm3    Monocytes Absolute 0.46 0.20 - 1.20 10*3/mm3    Eosinophils Absolute 0.20 0.00 - 0.70 10*3/mm3    Basophils Absolute 0.03 0.00 - 0.20 10*3/mm3    Immature Granulocyte Rel % 0.0 0.0 - 0.5 %    Immature Grans Absolute 0.00 0.00 - 0.03 10*3/mm3         The following portions of the patient's history were reviewed and updated as appropriate: allergies, current medications, past family history, past medical history, past social history, past surgical history and problem list.    Review of Systems   Constitutional: Negative for activity change, appetite change and unexpected weight change.   HENT: Positive for congestion.    Eyes: Negative for visual disturbance.   Respiratory: Positive for cough. Negative for chest tightness and shortness of breath.     Cardiovascular: Negative for chest pain and palpitations.   Skin: Negative for color change.   Neurological: Negative for syncope and speech difficulty.   Psychiatric/Behavioral: Negative for confusion and decreased concentration.       Objective   Physical Exam   Constitutional: She is oriented to person, place, and time. She appears well-developed and well-nourished.   HENT:   Head: Normocephalic.   Mouth/Throat: Oropharynx is clear and moist.   Eyes: Pupils are equal, round, and reactive to light.   Neck: Normal range of motion. No thyromegaly present.   Cardiovascular: Normal rate and regular rhythm.    Pulmonary/Chest: Effort normal and breath sounds normal.   Abdominal: Soft. Bowel sounds are normal.   Musculoskeletal: Normal range of motion.   Neurological: She is alert and oriented to person, place, and time. She has normal reflexes.   Skin: No rash noted.   Psychiatric: She has a normal mood and affect. Her behavior is normal.   Vitals reviewed.      Assessment/Plan   Thuy was seen today for hyperlipidemia and heartburn.    Diagnoses and all orders for this visit:    Renal insufficiency  -     sodium bicarbonate 650 MG tablet; Take 1 tablet by mouth 2 (Two) Times a Day.  -     Comprehensive Metabolic Panel; Future  -     Lipid Panel; Future    Gastroesophageal reflux disease without esophagitis  -     Comprehensive Metabolic Panel; Future  -     Lipid Panel; Future    Nausea  -     promethazine (PHENERGAN) 25 MG tablet; Take one tablet by mouth as needed for nausea and vomiting  -     Comprehensive Metabolic Panel; Future  -     Lipid Panel; Future    Other orders  -     Cancel: omeprazole (priLOSEC) 20 MG capsule; Take 1 capsule by mouth Daily.                emergency venous access/critical illness

## 2020-01-31 ENCOUNTER — OFFICE VISIT (OUTPATIENT)
Dept: FAMILY MEDICINE CLINIC | Facility: CLINIC | Age: 65
End: 2020-01-31

## 2020-01-31 VITALS
RESPIRATION RATE: 16 BRPM | BODY MASS INDEX: 22.82 KG/M2 | HEART RATE: 75 BPM | WEIGHT: 137 LBS | OXYGEN SATURATION: 98 % | HEIGHT: 65 IN | TEMPERATURE: 97.8 F | DIASTOLIC BLOOD PRESSURE: 76 MMHG | SYSTOLIC BLOOD PRESSURE: 139 MMHG

## 2020-01-31 DIAGNOSIS — Z00.00 INITIAL MEDICARE ANNUAL WELLNESS VISIT: Primary | ICD-10-CM

## 2020-01-31 PROCEDURE — 96160 PT-FOCUSED HLTH RISK ASSMT: CPT | Performed by: FAMILY MEDICINE

## 2020-01-31 PROCEDURE — G0438 PPPS, INITIAL VISIT: HCPCS | Performed by: FAMILY MEDICINE

## 2020-01-31 NOTE — PATIENT INSTRUCTIONS
Medicare Wellness  Personal Prevention Plan of Service     Date of Office Visit:  2020  Encounter Provider:  Larry Allan MD  Place of Service:  [facility]  Patient Name: Thuy Blackwood  :  1955    As part of the Medicare Wellness portion of your visit today, we are providing you with this personalized preventive plan of services (PPPS). This plan is based upon recommendations of the [place] Preventive Services Task Force (USPSTF) and the Advisory Committee on Immunization Practices (ACIP).    This lists the preventive care services that should be considered, and provides dates of when you are due. Items listed as completed are up-to-date and do not require any further intervention.    Health Maintenance   Topic Date Due   • DXA SCAN  2020 (Originally 2018)   • Pneumococcal Vaccine Once at 65 Years Old  2020 (Originally 2018)   • ZOSTER VACCINE (2 of 2) 2021 (Originally 2017)   • LIPID PANEL  2020   • MEDICARE ANNUAL WELLNESS  2021   • MAMMOGRAM  2021   • COLONOSCOPY  2022   • TDAP/TD VACCINES (2 - Td) 2027   • INFLUENZA VACCINE  Completed   • HEPATITIS C SCREENING  Discontinued       No orders of the defined types were placed in this encounter.      No follow-ups on file.

## 2020-01-31 NOTE — PROGRESS NOTES
The ABCs of the Annual Wellness Visit  Initial Medicare Wellness Visit    Chief Complaint   Patient presents with   • Medicare Wellness-Initial Visit       Subjective   History of Present Illness:  Thuy Blackwood is a 64 y.o. female who presents for an Initial Medicare Wellness Visit.    HEALTH RISK ASSESSMENT    Recent Hospitalizations:  No hospitalization(s) within the last year.    Current Medical Providers:  Patient Care Team:  Larry Allan MD as PCP - General (Family Medicine)  Provider, No Known as PCP - Family Medicine  Dereck Dinh MD as Consulting Physician (Hematology and Oncology)  Larry Allan MD as Referring Physician (Family Medicine)    Smoking Status:  Social History     Tobacco Use   Smoking Status Never Smoker   Smokeless Tobacco Never Used       Alcohol Consumption:  Social History     Substance and Sexual Activity   Alcohol Use Yes    Comment: occasional       Depression Screen:   PHQ-2/PHQ-9 Depression Screening 1/31/2020   Little interest or pleasure in doing things 0   Feeling down, depressed, or hopeless 0   Trouble falling or staying asleep, or sleeping too much 3   Feeling tired or having little energy 1   Poor appetite or overeating 0   Feeling bad about yourself - or that you are a failure or have let yourself or your family down 0   Trouble concentrating on things, such as reading the newspaper or watching television 0   Moving or speaking so slowly that other people could have noticed. Or the opposite - being so fidgety or restless that you have been moving around a lot more than usual 0   Thoughts that you would be better off dead, or of hurting yourself in some way 0   Total Score 4   If you checked off any problems, how difficult have these problems made it for you to do your work, take care of things at home, or get along with other people? Not difficult at all       Fall Risk Screen:  STEADI Fall Risk Assessment was completed, and patient is at LOW risk for  falls.Assessment completed on:1/31/2020    Health Habits and Functional and Cognitive Screening:  Functional & Cognitive Status 1/31/2020   Do you have difficulty preparing food and eating? No   Do you have difficulty bathing yourself, getting dressed or grooming yourself? No   Do you have difficulty using the toilet? No   Do you have difficulty moving around from place to place? No   Do you have trouble with steps or getting out of a bed or a chair? No   Current Diet Well Balanced Diet   Dental Exam Up to date   Eye Exam Not up to date   Exercise (times per week) 2 times per week   Current Exercise Activities Include Walking   Do you need help using the phone?  No   Are you deaf or do you have serious difficulty hearing?  No   Do you need help with transportation? No   Do you need help shopping? No   Do you need help preparing meals?  No   Do you need help with housework?  No   Do you need help with laundry? No   Do you need help taking your medications? No   Do you need help managing money? No   Do you ever drive or ride in a car without wearing a seat belt? No   Have you felt unusual stress, anger or loneliness in the last month? No   Who do you live with? Spouse   If you need help, do you have trouble finding someone available to you? No   Have you been bothered in the last four weeks by sexual problems? No   Do you have difficulty concentrating, remembering or making decisions? No         Does the patient have evidence of cognitive impairment? Yes    Asprin use counseling:Does not need ASA (and currently is not on it)    Age-appropriate Screening Schedule:  Refer to the list below for future screening recommendations based on patient's age, sex and/or medical conditions. Orders for these recommended tests are listed in the plan section. The patient has been provided with a written plan.    Health Maintenance   Topic Date Due   • DXA SCAN  05/01/2020 (Originally 7/6/2018)   • ZOSTER VACCINE (2 of 2) 04/01/2021  (Originally 4/11/2017)   • LIPID PANEL  09/19/2020   • MAMMOGRAM  07/29/2021   • COLONOSCOPY  01/01/2022   • TDAP/TD VACCINES (2 - Td) 05/09/2027   • INFLUENZA VACCINE  Completed          The following portions of the patient's history were reviewed and updated as appropriate: problem list.    Outpatient Medications Prior to Visit   Medication Sig Dispense Refill   • baclofen (LIORESAL) 10 MG tablet TAKE 1 TABLET BY MOUTH EVERYDAY AT BEDTIME 30 tablet 1   • cholecalciferol (VITAMIN D3) 1000 UNITS tablet Take 1,000 Units by mouth Every Other Day.     • CINNAMON PO Take  by mouth.     • COCONUT OIL PO Take  by mouth Daily.     • MAGNESIUM-POTASSIUM PO Take  by mouth 2 (Two) Times a Day As Needed.     • Multiple Vitamin (MULTI-VITAMIN DAILY PO) Take by mouth daily.     • omeprazole (priLOSEC) 20 MG capsule TAKE 1 CAPSULE BY MOUTH EVERY DAY 30 capsule 0   • promethazine (PHENERGAN) 25 MG tablet Take 1 tablet by mouth Every 8 (Eight) Hours As Needed for Nausea or Vomiting. 30 tablet 1   • sodium bicarbonate 650 MG tablet Take 1 tablet by mouth 2 (Two) Times a Day. 60 tablet 5     No facility-administered medications prior to visit.        Patient Active Problem List   Diagnosis   • Hyperlipidemia   • Osteopenia   • Personal history of malignant neoplasm of breast       Advanced Care Planning:  Patient has an advance directive - a copy has not been provided. Have asked the patient to send this to us to add to record    Review of Systems    Compared to one year ago, the patient feels her physical health is the same.  Compared to one year ago, the patient feels her mental health is the same.    Reviewed chart for potential of high risk medication in the elderly: not applicable  Reviewed chart for potential of harmful drug interactions in the elderly:not applicable    Objective         Vitals:    01/31/20 1011   BP: 139/76   Pulse: 75   Resp: 16   Temp: 97.8 °F (36.6 °C)   SpO2: 98%   Weight: 62.1 kg (137 lb)   Height: 165.1  "cm (65\")       Body mass index is 22.8 kg/m².  Discussed the patient's BMI with her. The BMI is in the acceptable range.    Physical Exam          Assessment/Plan   Medicare Risks and Personalized Health Plan  CMS Preventative Services Quick Reference  Abdominal Aortic Aneurysm Screening  Advance Directive Discussion  Breast Cancer/Mammogram Screening  Cardiovascular risk  Chronic Pain   Colon Cancer Screening  Diabetic Lab Screening   Fall Risk    The above risks/problems have been discussed with the patient.  Pertinent information has been shared with the patient in the After Visit Summary.  Follow up plans and orders are seen below in the Assessment/Plan Section.    There are no diagnoses linked to this encounter.  Follow Up:  No follow-ups on file.     An After Visit Summary and PPPS were given to the patient.           "

## 2020-02-04 RX ORDER — BACLOFEN 10 MG/1
TABLET ORAL
Qty: 30 TABLET | Refills: 1 | OUTPATIENT
Start: 2020-02-04

## 2020-02-24 ENCOUNTER — RESULTS ENCOUNTER (OUTPATIENT)
Dept: FAMILY MEDICINE CLINIC | Facility: CLINIC | Age: 65
End: 2020-02-24

## 2020-02-24 DIAGNOSIS — N28.9 RENAL INSUFFICIENCY: ICD-10-CM

## 2020-02-24 DIAGNOSIS — K21.9 GASTROESOPHAGEAL REFLUX DISEASE WITHOUT ESOPHAGITIS: ICD-10-CM

## 2020-03-12 ENCOUNTER — LAB (OUTPATIENT)
Dept: LAB | Facility: HOSPITAL | Age: 65
End: 2020-03-12

## 2020-03-12 ENCOUNTER — OFFICE VISIT (OUTPATIENT)
Dept: ONCOLOGY | Facility: CLINIC | Age: 65
End: 2020-03-12

## 2020-03-12 VITALS
HEART RATE: 73 BPM | BODY MASS INDEX: 23.52 KG/M2 | OXYGEN SATURATION: 99 % | RESPIRATION RATE: 16 BRPM | HEIGHT: 64 IN | SYSTOLIC BLOOD PRESSURE: 157 MMHG | DIASTOLIC BLOOD PRESSURE: 78 MMHG | WEIGHT: 137.8 LBS | TEMPERATURE: 97.5 F

## 2020-03-12 DIAGNOSIS — D75.89 MACROCYTOSIS WITHOUT ANEMIA: ICD-10-CM

## 2020-03-12 DIAGNOSIS — E78.1 PURE HYPERGLYCERIDEMIA: Primary | ICD-10-CM

## 2020-03-12 DIAGNOSIS — Z85.3 PERSONAL HISTORY OF MALIGNANT NEOPLASM OF BREAST: Primary | ICD-10-CM

## 2020-03-12 LAB
ALBUMIN SERPL-MCNC: 4.3 G/DL (ref 3.5–5.2)
ALBUMIN/GLOB SERPL: 1.5 G/DL (ref 1.1–2.4)
ALP SERPL-CCNC: 56 U/L (ref 38–116)
ALT SERPL W P-5'-P-CCNC: 19 U/L (ref 0–33)
ANION GAP SERPL CALCULATED.3IONS-SCNC: 11.5 MMOL/L (ref 5–15)
AST SERPL-CCNC: 23 U/L (ref 0–32)
BASOPHILS # BLD AUTO: 0.04 10*3/MM3 (ref 0–0.2)
BASOPHILS NFR BLD AUTO: 0.8 % (ref 0–1.5)
BILIRUB SERPL-MCNC: 0.5 MG/DL (ref 0.2–1.2)
BUN BLD-MCNC: 19 MG/DL (ref 6–20)
BUN/CREAT SERPL: 20.9 (ref 7.3–30)
CALCIUM SPEC-SCNC: 9.7 MG/DL (ref 8.5–10.2)
CHLORIDE SERPL-SCNC: 101 MMOL/L (ref 98–107)
CO2 SERPL-SCNC: 27.5 MMOL/L (ref 22–29)
CREAT BLD-MCNC: 0.91 MG/DL (ref 0.6–1.1)
DEPRECATED RDW RBC AUTO: 48.8 FL (ref 37–54)
EOSINOPHIL # BLD AUTO: 0.17 10*3/MM3 (ref 0–0.4)
EOSINOPHIL NFR BLD AUTO: 3.5 % (ref 0.3–6.2)
ERYTHROCYTE [DISTWIDTH] IN BLOOD BY AUTOMATED COUNT: 13.2 % (ref 12.3–15.4)
GFR SERPL CREATININE-BSD FRML MDRD: 62 ML/MIN/1.73
GLOBULIN UR ELPH-MCNC: 2.8 GM/DL (ref 1.8–3.5)
GLUCOSE BLD-MCNC: 101 MG/DL (ref 74–124)
HCT VFR BLD AUTO: 39.8 % (ref 34–46.6)
HGB BLD-MCNC: 12.6 G/DL (ref 12–15.9)
IMM GRANULOCYTES # BLD AUTO: 0.01 10*3/MM3 (ref 0–0.05)
IMM GRANULOCYTES NFR BLD AUTO: 0.2 % (ref 0–0.5)
LYMPHOCYTES # BLD AUTO: 0.98 10*3/MM3 (ref 0.7–3.1)
LYMPHOCYTES NFR BLD AUTO: 20.1 % (ref 19.6–45.3)
MCH RBC QN AUTO: 31.6 PG (ref 26.6–33)
MCHC RBC AUTO-ENTMCNC: 31.7 G/DL (ref 31.5–35.7)
MCV RBC AUTO: 99.7 FL (ref 79–97)
MONOCYTES # BLD AUTO: 0.58 10*3/MM3 (ref 0.1–0.9)
MONOCYTES NFR BLD AUTO: 11.9 % (ref 5–12)
NEUTROPHILS # BLD AUTO: 3.1 10*3/MM3 (ref 1.7–7)
NEUTROPHILS NFR BLD AUTO: 63.5 % (ref 42.7–76)
NRBC BLD AUTO-RTO: 0 /100 WBC (ref 0–0.2)
PLATELET # BLD AUTO: 283 10*3/MM3 (ref 140–450)
PMV BLD AUTO: 9.4 FL (ref 6–12)
POTASSIUM BLD-SCNC: 4.7 MMOL/L (ref 3.5–4.7)
PROT SERPL-MCNC: 7.1 G/DL (ref 6.3–8)
RBC # BLD AUTO: 3.99 10*6/MM3 (ref 3.77–5.28)
SODIUM BLD-SCNC: 140 MMOL/L (ref 134–145)
WBC NRBC COR # BLD: 4.88 10*3/MM3 (ref 3.4–10.8)

## 2020-03-12 PROCEDURE — 99213 OFFICE O/P EST LOW 20 MIN: CPT | Performed by: INTERNAL MEDICINE

## 2020-03-12 PROCEDURE — 36415 COLL VENOUS BLD VENIPUNCTURE: CPT

## 2020-03-12 PROCEDURE — 85025 COMPLETE CBC W/AUTO DIFF WBC: CPT

## 2020-03-12 PROCEDURE — 80053 COMPREHEN METABOLIC PANEL: CPT

## 2020-03-12 NOTE — PROGRESS NOTES
REASON FOR FOLLOWUP:  History of left breast cancer 28 years ago.  Left chest wall remains normal.  Right breast examination is normal.          HISTORY OF PRESENT ILLNESS:This patient returns today to the office for followup. In the meantime in the last year she has not had any health issues. She continues taking her proton pump inhibitor every other day for prevention purposes. She has not a lot of heartburn or indigestion, no nausea or vomiting. Her diet is appropriate in vegetables and fruit and she does not eat a lot of meat neither eggs. She has 2 glasses on wine on Saturday, 2 glasses of wine on Sunday. She is not taking any other new medication. She has not had any bone pain, no joint pain, normal bowel activity, normal urination, no cardiovascular or respiratory issues. Her mammogram last summer was normal          Past Medical History:   Diagnosis Date   • Acute sinusitis    • Anemia    • H/O bone density study 08/2016    [facility]    • H/O Breast cancer     Left, 23 years ago.   • H/O complete eye exam 2014   • Hyperlipidemia    • Osteopenia    • Personal history of malignant neoplasm of breast    • Renal insufficiency    • UTI (urinary tract infection)      Social History     Socioeconomic History   • Marital status:      Spouse name: Not on file   • Number of children: Not on file   • Years of education: Not on file   • Highest education level: Not on file   Occupational History   • Occupation: Unknown   Tobacco Use   • Smoking status: Never Smoker   • Smokeless tobacco: Never Used   Substance and Sexual Activity   • Alcohol use: Yes     Comment: occasional   • Drug use: No     Family History   Problem Relation Age of Onset   • Depression Other    • Heart disease Other    • Hypertension Other    • Heart attack Other    • Rheum arthritis Other                  REVIEW OF SYSTEMS:            General: no fever, no chills, no fatigue,no weight changes, no lack of  appetite.  Eyes: no epiphora, xerophthalmia,conjunctivitis, pain, glaucoma, blurred vision, blindness, secretion, photophobia, proptosis, diplopia.  Ears: no otorrhea, tinnitus, otorrhagia, deafness, pain, vertigo.  Nose: no rhinorrhea, no epistaxis, no alteration in perception of odors, no sinuses pressure.  Mouth: no alteration in gums or teeth,  No ulcers, no difficulty with mastication or deglut ion, no odynophagia.  Neck: no masses or pain, no thyroid alterations, no pain in muscles or arteries, no carotid odynia, no crepitation.  Respiratory: no cough, no sputum production,no dyspnea,no trepopnea, no pleuritic pain,no hemoptysis.  Heart: no syncope, no irregularity, no palpitations, no angina,no orthopnea,no paroxysmal nocturnal dyspnea.  Vascular Venous: no tenderness,no edema,no palpable cords,no postphlebitic syndrome, no skin changes no ulcerations.  Vascular Arterial: no distal ischemia, noclaudication, no gangrene, no neuropathic ischemic pain, no skin ulcers, no paleness no cyanosis.  GI: no dysphagia, no odynophagia, no regurgitation, no heartburn,no indigestion,no nausea,no vomiting,no hematemesis ,no melena,no jaundice,no distention, no obstipation,no enterorrhagia,no proctalgia,no anal  lesions, no changes in bowel habits.  : no frequency, no hesitancy, no hematuria, no discharge,no  pain.  Musculoskeletal: no muscle or tendon pain or inflammation,no  joint pain, no edema, no functional limitation,no fasciculations, no mass.  Neurologic: no headache, no seizures, noalterations on Craneal nerves, no motor deficit, no sensory deficit, normal coordination, no alteration in memory,normal orientation, calculation,normal writting, verbal and written language.  Skin: no rashes,no pruritus no localized lesions.  Psychiatric: no anxiety, no depression,no agitation, no delusions, proper insight.        VITAL SIGNS:    Vitals:    03/12/20 1035   BP: 157/78   Pulse: 73   Resp: 16   Temp: 97.5 °F (36.4 °C)  "  SpO2: 99%   Weight: 62.5 kg (137 lb 12.8 oz)   Height: 162 cm (63.78\")          PAIN:  0 out of 10     ECO        PHYSICAL EXAMINATION:        GENERAL:  Well-developed, well-nourished  Patient  in no acute distress.   SKIN:  Warm, dry ,NO rashes,NO purpura ,NO petechiae.  HEENT:  Pupils were equal and reactive to light and accomodation, conjunctivas non injected, no pterigion, normal extraocular movements, normal visual acuity.   Mouth mucosa was moist, no exudates in oropharynx, normal gum line, normal roof of the mouth and pillars, normal papillations of the tongue.  NECK:  Supple with good range of motion; no thyromegaly or masses, no JVD or bruits, no cervical adenopathies.No carotid arteries pain, no carotid abnormal pulsation , NO arterial dance.  LYMPHATICS:  No cervical, NO supraclavicular, NO axillary,NO epitrochlear , NO inguinal adenopathy.  CHEST:  Normal excursion of both felipe thoraces, normal voice fremitus, no subcutaneous emphysema, normal axillas, no rashes or acanthosis nigricans. Lungs clear to percussion and auscultation, normal breath sounds bilaterally, no wheezing,NO crackles NO ronchi, NO stridor, NO rubs.  CARDIAC AND VASCULAR:  normal rate and regular rhythm, without murmurs,NO rubs NO S3 NO S4 right or left . Normal femoral, popliteal, pedis, brachial and carotid pulses.  INSPECTION of  breast documented symmetry of the tissue per se and location and size of the nipple,no retractions or inversion of the nipple, normal skin without lesions, no erythema or nodules,no paud'orange, no prominence of superficial veins or chest wall collateral circulation.PALPATION of the breast documented normal skin turgor, no induration, alteration in local temperature, or pain, no palpable masses or nodules, normal mobility of the tissues,no fixation of the tissue or parenchyma to the chest wall, no alteration at the tail of the breasts or axillas, no adenopathies. L MASTECTOMY Surgical site was well " healed.No lymphedema in either extremity.  ABDOMEN:  Soft, nontender with no organomegaly or masses, no ascites, no collateral circulation,no distention,no Keegan sign, no abdominal pain, no inguinal hernias,no umbilical hernia, no abdominal bruits. Normal bowel sounds.  GENITAL: Not  Performed.  EXTREMITIES  AND SPINE:  No clubbing, cyanosis or edema, no deformities or pain .No kyphosis, scoliosis, deformities or pain in spine, ribs or pelvic bone.  NEUROLOGICAL:  Patient was awake, alert, oriented to time, person and place.              LABORATORY DATA:SCANNED - MAMMO [[id number]] (Order [id number])   Order   Status: Final result   Appointment Information     Scans on Order [id number]     Scan on 7/29/2019 by Dereck Dinh MD: RIGHT BREAST SCREENING MAMMOScan on 7/29/2019 by Dereck Dinh MD: RIGHT BREAST SCREENING MAMMO     Auto Differential   Order: [id number] - Part of Panel Order [id number]   Status:  Final result   Visible to patient:  No (Not Released)   Dx:  Pure hyperglyceridemia   Specimen Information: Blood        Component  Ref Range & Units 10:21   WBC  3.40 - 10.80 10*3/mm3 4.88    RBC  3.77 - 5.28 10*6/mm3 3.99    Hemoglobin  12.0 - 15.9 g/dL 12.6    Hematocrit  34.0 - 46.6 % 39.8    MCV  79.0 - 97.0 fL 99.7High     MCH  26.6 - 33.0 pg 31.6    MCHC  31.5 - 35.7 g/dL 31.7    RDW  12.3 - 15.4 % 13.2    RDW-SD  37.0 - 54.0 fl 48.8    MPV  6.0 - 12.0 fL 9.4    Platelets  140 - 450 10*3/mm3 283    Neutrophil %  42.7 - 76.0 % 63.5    Lymphocyte %  19.6 - 45.3 % 20.1    Monocyte %  5.0 - 12.0 % 11.9    Eosinophil %  0.3 - 6.2 % 3.5    Basophil %  0.0 - 1.5 % 0.8    Immature Grans %  0.0 - 0.5 % 0.2                      ASSESSMENT: 1. This patient has a history of left breast cancer 27 years ago.  Normal  r breast examination today.  Mammogram   has been normal.  As far as I can tell, there is no evidence of breast cancer recurrence.  Her right breast examination is normal, her left mastectomy site is normal  and she has no symptoms or signs that would indicate any breast cancer recurrence.      2.The patient continues taking proton pump inhibitor every other day. Given her history of previous abnormal creatinine even though previous testing has documented a creatinine below 1 I am concerned that these medicines typically are discontinued by nephrologists in the background of abnormal creatinine elevation. She is not taking any antiinflammatory medication and I proposed to her the fact that these medicines also can decrease magnesium absorption, can produce B12 deficiency and also can favor osteoporosis to consider strongly to stop this medication and replace this for Pepcid 20 mg. She will proceed with this.     She has macrocytosis without any anemia, she has a couple of glasses of wine on Saturday, a couple of glasses of wine on Sunday, she does not get drunk, her diet seems to be that is appropriate but I suggested for her to try to decrease the wine consumption and also to be sure that she has enough B12 in her diet having 2 eggs a month and maybe a little bit more fish in her diet and to have a B12 supplementation sublingual twice a week.     We went ahead and measured her B12 and folic acid levels today. If any abnormalities are found this will be discussed with her.     Otherwise I will review her back in a year.

## 2020-03-17 LAB
ALBUMIN SERPL-MCNC: 4.2 G/DL (ref 3.5–5.2)
ALBUMIN/GLOB SERPL: 1.6 G/DL
ALP SERPL-CCNC: 59 U/L (ref 39–117)
ALT SERPL-CCNC: 19 U/L (ref 1–33)
AST SERPL-CCNC: 19 U/L (ref 1–32)
BILIRUB SERPL-MCNC: 0.4 MG/DL (ref 0.2–1.2)
BUN SERPL-MCNC: 17 MG/DL (ref 8–23)
BUN/CREAT SERPL: 19.8 (ref 7–25)
CALCIUM SERPL-MCNC: 9.3 MG/DL (ref 8.6–10.5)
CHLORIDE SERPL-SCNC: 101 MMOL/L (ref 98–107)
CO2 SERPL-SCNC: 28.7 MMOL/L (ref 22–29)
CREAT SERPL-MCNC: 0.86 MG/DL (ref 0.57–1)
GLOBULIN SER CALC-MCNC: 2.6 GM/DL
GLUCOSE SERPL-MCNC: 84 MG/DL (ref 65–99)
POTASSIUM SERPL-SCNC: 4.6 MMOL/L (ref 3.5–5.2)
PROT SERPL-MCNC: 6.8 G/DL (ref 6–8.5)
SODIUM SERPL-SCNC: 140 MMOL/L (ref 136–145)

## 2020-03-23 DIAGNOSIS — N28.9 RENAL INSUFFICIENCY: ICD-10-CM

## 2020-03-23 RX ORDER — SODIUM BICARBONATE 650 MG/1
TABLET ORAL
Qty: 180 TABLET | Refills: 1 | Status: SHIPPED | OUTPATIENT
Start: 2020-03-23 | End: 2020-09-22

## 2020-06-08 RX ORDER — PROMETHAZINE HYDROCHLORIDE 25 MG/1
25 TABLET ORAL EVERY 8 HOURS PRN
Qty: 30 TABLET | Refills: 1 | Status: SHIPPED | OUTPATIENT
Start: 2020-06-08 | End: 2020-11-11 | Stop reason: SDUPTHER

## 2020-06-29 ENCOUNTER — TELEPHONE (OUTPATIENT)
Dept: FAMILY MEDICINE CLINIC | Facility: CLINIC | Age: 65
End: 2020-06-29

## 2020-06-29 DIAGNOSIS — Z12.39 SCREENING FOR BREAST CANCER: Primary | ICD-10-CM

## 2020-06-29 DIAGNOSIS — M81.8 OTHER OSTEOPOROSIS WITHOUT CURRENT PATHOLOGICAL FRACTURE: ICD-10-CM

## 2020-06-29 DIAGNOSIS — Z85.3 PERSONAL HISTORY OF MALIGNANT NEOPLASM OF BREAST: ICD-10-CM

## 2020-06-29 DIAGNOSIS — M85.80 OSTEOPENIA, UNSPECIFIED LOCATION: ICD-10-CM

## 2020-06-29 NOTE — TELEPHONE ENCOUNTER
Non diabetic pt here for left foot pain that persists since he was last seen in 10/2017. He comes to the clinic with his orthotics but doesn't have the met cookies.  He states they worked well but fell off after four months.  He states he is having pain again in the left foot toes 2-3 that are numb and cause a burning sensation that almost seems like an itch and this has been since he was last seen in 2017.  Previous Impression:  Neuritis of foot, left  Acquired bilateral ankle pronation  Pain of left foot  Patient denies any other issues at this time.    Denies known Latex allergy or symptoms of Latex sensitivity.  Medications verified, no changes.  Allergies verified, no changes.  Tobacco use verified, no changes.  PCP verified, no changes.    Patient last saw PCP on 2/6/19     Pt called requesting orders for mammo and DEXA to [facility].

## 2020-07-30 ENCOUNTER — APPOINTMENT (OUTPATIENT)
Dept: WOMENS IMAGING | Facility: HOSPITAL | Age: 65
End: 2020-07-30

## 2020-07-30 PROCEDURE — 77063 BREAST TOMOSYNTHESIS BI: CPT | Performed by: RADIOLOGY

## 2020-07-30 PROCEDURE — 77080 DXA BONE DENSITY AXIAL: CPT | Performed by: RADIOLOGY

## 2020-07-30 PROCEDURE — 77067 SCR MAMMO BI INCL CAD: CPT | Performed by: RADIOLOGY

## 2020-08-05 RX ORDER — BACLOFEN 10 MG/1
TABLET ORAL
Qty: 30 TABLET | Refills: 1 | Status: SHIPPED | OUTPATIENT
Start: 2020-08-05 | End: 2021-01-04

## 2020-08-12 ENCOUNTER — TELEPHONE (OUTPATIENT)
Dept: ONCOLOGY | Facility: CLINIC | Age: 65
End: 2020-08-12

## 2020-08-12 DIAGNOSIS — M85.80 OSTEOPENIA, UNSPECIFIED LOCATION: ICD-10-CM

## 2020-08-12 NOTE — TELEPHONE ENCOUNTER
----- Message from Dereck Dinh MD sent at 8/12/2020  9:44 AM EDT -----  CALL HER HER BONE QUALITY CONTINUES TO DETERIORATE SHE HAS TO GET PROLIA Q 6 M, MEASURE VIT D AND MAG AND PHOS AND CMP

## 2020-08-12 NOTE — TELEPHONE ENCOUNTER
----- Message from Devi Trejo RN sent at 8/12/2020 10:52 AM EDT -----  I spoke with the pt, please schedule prolia in the next week or so.    Thank you!    ----- Message -----  From: Dereck Dinh MD  Sent: 8/12/2020   9:44 AM EDT  To: Devi Trejo RN    CALL HER HER BONE QUALITY CONTINUES TO DETERIORATE SHE HAS TO GET PROLIA Q 6 M, MEASURE VIT D AND MAG AND PHOS AND CMP

## 2020-08-12 NOTE — TELEPHONE ENCOUNTER
Called pt per Dr Dinh request. Explained prolia, use of vitamin D and calcium, avoid invasive dental procedures, or notify us should she need any and will monitor labs during treatment.  Explained I would enter treatment plan for authorization and scheduling will contact her for appt. All questions answered.

## 2020-08-20 ENCOUNTER — APPOINTMENT (OUTPATIENT)
Dept: ONCOLOGY | Facility: HOSPITAL | Age: 65
End: 2020-08-20

## 2020-08-20 ENCOUNTER — APPOINTMENT (OUTPATIENT)
Dept: LAB | Facility: HOSPITAL | Age: 65
End: 2020-08-20

## 2020-08-28 RX ORDER — BACLOFEN 10 MG/1
TABLET ORAL
Qty: 30 TABLET | Refills: 1 | OUTPATIENT
Start: 2020-08-28

## 2020-09-22 DIAGNOSIS — N28.9 RENAL INSUFFICIENCY: ICD-10-CM

## 2020-09-22 RX ORDER — SODIUM BICARBONATE 650 MG/1
TABLET ORAL
Qty: 60 TABLET | Refills: 0 | Status: SHIPPED | OUTPATIENT
Start: 2020-09-22 | End: 2020-11-11 | Stop reason: SDUPTHER

## 2020-10-05 ENCOUNTER — TELEPHONE (OUTPATIENT)
Dept: ONCOLOGY | Facility: CLINIC | Age: 65
End: 2020-10-05

## 2020-10-05 DIAGNOSIS — S22.080A COMPRESSION FRACTURE OF T11 VERTEBRA, INITIAL ENCOUNTER (HCC): ICD-10-CM

## 2020-10-05 DIAGNOSIS — M85.9 DISORDER OF BONE DENSITY AND STRUCTURE, UNSPECIFIED: Primary | ICD-10-CM

## 2020-10-05 NOTE — TELEPHONE ENCOUNTER
DEXA Bone Density Axial [[id number]] (Order [id number])  Order  Status: Final result     Dr. Allan already had discussed issues pertinent to the patient’s bone density with this patient. I went back and discussed with the patient on the telephone today the fact that she has minimal compression in T11 and she has osteopenia according to the report of her DEXA scan. The minimal compression in T11 makes to my eyes the diagnosis of osteoporosis more than anything else. The patient was not approved for Prolia by the insurance company. Therefore under the present circumstances I do believe that the patient will require to remain on her vitamin D supplement every other day, continue walking at least 7000 steps a day and taking a little bit of sun in the morning; at 10 a.m. in the morning for 10 minutes once a week will give her 30,000 units extra of vitamin D possible.     I did not ask her to use bisphosphonates. These medicines have no dramatic benefit in my record in regard to osteoporosis management given all the rituals and the high degree of discontinuation of the use of the medicines by women because “they do not notice the clinical benefit of this on different terms.”    GIVEN REPORTED COLLAPSE OF T 11 I WILL REQUEST INVESTIGATION ON RECLAST

## 2020-10-09 ENCOUNTER — TELEPHONE (OUTPATIENT)
Dept: FAMILY MEDICINE CLINIC | Facility: CLINIC | Age: 65
End: 2020-10-09

## 2020-10-09 DIAGNOSIS — N28.9 RENAL INSUFFICIENCY: ICD-10-CM

## 2020-10-09 DIAGNOSIS — M85.80 OSTEOPENIA, UNSPECIFIED LOCATION: ICD-10-CM

## 2020-10-09 DIAGNOSIS — E78.5 HYPERLIPIDEMIA, UNSPECIFIED HYPERLIPIDEMIA TYPE: Primary | ICD-10-CM

## 2020-10-14 DIAGNOSIS — N28.9 RENAL INSUFFICIENCY: ICD-10-CM

## 2020-10-14 RX ORDER — SODIUM BICARBONATE 650 MG/1
TABLET ORAL
Qty: 60 TABLET | Refills: 0 | OUTPATIENT
Start: 2020-10-14

## 2020-11-04 LAB
ALBUMIN SERPL-MCNC: 4.6 G/DL (ref 3.5–5.2)
ALBUMIN/GLOB SERPL: 2 G/DL
ALP SERPL-CCNC: 54 U/L (ref 39–117)
ALT SERPL-CCNC: 17 U/L (ref 1–33)
AST SERPL-CCNC: 19 U/L (ref 1–32)
BASOPHILS # BLD AUTO: 0.04 10*3/MM3 (ref 0–0.2)
BASOPHILS NFR BLD AUTO: 0.9 % (ref 0–1.5)
BILIRUB SERPL-MCNC: 0.4 MG/DL (ref 0–1.2)
BUN SERPL-MCNC: 17 MG/DL (ref 8–23)
BUN/CREAT SERPL: 19.3 (ref 7–25)
CALCIUM SERPL-MCNC: 9.7 MG/DL (ref 8.6–10.5)
CHLORIDE SERPL-SCNC: 103 MMOL/L (ref 98–107)
CHOLEST SERPL-MCNC: 214 MG/DL (ref 0–200)
CO2 SERPL-SCNC: 28.6 MMOL/L (ref 22–29)
CREAT SERPL-MCNC: 0.88 MG/DL (ref 0.57–1)
EOSINOPHIL # BLD AUTO: 0.1 10*3/MM3 (ref 0–0.4)
EOSINOPHIL NFR BLD AUTO: 2.3 % (ref 0.3–6.2)
ERYTHROCYTE [DISTWIDTH] IN BLOOD BY AUTOMATED COUNT: 12.3 % (ref 12.3–15.4)
GLOBULIN SER CALC-MCNC: 2.3 GM/DL
GLUCOSE SERPL-MCNC: 87 MG/DL (ref 65–99)
HCT VFR BLD AUTO: 39.8 % (ref 34–46.6)
HDLC SERPL-MCNC: 60 MG/DL (ref 40–60)
HGB BLD-MCNC: 12.9 G/DL (ref 12–15.9)
IMM GRANULOCYTES # BLD AUTO: 0.01 10*3/MM3 (ref 0–0.05)
IMM GRANULOCYTES NFR BLD AUTO: 0.2 % (ref 0–0.5)
LDLC SERPL CALC-MCNC: 137 MG/DL (ref 0–100)
LYMPHOCYTES # BLD AUTO: 1.02 10*3/MM3 (ref 0.7–3.1)
LYMPHOCYTES NFR BLD AUTO: 23.8 % (ref 19.6–45.3)
MCH RBC QN AUTO: 31.1 PG (ref 26.6–33)
MCHC RBC AUTO-ENTMCNC: 32.4 G/DL (ref 31.5–35.7)
MCV RBC AUTO: 95.9 FL (ref 79–97)
MONOCYTES # BLD AUTO: 0.5 10*3/MM3 (ref 0.1–0.9)
MONOCYTES NFR BLD AUTO: 11.7 % (ref 5–12)
NEUTROPHILS # BLD AUTO: 2.61 10*3/MM3 (ref 1.7–7)
NEUTROPHILS NFR BLD AUTO: 61.1 % (ref 42.7–76)
NRBC BLD AUTO-RTO: 0 /100 WBC (ref 0–0.2)
PLATELET # BLD AUTO: 324 10*3/MM3 (ref 140–450)
POTASSIUM SERPL-SCNC: 4.4 MMOL/L (ref 3.5–5.2)
PROT SERPL-MCNC: 6.9 G/DL (ref 6–8.5)
RBC # BLD AUTO: 4.15 10*6/MM3 (ref 3.77–5.28)
SODIUM SERPL-SCNC: 139 MMOL/L (ref 136–145)
TRIGL SERPL-MCNC: 94 MG/DL (ref 0–150)
TSH SERPL DL<=0.005 MIU/L-ACNC: 2.39 UIU/ML (ref 0.27–4.2)
VLDLC SERPL CALC-MCNC: 17 MG/DL (ref 5–40)
WBC # BLD AUTO: 4.28 10*3/MM3 (ref 3.4–10.8)

## 2020-11-11 ENCOUNTER — TELEPHONE (OUTPATIENT)
Dept: ONCOLOGY | Facility: CLINIC | Age: 65
End: 2020-11-11

## 2020-11-11 ENCOUNTER — OFFICE VISIT (OUTPATIENT)
Dept: FAMILY MEDICINE CLINIC | Facility: CLINIC | Age: 65
End: 2020-11-11

## 2020-11-11 VITALS
RESPIRATION RATE: 16 BRPM | SYSTOLIC BLOOD PRESSURE: 126 MMHG | HEART RATE: 81 BPM | WEIGHT: 138 LBS | HEIGHT: 65 IN | BODY MASS INDEX: 22.99 KG/M2 | TEMPERATURE: 97.3 F | OXYGEN SATURATION: 99 % | DIASTOLIC BLOOD PRESSURE: 69 MMHG

## 2020-11-11 DIAGNOSIS — M85.80 OSTEOPENIA, UNSPECIFIED LOCATION: Primary | ICD-10-CM

## 2020-11-11 DIAGNOSIS — N28.9 RENAL INSUFFICIENCY: ICD-10-CM

## 2020-11-11 PROCEDURE — 99214 OFFICE O/P EST MOD 30 MIN: CPT | Performed by: FAMILY MEDICINE

## 2020-11-11 RX ORDER — PROMETHAZINE HYDROCHLORIDE 25 MG/1
25 TABLET ORAL EVERY 8 HOURS PRN
Qty: 30 TABLET | Refills: 1 | Status: SHIPPED | OUTPATIENT
Start: 2020-11-11 | End: 2021-05-12 | Stop reason: SDUPTHER

## 2020-11-11 RX ORDER — SODIUM BICARBONATE 650 MG/1
650 TABLET ORAL 2 TIMES DAILY
Qty: 60 TABLET | Refills: 5 | Status: SHIPPED | OUTPATIENT
Start: 2020-11-11 | End: 2021-05-12 | Stop reason: SDUPTHER

## 2020-11-11 NOTE — TELEPHONE ENCOUNTER
Patient would like to speak to Dr. Dinh' nurse.    Patient said Dr. Dinh had wanted her to get injection for osteopenia.  Insurance denied.      She has recently gotten notice from [employer] that the medication is now improved. (Zoledronic acid 1 mg)    Please call her  [phone]

## 2020-11-11 NOTE — PROGRESS NOTES
Subjective   Chief Complaint:   Chief Complaint   Patient presents with   • Hyperlipidemia   • Osteopenia         History of Present Illness   patient came in to discuss several things today: one is wanted to see the kidney doctor because she had not seen the kidney doctor in some time and just make sure she still needs to take the sodium bicarb. Another is seeing a gastroenterologist she disc sounds like irritable bowel syndrome she has not had a colonoscopy in some time the last time she did that was a Cologuard.  So again I want him to see Dr. Mcgowan about her gastroenterologist and a possible colonoscopy in dealing with irritable bowel syndrome she is going to monitor the irritable bowel syndrome.  Then I want her to see a renal doctor and Dr. Foley's group.  To refill her meds namely the Phenergan and and sodium bicarb.  And it does sound like irritable bowel syndrome and what she describes.  Can continue the probiotic.  And again she did have a colonoscopy last time she did the Cologuard.  There is only in the morning first bowel movement usually and I reviewed all of her labs her CBC is normal her TSH is normal her cholesterol is 214 her HDL is okay her LDL is 137 her CMP is good her BUN is 17 creatinine is 1.88 GFR 64.  She talked to the OB/GYN about a bone densitometer and was really the Dr. Cartwright talked her about the bone densitometer and I think that is a good idea to do that her set up a bone densitometer Chay ordered the bone densitometer.  Her to see about a Prolia which I think is a good idea I am going to refill the sodium bicarb and the Phenergan     See dr mcgowan   Gi     She wants to wait  And try probiotic first.        Past Medical History:   Diagnosis Date   • Acute sinusitis    • Anemia    • H/O bone density study 08/2016    [facility]    • H/O Breast cancer     Left, 23 years ago.   • H/O complete eye exam 2014   • Hyperlipidemia    • Osteopenia    • Personal history of malignant  "neoplasm of breast    • Renal insufficiency    • UTI (urinary tract infection)         Thuy Blackwood 65 y.o. female who presents today for Medical Management of the below listed issues and medication refills.    ICD-10-CM ICD-9-CM   1. Osteopenia, unspecified location  M85.80 733.90   2. Renal insufficiency  N28.9 593.9        she has a problem list of   Patient Active Problem List   Diagnosis   • Hyperlipidemia   • Osteopenia   • Personal history of malignant neoplasm of breast   .    she has been compliant with   Current Outpatient Medications:   •  baclofen (LIORESAL) 10 MG tablet, TAKE 1 TABLET BY MOUTH EVERYDAY AT BEDTIME, Disp: 30 tablet, Rfl: 1  •  cholecalciferol (VITAMIN D3) 1000 UNITS tablet, Take 1,000 Units by mouth Every Other Day., Disp: , Rfl:   •  CINNAMON PO, Take  by mouth., Disp: , Rfl:   •  COCONUT OIL PO, Take  by mouth Daily., Disp: , Rfl:   •  MAGNESIUM-POTASSIUM PO, Take  by mouth 2 (Two) Times a Day As Needed., Disp: , Rfl:   •  Multiple Vitamin (MULTI-VITAMIN DAILY PO), Take by mouth daily., Disp: , Rfl:   •  promethazine (PHENERGAN) 25 MG tablet, Take 1 tablet by mouth Every 8 (Eight) Hours As Needed for Nausea or Vomiting., Disp: 30 tablet, Rfl: 1  •  sodium bicarbonate 650 MG tablet, Take 1 tablet by mouth 2 (Two) Times a Day., Disp: 60 tablet, Rfl: 5.  she denies medication side effects.        /69   Pulse 81   Temp 97.3 °F (36.3 °C)   Resp 16   Ht 165.1 cm (65\")   Wt 62.6 kg (138 lb)   SpO2 99%   BMI 22.96 kg/m²     Results for orders placed or performed in visit on 10/09/20   Comprehensive metabolic panel    Specimen: Blood   Result Value Ref Range    Glucose 87 65 - 99 mg/dL    BUN 17 8 - 23 mg/dL    Creatinine 0.88 0.57 - 1.00 mg/dL    eGFR Non African Am 64 >60 mL/min/1.73    eGFR African Am 78 >60 mL/min/1.73    BUN/Creatinine Ratio 19.3 7.0 - 25.0    Sodium 139 136 - 145 mmol/L    Potassium 4.4 3.5 - 5.2 mmol/L    Chloride 103 98 - 107 mmol/L    Total CO2 28.6 " 22.0 - 29.0 mmol/L    Calcium 9.7 8.6 - 10.5 mg/dL    Total Protein 6.9 6.0 - 8.5 g/dL    Albumin 4.60 3.50 - 5.20 g/dL    Globulin 2.3 gm/dL    A/G Ratio 2.0 g/dL    Total Bilirubin 0.4 0.0 - 1.2 mg/dL    Alkaline Phosphatase 54 39 - 117 U/L    AST (SGOT) 19 1 - 32 U/L    ALT (SGPT) 17 1 - 33 U/L   Lipid panel    Specimen: Blood   Result Value Ref Range    Total Cholesterol 214 (H) 0 - 200 mg/dL    Triglycerides 94 0 - 150 mg/dL    HDL Cholesterol 60 40 - 60 mg/dL    VLDL Cholesterol Will 17 5 - 40 mg/dL    LDL Chol Calc (NIH) 137 (H) 0 - 100 mg/dL   TSH    Specimen: Blood   Result Value Ref Range    TSH 2.390 0.270 - 4.200 uIU/mL   CBC and Differential    Specimen: Blood   Result Value Ref Range    WBC 4.28 3.40 - 10.80 10*3/mm3    RBC 4.15 3.77 - 5.28 10*6/mm3    Hemoglobin 12.9 12.0 - 15.9 g/dL    Hematocrit 39.8 34.0 - 46.6 %    MCV 95.9 79.0 - 97.0 fL    MCH 31.1 26.6 - 33.0 pg    MCHC 32.4 31.5 - 35.7 g/dL    RDW 12.3 12.3 - 15.4 %    Platelets 324 140 - 450 10*3/mm3    Neutrophil Rel % 61.1 42.7 - 76.0 %    Lymphocyte Rel % 23.8 19.6 - 45.3 %    Monocyte Rel % 11.7 5.0 - 12.0 %    Eosinophil Rel % 2.3 0.3 - 6.2 %    Basophil Rel % 0.9 0.0 - 1.5 %    Neutrophils Absolute 2.61 1.70 - 7.00 10*3/mm3    Lymphocytes Absolute 1.02 0.70 - 3.10 10*3/mm3    Monocytes Absolute 0.50 0.10 - 0.90 10*3/mm3    Eosinophils Absolute 0.10 0.00 - 0.40 10*3/mm3    Basophils Absolute 0.04 0.00 - 0.20 10*3/mm3    Immature Granulocyte Rel % 0.2 0.0 - 0.5 %    Immature Grans Absolute 0.01 0.00 - 0.05 10*3/mm3    nRBC 0.0 0.0 - 0.2 /100 WBC       The following portions of the patient's history were reviewed and updated as appropriate: allergies, current medications, past family history, past medical history, past social history, past surgical history and problem list.      she has a history of   Patient Active Problem List   Diagnosis   • Hyperlipidemia   • Osteopenia   • Personal history of malignant neoplasm of breast       Review  of Systems   Constitutional: Negative for fatigue and unexpected weight change.   HENT: Negative for sinus pressure.    Eyes: Negative for visual disturbance.   Respiratory: Negative for chest tightness and shortness of breath.    Cardiovascular: Negative for chest pain.   Gastrointestinal: Negative for abdominal pain.        Ibs symptoms   Genitourinary: Negative for dysuria and urgency.   Musculoskeletal: Negative for back pain.   Neurological: Negative for dizziness and headaches.   Psychiatric/Behavioral: Negative for confusion.       Objective   Physical Exam  Constitutional:       General: She is not in acute distress.  HENT:      Nose: No congestion.   Eyes:      Extraocular Movements: Extraocular movements intact.      Conjunctiva/sclera: Conjunctivae normal.      Pupils: Pupils are equal, round, and reactive to light.   Neck:      Musculoskeletal: Normal range of motion. No muscular tenderness.   Cardiovascular:      Rate and Rhythm: Normal rate.      Pulses: Normal pulses.   Pulmonary:      Effort: Pulmonary effort is normal. No respiratory distress.      Breath sounds: No rales.   Abdominal:      General: Bowel sounds are normal.      Palpations: There is no mass.      Tenderness: There is no abdominal tenderness.   Musculoskeletal: Normal range of motion.         General: No swelling or tenderness.      Right lower leg: No edema.      Left lower leg: No edema.   Lymphadenopathy:      Cervical: No cervical adenopathy.   Skin:     General: Skin is warm.      Findings: No rash.   Neurological:      General: No focal deficit present.      Mental Status: She is alert.   Psychiatric:         Mood and Affect: Mood normal.         Assessment/Plan   Diagnoses and all orders for this visit:    1. Osteopenia, unspecified location (Primary)    2. Renal insufficiency  -     sodium bicarbonate 650 MG tablet; Take 1 tablet by mouth 2 (Two) Times a Day.  Dispense: 60 tablet; Refill: 5    Other orders  -     promethazine  (PHENERGAN) 25 MG tablet; Take 1 tablet by mouth Every 8 (Eight) Hours As Needed for Nausea or Vomiting.  Dispense: 30 tablet; Refill: 1      Labs results discussed in detail with the patient, if no recent labs were done, order placed today.  Plan to update vaccines if needed today.  I  reviewed health maintenance with the patient as part of my preventative care plan. I discussed preventative counseling with the patient in detail.

## 2020-11-11 NOTE — TELEPHONE ENCOUNTER
Patient states that she has an approval letter from insurance for reclast. Wanting to know if this is something she needs to get scheduled or not. msg sent to  asking if he wants her to take this and if so when. Will notify scheduling when he responds.

## 2020-11-23 ENCOUNTER — TELEPHONE (OUTPATIENT)
Dept: ONCOLOGY | Facility: CLINIC | Age: 65
End: 2020-11-23

## 2020-11-23 NOTE — TELEPHONE ENCOUNTER
PATIENT ASKING ABOUT HER RECLAST. ANOTHER MSG SENT TO  SEEING IF PATIENT NEEDS TO BE SET UP FOR RECLAST OR NOT.

## 2020-11-23 NOTE — TELEPHONE ENCOUNTER
Patient called was following up on her request from 11/11, she has not been called to have this scheduled and the approval for the Injection ends on 1/4/21    Best call back number [phone]

## 2021-01-04 RX ORDER — BACLOFEN 10 MG/1
TABLET ORAL
Qty: 30 TABLET | Refills: 1 | Status: SHIPPED | OUTPATIENT
Start: 2021-01-04 | End: 2021-02-01

## 2021-02-01 RX ORDER — BACLOFEN 10 MG/1
TABLET ORAL
Qty: 30 TABLET | Refills: 1 | Status: SHIPPED | OUTPATIENT
Start: 2021-02-01 | End: 2021-03-01

## 2021-03-01 RX ORDER — BACLOFEN 10 MG/1
TABLET ORAL
Qty: 30 TABLET | Refills: 1 | Status: SHIPPED | OUTPATIENT
Start: 2021-03-01 | End: 2021-05-24

## 2021-03-19 ENCOUNTER — BULK ORDERING (OUTPATIENT)
Dept: CASE MANAGEMENT | Facility: OTHER | Age: 66
End: 2021-03-19

## 2021-03-19 ENCOUNTER — OFFICE VISIT (OUTPATIENT)
Dept: ONCOLOGY | Facility: CLINIC | Age: 66
End: 2021-03-19

## 2021-03-19 ENCOUNTER — INFUSION (OUTPATIENT)
Dept: ONCOLOGY | Facility: HOSPITAL | Age: 66
End: 2021-03-19

## 2021-03-19 ENCOUNTER — APPOINTMENT (OUTPATIENT)
Dept: ONCOLOGY | Facility: HOSPITAL | Age: 66
End: 2021-03-19

## 2021-03-19 VITALS
DIASTOLIC BLOOD PRESSURE: 75 MMHG | TEMPERATURE: 97.8 F | SYSTOLIC BLOOD PRESSURE: 152 MMHG | RESPIRATION RATE: 19 BRPM | OXYGEN SATURATION: 98 % | HEIGHT: 65 IN | HEART RATE: 81 BPM | BODY MASS INDEX: 23.53 KG/M2 | WEIGHT: 141.2 LBS

## 2021-03-19 DIAGNOSIS — M85.80 OSTEOPENIA, UNSPECIFIED LOCATION: ICD-10-CM

## 2021-03-19 DIAGNOSIS — Z85.3 PERSONAL HISTORY OF MALIGNANT NEOPLASM OF BREAST: Primary | ICD-10-CM

## 2021-03-19 DIAGNOSIS — R10.30 LOWER ABDOMINAL PAIN: ICD-10-CM

## 2021-03-19 DIAGNOSIS — R19.7 DIARRHEA, UNSPECIFIED TYPE: ICD-10-CM

## 2021-03-19 DIAGNOSIS — M85.89 OSTEOPENIA OF MULTIPLE SITES: Primary | ICD-10-CM

## 2021-03-19 DIAGNOSIS — M85.89 OSTEOPENIA OF MULTIPLE SITES: ICD-10-CM

## 2021-03-19 DIAGNOSIS — Z23 IMMUNIZATION DUE: ICD-10-CM

## 2021-03-19 LAB
ALBUMIN SERPL-MCNC: 4.3 G/DL (ref 3.5–5.2)
ALBUMIN/GLOB SERPL: 1.4 G/DL (ref 1.1–2.4)
ALP SERPL-CCNC: 56 U/L (ref 38–116)
ALT SERPL W P-5'-P-CCNC: 16 U/L (ref 0–33)
ANION GAP SERPL CALCULATED.3IONS-SCNC: 8.3 MMOL/L (ref 5–15)
AST SERPL-CCNC: 20 U/L (ref 0–32)
BASOPHILS # BLD AUTO: 0.03 10*3/MM3 (ref 0–0.2)
BASOPHILS NFR BLD AUTO: 0.7 % (ref 0–1.5)
BILIRUB SERPL-MCNC: 0.4 MG/DL (ref 0.2–1.2)
BUN SERPL-MCNC: 19 MG/DL (ref 6–20)
BUN/CREAT SERPL: 20.4 (ref 7.3–30)
CALCIUM SPEC-SCNC: 9.6 MG/DL (ref 8.5–10.2)
CHLORIDE SERPL-SCNC: 104 MMOL/L (ref 98–107)
CO2 SERPL-SCNC: 27.7 MMOL/L (ref 22–29)
CREAT SERPL-MCNC: 0.93 MG/DL (ref 0.6–1.1)
DEPRECATED RDW RBC AUTO: 48.3 FL (ref 37–54)
EOSINOPHIL # BLD AUTO: 0.17 10*3/MM3 (ref 0–0.4)
EOSINOPHIL NFR BLD AUTO: 3.8 % (ref 0.3–6.2)
ERYTHROCYTE [DISTWIDTH] IN BLOOD BY AUTOMATED COUNT: 13.2 % (ref 12.3–15.4)
GFR SERPL CREATININE-BSD FRML MDRD: 60 ML/MIN/1.73
GLOBULIN UR ELPH-MCNC: 3.1 GM/DL (ref 1.8–3.5)
GLUCOSE SERPL-MCNC: 89 MG/DL (ref 74–124)
HCT VFR BLD AUTO: 40.8 % (ref 34–46.6)
HGB BLD-MCNC: 12.9 G/DL (ref 12–15.9)
IMM GRANULOCYTES # BLD AUTO: 0.01 10*3/MM3 (ref 0–0.05)
IMM GRANULOCYTES NFR BLD AUTO: 0.2 % (ref 0–0.5)
LYMPHOCYTES # BLD AUTO: 1.13 10*3/MM3 (ref 0.7–3.1)
LYMPHOCYTES NFR BLD AUTO: 25.4 % (ref 19.6–45.3)
MAGNESIUM SERPL-MCNC: 2 MG/DL (ref 1.8–2.5)
MCH RBC QN AUTO: 31.2 PG (ref 26.6–33)
MCHC RBC AUTO-ENTMCNC: 31.6 G/DL (ref 31.5–35.7)
MCV RBC AUTO: 98.8 FL (ref 79–97)
MONOCYTES # BLD AUTO: 0.6 10*3/MM3 (ref 0.1–0.9)
MONOCYTES NFR BLD AUTO: 13.5 % (ref 5–12)
NEUTROPHILS NFR BLD AUTO: 2.51 10*3/MM3 (ref 1.7–7)
NEUTROPHILS NFR BLD AUTO: 56.4 % (ref 42.7–76)
NRBC BLD AUTO-RTO: 0 /100 WBC (ref 0–0.2)
PHOSPHATE SERPL-MCNC: 3.8 MG/DL (ref 2.5–4.5)
PLATELET # BLD AUTO: 286 10*3/MM3 (ref 140–450)
PMV BLD AUTO: 9.2 FL (ref 6–12)
POTASSIUM SERPL-SCNC: 4.4 MMOL/L (ref 3.5–4.7)
PROT SERPL-MCNC: 7.4 G/DL (ref 6.3–8)
RBC # BLD AUTO: 4.13 10*6/MM3 (ref 3.77–5.28)
SODIUM SERPL-SCNC: 140 MMOL/L (ref 134–145)
WBC # BLD AUTO: 4.45 10*3/MM3 (ref 3.4–10.8)

## 2021-03-19 PROCEDURE — 83735 ASSAY OF MAGNESIUM: CPT

## 2021-03-19 PROCEDURE — 84100 ASSAY OF PHOSPHORUS: CPT

## 2021-03-19 PROCEDURE — 99215 OFFICE O/P EST HI 40 MIN: CPT | Performed by: INTERNAL MEDICINE

## 2021-03-19 PROCEDURE — 80053 COMPREHEN METABOLIC PANEL: CPT

## 2021-03-19 PROCEDURE — 85025 COMPLETE CBC W/AUTO DIFF WBC: CPT

## 2021-03-19 PROCEDURE — 25010000002 ZOLEDRONIC ACID PER 1 MG: Performed by: INTERNAL MEDICINE

## 2021-03-19 PROCEDURE — 96374 THER/PROPH/DIAG INJ IV PUSH: CPT

## 2021-03-19 RX ORDER — SODIUM CHLORIDE 9 MG/ML
250 INJECTION, SOLUTION INTRAVENOUS ONCE
Status: CANCELLED | OUTPATIENT
Start: 2021-03-19

## 2021-03-19 RX ORDER — SODIUM CHLORIDE 9 MG/ML
250 INJECTION, SOLUTION INTRAVENOUS ONCE
Status: COMPLETED | OUTPATIENT
Start: 2021-03-19 | End: 2021-03-19

## 2021-03-19 RX ORDER — ZOLEDRONIC ACID 5 MG/100ML
4 INJECTION, SOLUTION INTRAVENOUS ONCE
Status: DISCONTINUED | OUTPATIENT
Start: 2021-03-19 | End: 2021-03-19

## 2021-03-19 RX ORDER — ZOLEDRONIC ACID 5 MG/100ML
4 INJECTION, SOLUTION INTRAVENOUS ONCE
Status: CANCELLED | OUTPATIENT
Start: 2021-03-19

## 2021-03-19 RX ADMIN — ZOLEDRONIC ACID 4 MG: 4 INJECTION, SOLUTION, CONCENTRATE INTRAVENOUS at 10:50

## 2021-03-19 RX ADMIN — SODIUM CHLORIDE 250 ML: 9 INJECTION, SOLUTION INTRAVENOUS at 10:50

## 2021-03-19 NOTE — PROGRESS NOTES
REASON FOR FOLLOWUP:1.  History of left breast cancer 28 years ago.  Left chest wall remains normal.  Right breast examination is normal.        2> osteopenia by DEXA scan initiation dose adjusted Reclast 3/21 and once a year      HISTORY OF PRESENT ILLNESS:   PATIENT WAS CALLED THE DAY BEFORE BY THE OFFICE TO ASK FOR SYMPTOMS THAT COULD BE CONSISTENT WITH CORONAVIRUS INFECTION, AND BEING NEGATIVE WAS SCHEDULED TO BE SEEN IN THE OFFICE TODAY. SIMILAR QUESTIONING TODAY INCLUDING, CHILLS, FEVER, NEW COUGH, SHORTNESS OF BREATH, DIARRHEA,DIFFUSE BODY ACHES  AND CHANGES IN SMELL OR TASTE WERE NEGATIVE.THE PATIENT DENIED ANY CONTACT WITH PERSONS WHO WERE POSITIVE FOR COVID, AND PATIENT IS NOT IN CATEGORY OF HIGH RISK BEHAVIOR TO ACQUIRE COVID.    DURING THE VISIT WITH THE PATIENT TODAY , PATIENT HAD FACE MASK, MY MEDICAL ASSISTANT AND I  HAD PROPPER PROTECTIVE EQUIPMENT, AND I DID HAND HYGIENE WITH SOAP AND WATER BEFORE AND AFTER THE VISIT.  This patient returns today to the office for follow up. She is here today by herself stating that she has already had COVID vaccination with no difficulties and she has developed a new symptom in the last 3-4 months that has become more continuous and more obvious on a daily basis abdominal pain in the lower abdomen crampy in nature not propagated and always associated with a loose bowel activity. She has sensation of bloating and distention. She has not had any passage of blood in the stool. She has not lost any weight. It does not matter what kind of food she eats, the symptom remains an ongoing issue. She denies any heartburn or indigestion, nausea, vomiting, jaundice, chills or fever.     Besides this she has not had any new health issues. She has normal urination. No cardiovascular or respiratory symptomatology, no bone or joint pain. I discussed with her during the previous visit the fact that she has by DEXA scan osteopenia and we finally got her Reclast  "approved for her to initiate it today. The dose will be adjusted given her degree of kidney dysfunction.             Past Medical History:   Diagnosis Date   • Acute sinusitis    • Anemia    • H/O bone density study 2016    [facility]    • H/O Breast cancer     Left, 23 years ago.   • H/O complete eye exam    • Hyperlipidemia    • Osteopenia    • Personal history of malignant neoplasm of breast    • Renal insufficiency    • UTI (urinary tract infection)      Past Surgical History:   Procedure Laterality Date   • BLADDER SUSPENSION     • CHOLECYSTECTOMY  2010    Laparoscopic   • COLONOSCOPY     • ENDOSCOPY AND COLONOSCOPY  2010    Upper and lower GI endoscopies performed by Dr. Lawton showed gastritis but no other pathological diagnosis made.   • HAND SURGERY     • HYSTERECTOMY      partial; ovaries in situ   • MAMMO BILATERAL  2016    [facility] does breast exams   • MASTECTOMY     • PAP SMEAR  scheduled    p hyst . dr nora dozier   • TUBAL ABDOMINAL LIGATION       Social History     Socioeconomic History   • Marital status:      Spouse name: Not on file   • Number of children: Not on file   • Years of education: Not on file   • Highest education level: Not on file   Tobacco Use   • Smoking status: Never Smoker   • Smokeless tobacco: Never Used   Substance and Sexual Activity   • Alcohol use: Yes     Comment: occasional   • Drug use: No     Family History   Problem Relation Age of Onset   • Depression Other    • Heart disease Other    • Hypertension Other    • Heart attack Other    • Rheum arthritis Other                  VITAL SIGNS:    Vitals:    21 0940   BP: 152/75   Pulse: 81   Resp: 19   Temp: 97.8 °F (36.6 °C)   TempSrc: Temporal   SpO2: 98%   Weight: 64 kg (141 lb 3.2 oz)   Height: 165.1 cm (65\")          PAIN:  0 out of 10     ECO        PHYSICAL EXAMINATION:    I HAVE PERSONALLY REVIEWED THE HISTORY OF THE PRESENT ILLNESS, PAST MEDICAL HISTORY, FAMILY " HISTORY, SOCIAL HISTORY, ALLERGIES, MEDICATIONS STATED ABOVE IN THE OFFICE NOTE FROM TODAY.    Patient screened  for depression based on a PHQ-9 score of 0 on 3/19/2021.   GENERAL:  Well-developed, well-nourished  Patient  in no acute distress.   SKIN:  Warm, dry ,NO rashes,NO purpura ,NO petechiae.  HEENT:  Pupils were equal and reactive to light and accomodation, conjunctivae noninjected, no pterygium, normal extraocular movements, normal visual acuity.   NECK:  Supple with good range of motion; no thyromegaly or masses, no JVD or bruits, no cervical adenopathies.No carotid artery pain, no carotid abnormal pulsation , NO arterial dance.  LYMPHATICS:  No cervical, NO supraclavicular, NO axillary,NO epitrochlear , NO inguinal adenopathy.  CARDIAC   normal rate and regular rhythm, without murmur,NO rubs NO S3 NO S4 right or left . Normal femoral, popliteal, pedis, brachial and carotid pulses.  LUNGS NORMAL BS BILATERALLY.  INSPECTION of  breast documented symmetry of the tissue per se and location and size of the nipple,no retractions or inversion of the nipple, normal skin without lesions, no erythema or nodules,no peau d'orange, no prominence of superficial veins or chest wall collateral circulation.PALPATION of the breast documented normal skin turgor, no induration, alteration in local temperature, or pain, no palpable masses or nodules, normal mobility of the tissues,no fixation of the tissue or parenchyma to the chest wall, no alteration at the tail of the breasts or axillas, no adenopathies.LEFT SIDE MASTECTOMY Surgical site was well healed.No lymphedema in either extremity.  VASCULAR ARTERIAL: normal carotids,brachial,radial,femoral,popliteal, pedis pulses , no bruits.no paleness or cyanosis, no pain, no edema, no numbness, no gangrene.  VASCULAR VENOUS: no cyanosis, collateral circulation, varicosities, edema, palpable cords, pain, erythema.  ABDOMEN:  Soft, nontender with no hepatomegaly, no splenomegaly,no  masses, no ascites, no collateral circulation,no distention,no Page sign, mild lower abdominal pain, no inguinal hernias,no umbilical hernia, no abdominal bruits. Normal bowel sounds.  GENITAL: Not  Performed.  EXTREMITIES  AND SPINE:  No clubbing, cyanosis or edema, no deformities or pain .No kyphosis, scoliosis, deformities or pain in spine, ribs or pelvic bone.  NEUROLOGICAL:  Patient was awake, alert, oriented to time, person and place.                  LABORATORY DATA:[id number] - Part of Panel Order [id number]  Collected:  3/19/2021 09:14  Specimen Information: Blood        View Full Report  Component   Ref Range & Units 09:14   WBC   3.40 - 10.80 10*3/mm3 4.45    RBC   3.77 - 5.28 10*6/mm3 4.13    Hemoglobin   12.0 - 15.9 g/dL 12.9    Hematocrit   34.0 - 46.6 % 40.8    MCV   79.0 - 97.0 fL 98.8High     MCH   26.6 - 33.0 pg 31.2    MCHC   31.5 - 35.7 g/dL 31.6    RDW   12.3 - 15.4 % 13.2    RDW-SD   37.0 - 54.0 fl 48.3    MPV   6.0 - 12.0 fL 9.2    Platelets   140 - 450 10*3/mm3 286    Neutrophil %   42.7 - 76.0 % 56.4    Lymphocyte %   19.6 - 45.3 % 25.4    Monocyte %   5.0 - 12.0 % 13.5High     Eosinophil %   0.3 - 6.2 % 3.8    Basophil %   0.0 - 1.5 % 0.7    Immature Grans %   0.0 - 0.5 % 0.2    Neutrophils, Absolute   1.70 - 7.00 10*3/mm3 2.51    Lymphocytes, Absolute   0.70 - 3.10 10*3/mm3 1.13    Monocytes, Absolute   0.10 - 0.90 10*3/mm3 0.60    Eosinophils, Absolute   0.00 - 0.40 10*3/mm3 0.17    Basophils, Absolute   0.00 - 0.20 10*3/mm3 0.03    Immature Grans, Absolute   0.00 - 0.05 10*3/mm3 0.01    nRBC   0.0 - 0.2 /100 WBC 0.0                Contains abnormal data Comprehensive metabolic panel  Order: [id number]  Collected:  3/19/2021 09:14  Specimen Information: Blood        View Full Report  Component   Ref Range & Units 09:14   Glucose   74 - 124 mg/dL 89    BUN   6 - 20 mg/dL 19    Creatinine   0.60 - 1.10 mg/dL 0.93    Sodium   134 - 145 mmol/L 140    Potassium   3.5 - 4.7 mmol/L 4.4     Chloride   98 - 107 mmol/L 104    CO2   22.0 - 29.0 mmol/L 27.7    Calcium   8.5 - 10.2 mg/dL 9.6    Total Protein   6.3 - 8.0 g/dL 7.4    Albumin   3.50 - 5.20 g/dL 4.30    ALT (SGPT)   0 - 33 U/L 16    AST (SGOT)   0 - 32 U/L 20    Alkaline Phosphatase   38 - 116 U/L 56    Total Bilirubin   0.2 - 1.2 mg/dL 0.4    eGFR Non African Amer   >60 mL/min/1.73 60Low     Globulin   1.8 - 3.5 gm/dL 3.1    A/G Ratio   1.1 - 2.4 g/dL 1.4    BUN/Creatinine Ratio   7.3 - 30.0 20.4    Anion Gap   5.0 - 15.0 mmol/L 8.3             Related Result Highlights     Magnesium  Final result 3/19/2021             Phosphorus  Final result 3/19/2021                                          ASSESSMENT: 1. This patient has a history of left breast cancer 27 years ago.  Normal  r breast examination today.  Mammogram   has been normal.  As far as I can tell, there is no evidence of breast cancer recurrence.  Her right breast examination is normal, her left mastectomy site is normal and she has no symptoms or signs that would indicate any breast cancer recurrence.        2.The patient has documented on the DEXA scan done last year osteopenia with very progressive amount of bone mass loss. It took me forever to be able to ask her insurance company to allow us to proceed with therapy either with Prolia or with Reclast. They finally got Reclast approved and the patient will initiate this treatment today. The dose was adjusted from 5 mg to 4 mg infusion in order to minimize any consequences in regard kidney function. Her creatinine today is 0.93, her magnesium, phosphorus and BUN are normal as well as her potassium.     She knows that she will receive the Reclast once a year and after probably 2 injections will proceed with another DEXA scan.     The patient was advised to follow up with her dentist and point out to her dentist that she has received this medicine. This is with the goal to minimize any possibility of developing an osteonecrosis of  the jaw in the future.     The patient has a new symptom. For the last 3-4 months she has had persistent abdominal pain crampy in nature in the lower abdomen always associated with abdominal distention and bloating that happens before defecation and develops a loose bowel movement on daily basis with no mucous and no passage of blood. She has not had any association of this with any food or activity. I have reviewed her data at the time that she had a colonoscopy back many years ago by Jesse Lawton MD and in the pathology report of this shows an element of microscopic colitis. She has had always problems in preparation for colonoscopy unable to tolerate this.    I do believe that given the symptoms the patient needs to proceed with a CT scan of the abdomen and pelvis and also I have referred her to see general surgery, Keke Yarbrough MD, to consider strongly a new colonoscopy. Special considerations will be needed in regard the preparation as I stated above.    I would like to review the CT scan with her on the telephone in a couple of weeks in a video medicine visit or telephonic visit.     I would not be surprised if this patient has some form of colitis producing the symptomatology. She has had Cologuard testing more than 5 years ago that was negative but I think my suggestion is that I am prepared to see microscopic colitis and things of this nature and to be sure that she does not have initial steps of inflammatory bowel disease like ulcerative colitis. She is not passing any blood, she has no anemia therefore we need to review this information. CT scan of the abdomen and pelvis will be useful to evaluate her ovaries and her pelvic anatomy. She has had a hysterectomy and we need to be sure that there is no other secondary confusing pathology that is producing this process.

## 2021-03-25 ENCOUNTER — HOSPITAL ENCOUNTER (OUTPATIENT)
Dept: PET IMAGING | Facility: HOSPITAL | Age: 66
Discharge: HOME | End: 2021-03-25
Admitting: INTERNAL MEDICINE

## 2021-03-25 DIAGNOSIS — R19.7 DIARRHEA, UNSPECIFIED TYPE: ICD-10-CM

## 2021-03-25 DIAGNOSIS — R10.30 LOWER ABDOMINAL PAIN: ICD-10-CM

## 2021-03-25 PROCEDURE — 74176 CT ABD & PELVIS W/O CONTRAST: CPT

## 2021-03-28 ENCOUNTER — TELEPHONE (OUTPATIENT)
Dept: ONCOLOGY | Facility: CLINIC | Age: 66
End: 2021-03-28

## 2021-03-28 NOTE — TELEPHONE ENCOUNTER
CT ABDOMEN AND PELVIS WITHOUT CONTRAST     HISTORY: Lower abdominal pain, diarrhea.     TECHNIQUE: Axial CT images of the abdomen and pelvis were obtained  without administration of intravenous contrast. The patient was not  given oral contrast. Coronal and sagittal reformats were obtained.     COMPARISON: CT abdomen and pelvis from 06/03/2020.     FINDINGS: Noncontrast attenuation of the liver is normal. Small  hypoattenuating focus seen within the anterior segment 5 and within the  inferior right hepatic lobe on image 58 is favored to be artifactual.  The gallbladder is surgically absent. The spleen shows punctate  calcified granulomas and is otherwise normal. The pancreas is normal  without ductal dilatation. Bilateral adrenal glands and kidneys are  normal. The urinary bladder is minimally distended limiting evaluation.  The uterus is not identified and is likely surgically absent. The small  and large bowel loops demonstrate normal caliber. There is a large  air-fluid level seen within a dilated duodenal bulb that also contains  numerous diverticula. The appearance is progressive since prior imaging  in 2010. Moderate amount of formed stool is seen within the colon. No  ascites is seen. No pathological retroperitoneal lymphadenopathy.  Moderate calcified atherosclerotic plaque is seen within the abdominal  aorta and its branches. Small sclerotic focus seen within the right  iliac bone is unchanged from 2006 and thus favored to represent a bone  island.     IMPRESSION:  Study is limited in the absence of IV contrast.  No acute abnormality within the abdomen and pelvis.     Radiation dose reduction techniques were utilized, including automated  exposure control and exposure modulation based on body size.     This report was finalized on 3/26/2021 11:23 AM by Dr. Zulema Walton,    I have called this patient today and let her know that she has a large diverticulum in the duodenum. Given the circumstances I wonder  if some of the symptoms that she is experiencing with bloating and diarrhea is evidence of bacterial overgrowth syndrome. I will ask her endoscopist in the next dew days or so to pursue not only the colonoscopy in a few weeks but also an upper GI endoscopy. I will not be surprised if what we see clinically is manifestation of bacterial overgrowth syndrome in this patient. Will wait for the final reports of the endoscopies to make further decision. The patient agrees. I will be back in touch with her.

## 2021-03-29 ENCOUNTER — TELEPHONE (OUTPATIENT)
Dept: ONCOLOGY | Facility: CLINIC | Age: 66
End: 2021-03-29

## 2021-03-29 RX ORDER — BACLOFEN 10 MG/1
TABLET ORAL
Qty: 30 TABLET | Refills: 1 | OUTPATIENT
Start: 2021-03-29

## 2021-03-29 NOTE — TELEPHONE ENCOUNTER
Caller: LULU    Relationship: PATIENT    Best call back number: [phone]     What test was performed: CAT SCAN    When was the test performed: 03/25    Where was the test performed:  [facility]    Additional notes: DR HITCHCOCK GAVE HER THE RESULTS BUT SHE WAS DRIVING SO SHE COULDN'T HEAR WELL

## 2021-03-29 NOTE — TELEPHONE ENCOUNTER
Spoke with patient to go over her ct again with her after Dr. Dinh did yesterday. She stated that she was not fully understanding d/t the medical terminology. I went through what Dr. Dinh note stated about several diverticulum in her small intestine and how that relates to her bloating and diarrhea. Her other questions she had about management and what to do about them, I let her know that we will have to wait for further imaging to determine the severity of it and treatment plan. Patient wrote all terminology down to reference. Apt with Dr. Yarbrough on Tuesday 4/6/2021. No further questions.

## 2021-03-30 ENCOUNTER — TELEPHONE (OUTPATIENT)
Dept: SURGERY | Facility: CLINIC | Age: 66
End: 2021-03-30

## 2021-04-06 ENCOUNTER — OFFICE VISIT (OUTPATIENT)
Dept: SURGERY | Facility: CLINIC | Age: 66
End: 2021-04-06

## 2021-04-06 VITALS — WEIGHT: 138 LBS | BODY MASS INDEX: 22.99 KG/M2 | HEIGHT: 65 IN

## 2021-04-06 DIAGNOSIS — K57.10 DUODENAL DIVERTICULUM: Primary | ICD-10-CM

## 2021-04-06 DIAGNOSIS — A09 DIARRHEA, INFECTIOUS, ADULT: ICD-10-CM

## 2021-04-06 DIAGNOSIS — K59.1 FUNCTIONAL DIARRHEA: ICD-10-CM

## 2021-04-06 PROCEDURE — 99204 OFFICE O/P NEW MOD 45 MIN: CPT | Performed by: SURGERY

## 2021-04-07 ENCOUNTER — LAB (OUTPATIENT)
Dept: LAB | Facility: HOSPITAL | Age: 66
End: 2021-04-07

## 2021-04-07 DIAGNOSIS — A09 DIARRHEA, INFECTIOUS, ADULT: ICD-10-CM

## 2021-04-07 DIAGNOSIS — K59.1 FUNCTIONAL DIARRHEA: ICD-10-CM

## 2021-04-07 LAB
ADV 40+41 DNA STL QL NAA+NON-PROBE: NOT DETECTED
ASTRO TYP 1-8 RNA STL QL NAA+NON-PROBE: NOT DETECTED
C CAYETANENSIS DNA STL QL NAA+NON-PROBE: NOT DETECTED
C COLI+JEJ+UPSA DNA STL QL NAA+NON-PROBE: NOT DETECTED
C DIFF TOX GENS STL QL NAA+PROBE: NEGATIVE
CRYPTOSP DNA STL QL NAA+NON-PROBE: NOT DETECTED
E HISTOLYT DNA STL QL NAA+NON-PROBE: NOT DETECTED
EAEC PAA PLAS AGGR+AATA ST NAA+NON-PRB: NOT DETECTED
EC STX1+STX2 GENES STL QL NAA+NON-PROBE: NOT DETECTED
EPEC EAE GENE STL QL NAA+NON-PROBE: NOT DETECTED
ETEC LTA+ST1A+ST1B TOX ST NAA+NON-PROBE: NOT DETECTED
G LAMBLIA DNA STL QL NAA+NON-PROBE: NOT DETECTED
NOROVIRUS GI+II RNA STL QL NAA+NON-PROBE: NOT DETECTED
P SHIGELLOIDES DNA STL QL NAA+NON-PROBE: NOT DETECTED
RVA RNA STL QL NAA+NON-PROBE: NOT DETECTED
S ENT+BONG DNA STL QL NAA+NON-PROBE: NOT DETECTED
SAPO I+II+IV+V RNA STL QL NAA+NON-PROBE: NOT DETECTED
SHIGELLA SP+EIEC IPAH ST NAA+NON-PROBE: NOT DETECTED
V CHOL+PARA+VUL DNA STL QL NAA+NON-PROBE: NOT DETECTED
V CHOLERAE DNA STL QL NAA+NON-PROBE: NOT DETECTED
Y ENTEROCOL DNA STL QL NAA+NON-PROBE: NOT DETECTED

## 2021-04-07 PROCEDURE — 0097U HC BIOFIRE FILMARRAY GI PANEL: CPT

## 2021-04-07 PROCEDURE — 87493 C DIFF AMPLIFIED PROBE: CPT

## 2021-04-08 PROBLEM — K57.10 DUODENAL DIVERTICULUM: Status: ACTIVE | Noted: 2021-04-08

## 2021-04-08 PROBLEM — K59.1 FUNCTIONAL DIARRHEA: Status: ACTIVE | Noted: 2021-04-08

## 2021-04-13 ENCOUNTER — TRANSCRIBE ORDERS (OUTPATIENT)
Dept: SLEEP MEDICINE | Facility: HOSPITAL | Age: 66
End: 2021-04-13

## 2021-04-13 DIAGNOSIS — Z01.818 OTHER SPECIFIED PRE-OPERATIVE EXAMINATION: Primary | ICD-10-CM

## 2021-04-27 ENCOUNTER — LAB (OUTPATIENT)
Dept: LAB | Facility: HOSPITAL | Age: 66
End: 2021-04-27

## 2021-04-27 DIAGNOSIS — Z01.818 OTHER SPECIFIED PRE-OPERATIVE EXAMINATION: ICD-10-CM

## 2021-04-27 PROCEDURE — C9803 HOPD COVID-19 SPEC COLLECT: HCPCS

## 2021-04-27 PROCEDURE — U0004 COV-19 TEST NON-CDC HGH THRU: HCPCS

## 2021-04-27 NOTE — PROGRESS NOTES
General Surgery  Initial Office Visit    CC: Diarrhea, abnormal duodenum on CT imaging    HPI: The patient is a pleasant 65 y.o. year-old lady who presents today for evaluation of constant progressively worsening watery diarrhea that has been ongoing for the last 4 months.  She says the symptoms began suddenly with no obvious precipitating factors and have been constant since their onset.  She has 2-3 loose bowel movements a day and has not noticed any change with any sort of food or alcohol intake.  She tried taking a probiotic but has not noticed any improvement with that intervention either.  She has had associated abdominal bloating and cramping abdominal pains before each bowel movement.  She underwent EGD and colonoscopy about 10 years ago with Dr. Lawton and does not recall there being any major abnormalities.  In 2019 she also performed a Cologuard test which was negative.  She recently had a CT abdomen/pelvis which showed an enlarging duodenal diverticulum and she was referred to see me to determine if this duodenal diverticulum may require surgery and/or may be related to her diarrhea.    Past Medical History:   History of breast cancer (left)  Hyperlipidemia  Chronic kidney disease  Osteoarthritis  Postoperative nausea and vomiting    Past Surgical History:   Laparoscopic cholecystectomy (2010)  EGD and colonoscopy (2010, Dr. Lawton-reportedly showed gastritis but no other abnormalities)  Hysterectomy  Left mastectomy  Bilateral tubal ligation  Bladder suspension  Hand surgery    Medications:   Baclofen 10 mg nightly  Vitamin D3 1000 units every other day  Cinnamon 1000 mg twice daily  Coconut oil 1000 mg twice daily  Magnesium-potassium 700 mg nightly  Multivitamin once daily  Phenergan as needed for nausea  Sodium bicarbonate 650 mg twice daily    Allergies: Doxycycline (myalgias), Levaquin (anxious, jittery)    Family History: Great aunt and cousin with history of cancer of some sort, mother and father  both had history of coronary artery disease    Social History: , retired, non-smoker, social alcohol use    ROS:   Constitutional: Negative for fevers or chills  HENT: Negative for hearing loss or runny nose  Eyes: Negative for vision changes or scleral icterus  Respiratory: Negative for cough or shortness of breath  Cardiovascular: Negative for chest pain or heart palpitations  Gastrointestinal: Positive for abdominal pain and diarrhea; negative for abdominal distension, nausea, vomiting, constipation, melena, or hematochezia  Genitourinary: Negative for hematuria or dysuria  Musculoskeletal: Negative for joint swelling or gait instability  Neurologic: Negative for tremors or seizures  Psychiatric: Negative for suicidal ideations or agitation  All other systems reviewed and negative    Physical Exam:  Height: 165 cm  Weight: 62 kg  BMI: 22.96  General: No acute distress, well-nourished & well-developed  HEAD: normocephalic, atraumatic  EYES: normal conjunctiva, sclera anicteric  EARS: grossly normal hearing  NECK: supple, no thyromegaly  CARDIOVASCULAR: regular rate and rhythm  RESPIRATORY: clear to auscultation bilaterally  GASTROINTESTINAL: soft, nontender, non-distended  MUSCULOSKELETAL: normal gait and station. No gross extremity abnormalities  PSYCHIATRIC: oriented x3, normal mood and affect    IMAGING:  CT ABD/PELVIS (3/25/2021):  FINDINGS: Noncontrast attenuation of the liver is normal. Small hypoattenuating focus seen within the anterior segment 5 and within the inferior right hepatic lobe on image 58 is favored to be artifactual. The gallbladder is surgically absent. The spleen shows punctate calcified granulomas and is otherwise normal. The pancreas is normal without ductal dilatation. Bilateral adrenal glands and kidneys are normal. The urinary bladder is minimally distended limiting evaluation. The uterus is not identified and is likely surgically absent. The small and large bowel loops  demonstrate normal caliber. There is a large air-fluid level seen within a dilated duodenal bulb that also contains numerous diverticula. The appearance is progressive since prior imaging in 2010. Moderate amount of formed stool is seen within the colon. No ascites is seen. No pathological retroperitoneal lymphadenopathy. Moderate calcified atherosclerotic plaque is seen within the abdominal aorta and its branches. Small sclerotic focus seen within the right iliac bone is unchanged from 2006 and thus favored to represent a bone island.     IMPRESSION:  Study is limited in the absence of IV contrast. No acute abnormality within the abdomen and pelvis.    ASSESSMENT & PLAN  Mrs. Blackwood is a 65-year-old lady with an incidentally discovered duodenal diverticulum on recent CT scan, not likely contributing to her diarrhea.  Her diarrhea could be infectious, inflammatory, ischemic, etc.  I reviewed the CT abdomen/pelvis that was recently done and showed her the imaging today.  There is no sign of diverticulitis, gastroenteritis, etc.  I would recommend we check some stool studies including a gastrointestinal PCR panel and C. difficile test.  If negative, I would recommend we go ahead and proceed with colonoscopy to perform biopsies and test her for microscopic versus lymphocytic colitis.  At the same time I would be happy to perform an EGD to test her for celiac disease and to fully evaluate the size of the duodenal diverticulum.  I explained that duodenal diverticula are very common and always asymptomatic.  They never really require surgery unless they perforate, which I have never seen happen except during instrumentation such as with the ERCPs.  She expressed relief that she would not have to have surgery and she will try to submit a stool sample later this afternoon.  I will call her with those results and would be happy to set up her EGD and colonoscopy for anytime in the next couple of weeks.    Keke Yarbrough,  MD  General, Robotic, and Endoscopic Surgery  [facility]    [address], [address]  [place] [address]  P: [phone]  F: [phone]

## 2021-04-28 LAB — SARS-COV-2 RNA RESP QL NAA+PROBE: NOT DETECTED

## 2021-04-29 ENCOUNTER — ANESTHESIA EVENT (OUTPATIENT)
Dept: GASTROENTEROLOGY | Facility: HOSPITAL | Age: 66
End: 2021-04-29

## 2021-04-29 ENCOUNTER — HOSPITAL ENCOUNTER (OUTPATIENT)
Facility: HOSPITAL | Age: 66
Setting detail: HOSPITAL OUTPATIENT SURGERY
Discharge: HOME | End: 2021-04-29
Attending: SURGERY | Admitting: SURGERY

## 2021-04-29 ENCOUNTER — ANESTHESIA (OUTPATIENT)
Dept: GASTROENTEROLOGY | Facility: HOSPITAL | Age: 66
End: 2021-04-29

## 2021-04-29 VITALS
OXYGEN SATURATION: 95 % | HEART RATE: 62 BPM | BODY MASS INDEX: 22.99 KG/M2 | RESPIRATION RATE: 16 BRPM | WEIGHT: 138 LBS | HEIGHT: 65 IN | DIASTOLIC BLOOD PRESSURE: 64 MMHG | SYSTOLIC BLOOD PRESSURE: 116 MMHG

## 2021-04-29 DIAGNOSIS — K57.10 DUODENAL DIVERTICULUM: ICD-10-CM

## 2021-04-29 DIAGNOSIS — K59.1 FUNCTIONAL DIARRHEA: ICD-10-CM

## 2021-04-29 PROBLEM — K29.00 ACUTE SUPERFICIAL GASTRITIS WITHOUT HEMORRHAGE: Status: ACTIVE | Noted: 2021-04-29

## 2021-04-29 PROBLEM — K64.8 INTERNAL HEMORRHOIDS: Status: ACTIVE | Noted: 2021-04-29

## 2021-04-29 PROCEDURE — 88342 IMHCHEM/IMCYTCHM 1ST ANTB: CPT | Performed by: SURGERY

## 2021-04-29 PROCEDURE — 88305 TISSUE EXAM BY PATHOLOGIST: CPT | Performed by: SURGERY

## 2021-04-29 PROCEDURE — 45380 COLONOSCOPY AND BIOPSY: CPT | Performed by: SURGERY

## 2021-04-29 PROCEDURE — 25010000002 PROPOFOL 10 MG/ML EMULSION: Performed by: ANESTHESIOLOGY

## 2021-04-29 PROCEDURE — 88312 SPECIAL STAINS GROUP 1: CPT | Performed by: SURGERY

## 2021-04-29 PROCEDURE — 43239 EGD BIOPSY SINGLE/MULTIPLE: CPT | Performed by: SURGERY

## 2021-04-29 RX ORDER — LIDOCAINE HYDROCHLORIDE 20 MG/ML
INJECTION, SOLUTION INFILTRATION; PERINEURAL AS NEEDED
Status: DISCONTINUED | OUTPATIENT
Start: 2021-04-29 | End: 2021-04-29 | Stop reason: SURG

## 2021-04-29 RX ORDER — PROPOFOL 10 MG/ML
VIAL (ML) INTRAVENOUS CONTINUOUS PRN
Status: DISCONTINUED | OUTPATIENT
Start: 2021-04-29 | End: 2021-04-29 | Stop reason: SURG

## 2021-04-29 RX ORDER — SODIUM CHLORIDE, SODIUM LACTATE, POTASSIUM CHLORIDE, CALCIUM CHLORIDE 600; 310; 30; 20 MG/100ML; MG/100ML; MG/100ML; MG/100ML
30 INJECTION, SOLUTION INTRAVENOUS CONTINUOUS PRN
Status: DISCONTINUED | OUTPATIENT
Start: 2021-04-29 | End: 2021-04-29 | Stop reason: HOSPADM

## 2021-04-29 RX ORDER — PROPOFOL 10 MG/ML
VIAL (ML) INTRAVENOUS AS NEEDED
Status: DISCONTINUED | OUTPATIENT
Start: 2021-04-29 | End: 2021-04-29 | Stop reason: SURG

## 2021-04-29 RX ADMIN — PROPOFOL 75 MG: 10 INJECTION, EMULSION INTRAVENOUS at 09:23

## 2021-04-29 RX ADMIN — SODIUM CHLORIDE, POTASSIUM CHLORIDE, SODIUM LACTATE AND CALCIUM CHLORIDE 30 ML/HR: 600; 310; 30; 20 INJECTION, SOLUTION INTRAVENOUS at 08:39

## 2021-04-29 RX ADMIN — PROPOFOL 125 MCG/KG/MIN: 10 INJECTION, EMULSION INTRAVENOUS at 09:23

## 2021-04-29 RX ADMIN — LIDOCAINE HYDROCHLORIDE 50 MG: 20 INJECTION, SOLUTION INFILTRATION; PERINEURAL at 09:21

## 2021-04-29 RX ADMIN — PROPOFOL 50 MG: 10 INJECTION, EMULSION INTRAVENOUS at 09:30

## 2021-04-29 RX ADMIN — PROPOFOL 50 MG: 10 INJECTION, EMULSION INTRAVENOUS at 09:37

## 2021-04-29 NOTE — ANESTHESIA POSTPROCEDURE EVALUATION
Patient: Thuy Blackwood    Procedure Summary     Date: 04/29/21 Room / Location:  [facility]    Anesthesia Start: 0918 Anesthesia Stop: 0957    Procedures:       COLONOSCOPY to cecum and TI with cold bx (N/A )      ESOPHAGOGASTRODUODENOSCOPY with cold bx (N/A Esophagus) Diagnosis:       Duodenal diverticulum      Functional diarrhea      (Duodenal diverticulum [K57.10])      (Functional diarrhea [K59.1])    Surgeons: Keke Yarbrough MD Provider: Kip Sesay MD    Anesthesia Type: MAC ASA Status: 2          Anesthesia Type: MAC    Vitals  Vitals Value Taken Time   /64 04/29/21 1023   Temp     Pulse 62 04/29/21 1023   Resp 16 04/29/21 1023   SpO2 95 % 04/29/21 1023           Post Anesthesia Care and Evaluation    Patient location during evaluation: PACU  Patient participation: complete - patient participated  Level of consciousness: awake  Pain score: 1  Pain management: adequate  Airway patency: patent  Anesthetic complications: No anesthetic complications  PONV Status: none  Cardiovascular status: acceptable  Respiratory status: acceptable  Hydration status: acceptable

## 2021-04-29 NOTE — ANESTHESIA PREPROCEDURE EVALUATION
Anesthesia Evaluation     Patient summary reviewed   history of anesthetic complications: PONV               Airway   No difficulty expected  Dental      Pulmonary    Cardiovascular     Rhythm: regular        Neuro/Psych  GI/Hepatic/Renal/Endo    (+)   renal disease,     Musculoskeletal     Abdominal    Substance History      OB/GYN          Other                        Anesthesia Plan    ASA 2     MAC       Anesthetic plan, all risks, benefits, and alternatives have been provided, discussed and informed consent has been obtained with: patient.

## 2021-05-03 ENCOUNTER — TELEPHONE (OUTPATIENT)
Dept: SURGERY | Facility: CLINIC | Age: 66
End: 2021-05-03

## 2021-05-03 DIAGNOSIS — K52.831 COLLAGENOUS COLITIS: Primary | ICD-10-CM

## 2021-05-03 LAB
CYTO UR: NORMAL
LAB AP CASE REPORT: NORMAL
LAB AP DIAGNOSIS COMMENT: NORMAL
PATH REPORT.ADDENDUM SPEC: NORMAL
PATH REPORT.FINAL DX SPEC: NORMAL
PATH REPORT.GROSS SPEC: NORMAL

## 2021-05-03 RX ORDER — BUDESONIDE 3 MG/1
9 CAPSULE, COATED PELLETS ORAL EVERY MORNING
Qty: 126 CAPSULE | Refills: 0 | Status: SHIPPED | OUTPATIENT
Start: 2021-05-03 | End: 2021-06-14

## 2021-05-03 NOTE — TELEPHONE ENCOUNTER
I called Neil and relayed the benign pathology findings from her EGD and colonoscopy biopsies.  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  I am also going to refer her to the gastroenterology service for long-term management of this, as many patients go into initial remission of their symptoms but develop recurrent symptoms later down the road that requires medical management with alternate therapies.  This does not usually fall in my wheelhouse of something that I treat.  Ale, can you make sure that the referral is sent over to the [facility] group?  Because she had no colon polyps, she will not require another screening colonoscopy for 10 years.  Marianne, can you update her health maintenance tab and recall pool?    Thanks,  NGA   how long she will be here

## 2021-05-04 ENCOUNTER — TELEPHONE (OUTPATIENT)
Dept: FAMILY MEDICINE CLINIC | Facility: CLINIC | Age: 66
End: 2021-05-04

## 2021-05-04 DIAGNOSIS — E78.5 HYPERLIPIDEMIA, UNSPECIFIED HYPERLIPIDEMIA TYPE: Primary | ICD-10-CM

## 2021-05-05 LAB
ALBUMIN SERPL-MCNC: 4.3 G/DL (ref 3.5–5.2)
ALBUMIN/GLOB SERPL: 1.8 G/DL
ALP SERPL-CCNC: 53 U/L (ref 39–117)
ALT SERPL-CCNC: 14 U/L (ref 1–33)
AST SERPL-CCNC: 18 U/L (ref 1–32)
BILIRUB SERPL-MCNC: 0.3 MG/DL (ref 0–1.2)
BUN SERPL-MCNC: 18 MG/DL (ref 8–23)
BUN/CREAT SERPL: 19.4 (ref 7–25)
CALCIUM SERPL-MCNC: 9.3 MG/DL (ref 8.6–10.5)
CHLORIDE SERPL-SCNC: 103 MMOL/L (ref 98–107)
CHOLEST SERPL-MCNC: 181 MG/DL (ref 0–200)
CO2 SERPL-SCNC: 25 MMOL/L (ref 22–29)
CREAT SERPL-MCNC: 0.93 MG/DL (ref 0.57–1)
GLOBULIN SER CALC-MCNC: 2.4 GM/DL
GLUCOSE SERPL-MCNC: 89 MG/DL (ref 65–99)
HDLC SERPL-MCNC: 58 MG/DL (ref 40–60)
LDLC SERPL CALC-MCNC: 107 MG/DL (ref 0–100)
POTASSIUM SERPL-SCNC: 4.7 MMOL/L (ref 3.5–5.2)
PROT SERPL-MCNC: 6.7 G/DL (ref 6–8.5)
SODIUM SERPL-SCNC: 138 MMOL/L (ref 136–145)
TRIGL SERPL-MCNC: 85 MG/DL (ref 0–150)
VLDLC SERPL CALC-MCNC: 16 MG/DL (ref 5–40)

## 2021-05-12 ENCOUNTER — OFFICE VISIT (OUTPATIENT)
Dept: FAMILY MEDICINE CLINIC | Facility: CLINIC | Age: 66
End: 2021-05-12

## 2021-05-12 VITALS
BODY MASS INDEX: 22.99 KG/M2 | RESPIRATION RATE: 16 BRPM | DIASTOLIC BLOOD PRESSURE: 62 MMHG | TEMPERATURE: 97.7 F | HEART RATE: 74 BPM | HEIGHT: 65 IN | WEIGHT: 138 LBS | OXYGEN SATURATION: 97 % | SYSTOLIC BLOOD PRESSURE: 130 MMHG

## 2021-05-12 DIAGNOSIS — N28.9 RENAL INSUFFICIENCY: ICD-10-CM

## 2021-05-12 DIAGNOSIS — R11.0 NAUSEA: Primary | ICD-10-CM

## 2021-05-12 PROCEDURE — 99213 OFFICE O/P EST LOW 20 MIN: CPT | Performed by: NURSE PRACTITIONER

## 2021-05-12 RX ORDER — PROMETHAZINE HYDROCHLORIDE 25 MG/1
25 TABLET ORAL EVERY 8 HOURS PRN
Qty: 30 TABLET | Refills: 5 | Status: SHIPPED | OUTPATIENT
Start: 2021-05-12 | End: 2021-11-11 | Stop reason: SDUPTHER

## 2021-05-12 RX ORDER — SODIUM BICARBONATE 650 MG/1
650 TABLET ORAL 2 TIMES DAILY
Qty: 60 TABLET | Refills: 5 | Status: SHIPPED | OUTPATIENT
Start: 2021-05-12 | End: 2021-11-11 | Stop reason: SDUPTHER

## 2021-05-12 NOTE — PROGRESS NOTES
"Subjective   Thuy Blackwood is a 65 y.o. female.     History of Present Illness    Since the last visit, she has overall felt well.  She has Hyperlipidemia and working on this with diet and exercise.  she has been compliant with current medications have reviewed them.  The patient denies medication side effects.  Will refill medications. /62   Pulse 74   Temp 97.7 °F (36.5 °C)   Resp 16   Ht 165.1 cm (65\")   Wt 62.6 kg (138 lb)   LMP  (LMP Unknown)   SpO2 97%   BMI 22.96 kg/m²     Results for orders placed or performed in visit on 05/04/21   Comprehensive metabolic panel    Specimen: Blood   Result Value Ref Range    Glucose 89 65 - 99 mg/dL    BUN 18 8 - 23 mg/dL    Creatinine 0.93 0.57 - 1.00 mg/dL    eGFR Non African Am 60 (L) >60 mL/min/1.73    eGFR African Am 73 >60 mL/min/1.73    BUN/Creatinine Ratio 19.4 7.0 - 25.0    Sodium 138 136 - 145 mmol/L    Potassium 4.7 3.5 - 5.2 mmol/L    Chloride 103 98 - 107 mmol/L    Total CO2 25.0 22.0 - 29.0 mmol/L    Calcium 9.3 8.6 - 10.5 mg/dL    Total Protein 6.7 6.0 - 8.5 g/dL    Albumin 4.30 3.50 - 5.20 g/dL    Globulin 2.4 gm/dL    A/G Ratio 1.8 g/dL    Total Bilirubin 0.3 0.0 - 1.2 mg/dL    Alkaline Phosphatase 53 39 - 117 U/L    AST (SGOT) 18 1 - 32 U/L    ALT (SGPT) 14 1 - 33 U/L   Lipid panel    Specimen: Blood   Result Value Ref Range    Total Cholesterol 181 0 - 200 mg/dL    Triglycerides 85 0 - 150 mg/dL    HDL Cholesterol 58 40 - 60 mg/dL    VLDL Cholesterol Will 16 5 - 40 mg/dL    LDL Chol Calc ([facility]) 107 (H) 0 - 100 mg/dL       Labs reviewed with pt today during visit. All questions answered.    Patient has nausea intermittently since her cholecystectomy and keeps promethazine to use. She reports using this 1-2 times per week.   The following portions of the patient's history were reviewed and updated as appropriate: allergies, current medications, past family history, past medical history, past social history, past surgical history and problem " list.    Review of Systems   Constitutional: Negative for unexpected weight change.   Respiratory: Negative for shortness of breath.    Cardiovascular: Negative for chest pain and palpitations.   Endocrine: Negative for cold intolerance, heat intolerance, polydipsia, polyphagia and polyuria.   Psychiatric/Behavioral: Negative for behavioral problems.       Objective   Physical Exam  Vitals and nursing note reviewed.   Constitutional:       Appearance: Normal appearance. She is well-developed.   Neck:      Vascular: No carotid bruit.   Cardiovascular:      Rate and Rhythm: Normal rate and regular rhythm.   Pulmonary:      Effort: Pulmonary effort is normal.      Breath sounds: Normal breath sounds.   Neurological:      Mental Status: She is alert and oriented to person, place, and time.   Psychiatric:         Mood and Affect: Mood normal.         Behavior: Behavior normal.         Thought Content: Thought content normal.         Judgment: Judgment normal.         Assessment/Plan   Diagnoses and all orders for this visit:    1. Nausea (Primary)  -     promethazine (PHENERGAN) 25 MG tablet; Take 1 tablet by mouth Every 8 (Eight) Hours As Needed for Nausea or Vomiting.  Dispense: 30 tablet; Refill: 5    2. Renal insufficiency  -     sodium bicarbonate 650 MG tablet; Take 1 tablet by mouth 2 (Two) Times a Day.  Dispense: 60 tablet; Refill: 5

## 2021-05-19 ENCOUNTER — APPOINTMENT (OUTPATIENT)
Dept: CT IMAGING | Facility: HOSPITAL | Age: 66
End: 2021-05-19

## 2021-05-19 ENCOUNTER — HOSPITAL ENCOUNTER (INPATIENT)
Facility: HOSPITAL | Age: 66
LOS: 2 days | Discharge: HOME | End: 2021-05-21
Attending: EMERGENCY MEDICINE | Admitting: HOSPITALIST

## 2021-05-19 DIAGNOSIS — N12 PYELONEPHRITIS: Primary | ICD-10-CM

## 2021-05-19 DIAGNOSIS — R11.2 NAUSEA AND VOMITING, INTRACTABILITY OF VOMITING NOT SPECIFIED, UNSPECIFIED VOMITING TYPE: ICD-10-CM

## 2021-05-19 DIAGNOSIS — R68.89 RIGORS: ICD-10-CM

## 2021-05-19 PROBLEM — K52.839 MICROSCOPIC COLITIS: Status: ACTIVE | Noted: 2021-05-19

## 2021-05-19 PROBLEM — A41.9 SEPSIS, UNSPECIFIED ORGANISM (HCC): Status: ACTIVE | Noted: 2021-05-19

## 2021-05-19 PROBLEM — R50.9 FEVER: Status: ACTIVE | Noted: 2021-05-19

## 2021-05-19 LAB
ALBUMIN SERPL-MCNC: 4 G/DL (ref 3.5–5.2)
ALBUMIN/GLOB SERPL: 1.4 G/DL
ALP SERPL-CCNC: 50 U/L (ref 39–117)
ALT SERPL W P-5'-P-CCNC: 11 U/L (ref 1–33)
ANION GAP SERPL CALCULATED.3IONS-SCNC: 11.6 MMOL/L (ref 5–15)
AST SERPL-CCNC: 16 U/L (ref 1–32)
BACTERIA UR QL AUTO: ABNORMAL /HPF
BASOPHILS # BLD AUTO: 0.02 10*3/MM3 (ref 0–0.2)
BASOPHILS NFR BLD AUTO: 0.2 % (ref 0–1.5)
BILIRUB SERPL-MCNC: 0.9 MG/DL (ref 0–1.2)
BILIRUB UR QL STRIP: NEGATIVE
BUN SERPL-MCNC: 15 MG/DL (ref 8–23)
BUN/CREAT SERPL: 17.6 (ref 7–25)
CALCIUM SPEC-SCNC: 9.1 MG/DL (ref 8.6–10.5)
CHLORIDE SERPL-SCNC: 100 MMOL/L (ref 98–107)
CLARITY UR: ABNORMAL
CO2 SERPL-SCNC: 23.4 MMOL/L (ref 22–29)
COLOR UR: YELLOW
CREAT SERPL-MCNC: 0.85 MG/DL (ref 0.57–1)
D-LACTATE SERPL-SCNC: 1.6 MMOL/L (ref 0.5–2)
DEPRECATED RDW RBC AUTO: 43.2 FL (ref 37–54)
EOSINOPHIL # BLD AUTO: 0.01 10*3/MM3 (ref 0–0.4)
EOSINOPHIL NFR BLD AUTO: 0.1 % (ref 0.3–6.2)
ERYTHROCYTE [DISTWIDTH] IN BLOOD BY AUTOMATED COUNT: 12.8 % (ref 12.3–15.4)
GFR SERPL CREATININE-BSD FRML MDRD: 67 ML/MIN/1.73
GLOBULIN UR ELPH-MCNC: 2.9 GM/DL
GLUCOSE SERPL-MCNC: 110 MG/DL (ref 65–99)
GLUCOSE UR STRIP-MCNC: NEGATIVE MG/DL
HCT VFR BLD AUTO: 36.5 % (ref 34–46.6)
HGB BLD-MCNC: 12.3 G/DL (ref 12–15.9)
HGB UR QL STRIP.AUTO: ABNORMAL
HOLD SPECIMEN: NORMAL
HOLD SPECIMEN: NORMAL
HYALINE CASTS UR QL AUTO: ABNORMAL /LPF
IMM GRANULOCYTES # BLD AUTO: 0.04 10*3/MM3 (ref 0–0.05)
IMM GRANULOCYTES NFR BLD AUTO: 0.4 % (ref 0–0.5)
KETONES UR QL STRIP: ABNORMAL
LEUKOCYTE ESTERASE UR QL STRIP.AUTO: ABNORMAL
LIPASE SERPL-CCNC: 26 U/L (ref 13–60)
LYMPHOCYTES # BLD AUTO: 0.67 10*3/MM3 (ref 0.7–3.1)
LYMPHOCYTES NFR BLD AUTO: 5.9 % (ref 19.6–45.3)
MCH RBC QN AUTO: 31.1 PG (ref 26.6–33)
MCHC RBC AUTO-ENTMCNC: 33.7 G/DL (ref 31.5–35.7)
MCV RBC AUTO: 92.4 FL (ref 79–97)
MONOCYTES # BLD AUTO: 1.08 10*3/MM3 (ref 0.1–0.9)
MONOCYTES NFR BLD AUTO: 9.5 % (ref 5–12)
NEUTROPHILS NFR BLD AUTO: 83.9 % (ref 42.7–76)
NEUTROPHILS NFR BLD AUTO: 9.5 10*3/MM3 (ref 1.7–7)
NITRITE UR QL STRIP: POSITIVE
NRBC BLD AUTO-RTO: 0 /100 WBC (ref 0–0.2)
PH UR STRIP.AUTO: 7.5 [PH] (ref 5–8)
PLATELET # BLD AUTO: 278 10*3/MM3 (ref 140–450)
PMV BLD AUTO: 9.6 FL (ref 6–12)
POTASSIUM SERPL-SCNC: 4.2 MMOL/L (ref 3.5–5.2)
PROT SERPL-MCNC: 6.9 G/DL (ref 6–8.5)
PROT UR QL STRIP: ABNORMAL
RBC # BLD AUTO: 3.95 10*6/MM3 (ref 3.77–5.28)
RBC # UR: ABNORMAL /HPF
REF LAB TEST METHOD: ABNORMAL
SODIUM SERPL-SCNC: 135 MMOL/L (ref 136–145)
SP GR UR STRIP: 1.01 (ref 1–1.03)
SQUAMOUS #/AREA URNS HPF: ABNORMAL /HPF
UROBILINOGEN UR QL STRIP: ABNORMAL
WBC # BLD AUTO: 11.32 10*3/MM3 (ref 3.4–10.8)
WBC UR QL AUTO: ABNORMAL /HPF
WHOLE BLOOD HOLD SPECIMEN: NORMAL
WHOLE BLOOD HOLD SPECIMEN: NORMAL

## 2021-05-19 PROCEDURE — 83605 ASSAY OF LACTIC ACID: CPT | Performed by: EMERGENCY MEDICINE

## 2021-05-19 PROCEDURE — 83690 ASSAY OF LIPASE: CPT

## 2021-05-19 PROCEDURE — 25010000002 IOPAMIDOL 61 % SOLUTION: Performed by: EMERGENCY MEDICINE

## 2021-05-19 PROCEDURE — 99284 EMERGENCY DEPT VISIT MOD MDM: CPT

## 2021-05-19 PROCEDURE — 74177 CT ABD & PELVIS W/CONTRAST: CPT

## 2021-05-19 PROCEDURE — 87186 SC STD MICRODIL/AGAR DIL: CPT | Performed by: HOSPITALIST

## 2021-05-19 PROCEDURE — 25010000002 CEFTRIAXONE PER 250 MG: Performed by: EMERGENCY MEDICINE

## 2021-05-19 PROCEDURE — 85025 COMPLETE CBC W/AUTO DIFF WBC: CPT

## 2021-05-19 PROCEDURE — 25010000002 ONDANSETRON PER 1 MG: Performed by: EMERGENCY MEDICINE

## 2021-05-19 PROCEDURE — 87186 SC STD MICRODIL/AGAR DIL: CPT | Performed by: EMERGENCY MEDICINE

## 2021-05-19 PROCEDURE — 80053 COMPREHEN METABOLIC PANEL: CPT

## 2021-05-19 PROCEDURE — 87088 URINE BACTERIA CULTURE: CPT | Performed by: HOSPITALIST

## 2021-05-19 PROCEDURE — 36415 COLL VENOUS BLD VENIPUNCTURE: CPT

## 2021-05-19 PROCEDURE — 81001 URINALYSIS AUTO W/SCOPE: CPT

## 2021-05-19 PROCEDURE — 25010000002 MORPHINE PER 10 MG: Performed by: EMERGENCY MEDICINE

## 2021-05-19 PROCEDURE — 87040 BLOOD CULTURE FOR BACTERIA: CPT | Performed by: EMERGENCY MEDICINE

## 2021-05-19 PROCEDURE — 87086 URINE CULTURE/COLONY COUNT: CPT | Performed by: HOSPITALIST

## 2021-05-19 PROCEDURE — U0004 COV-19 TEST NON-CDC HGH THRU: HCPCS | Performed by: EMERGENCY MEDICINE

## 2021-05-19 RX ORDER — MORPHINE SULFATE 2 MG/ML
2 INJECTION, SOLUTION INTRAMUSCULAR; INTRAVENOUS ONCE
Status: COMPLETED | OUTPATIENT
Start: 2021-05-19 | End: 2021-05-19

## 2021-05-19 RX ORDER — ONDANSETRON 2 MG/ML
4 INJECTION INTRAMUSCULAR; INTRAVENOUS ONCE
Status: COMPLETED | OUTPATIENT
Start: 2021-05-19 | End: 2021-05-19

## 2021-05-19 RX ORDER — ACETAMINOPHEN 500 MG
1000 TABLET ORAL ONCE
Status: COMPLETED | OUTPATIENT
Start: 2021-05-19 | End: 2021-05-19

## 2021-05-19 RX ORDER — CEFTRIAXONE SODIUM 1 G/50ML
1 INJECTION, SOLUTION INTRAVENOUS ONCE
Status: COMPLETED | OUTPATIENT
Start: 2021-05-19 | End: 2021-05-19

## 2021-05-19 RX ORDER — SODIUM CHLORIDE 0.9 % (FLUSH) 0.9 %
10 SYRINGE (ML) INJECTION AS NEEDED
Status: DISCONTINUED | OUTPATIENT
Start: 2021-05-19 | End: 2021-05-21 | Stop reason: HOSPADM

## 2021-05-19 RX ADMIN — SODIUM CHLORIDE, POTASSIUM CHLORIDE, SODIUM LACTATE AND CALCIUM CHLORIDE 1000 ML: 600; 310; 30; 20 INJECTION, SOLUTION INTRAVENOUS at 19:50

## 2021-05-19 RX ADMIN — ONDANSETRON 4 MG: 2 INJECTION INTRAMUSCULAR; INTRAVENOUS at 20:27

## 2021-05-19 RX ADMIN — ACETAMINOPHEN 1000 MG: 500 TABLET, FILM COATED ORAL at 22:25

## 2021-05-19 RX ADMIN — MORPHINE SULFATE 2 MG: 2 INJECTION, SOLUTION INTRAMUSCULAR; INTRAVENOUS at 20:27

## 2021-05-19 RX ADMIN — CEFTRIAXONE SODIUM 1 G: 1 INJECTION, SOLUTION INTRAVENOUS at 22:16

## 2021-05-19 RX ADMIN — ONDANSETRON 4 MG: 2 INJECTION INTRAMUSCULAR; INTRAVENOUS at 22:01

## 2021-05-19 RX ADMIN — IOPAMIDOL 85 ML: 612 INJECTION, SOLUTION INTRAVENOUS at 20:56

## 2021-05-20 ENCOUNTER — TELEPHONE (OUTPATIENT)
Dept: GASTROENTEROLOGY | Facility: CLINIC | Age: 66
End: 2021-05-20

## 2021-05-20 LAB
ALBUMIN SERPL-MCNC: 3.5 G/DL (ref 3.5–5.2)
ALBUMIN/GLOB SERPL: 1.3 G/DL
ALP SERPL-CCNC: 54 U/L (ref 39–117)
ALT SERPL W P-5'-P-CCNC: 12 U/L (ref 1–33)
ANION GAP SERPL CALCULATED.3IONS-SCNC: 11.7 MMOL/L (ref 5–15)
AST SERPL-CCNC: 16 U/L (ref 1–32)
BILIRUB SERPL-MCNC: 0.4 MG/DL (ref 0–1.2)
BUN SERPL-MCNC: 15 MG/DL (ref 8–23)
BUN/CREAT SERPL: 13.9 (ref 7–25)
CALCIUM SPEC-SCNC: 8.5 MG/DL (ref 8.6–10.5)
CHLORIDE SERPL-SCNC: 103 MMOL/L (ref 98–107)
CO2 SERPL-SCNC: 22.3 MMOL/L (ref 22–29)
CREAT SERPL-MCNC: 1.08 MG/DL (ref 0.57–1)
D-LACTATE SERPL-SCNC: 0.9 MMOL/L (ref 0.5–2)
DEPRECATED RDW RBC AUTO: 44.4 FL (ref 37–54)
ERYTHROCYTE [DISTWIDTH] IN BLOOD BY AUTOMATED COUNT: 12.9 % (ref 12.3–15.4)
GFR SERPL CREATININE-BSD FRML MDRD: 51 ML/MIN/1.73
GLOBULIN UR ELPH-MCNC: 2.6 GM/DL
GLUCOSE SERPL-MCNC: 105 MG/DL (ref 65–99)
HCT VFR BLD AUTO: 36 % (ref 34–46.6)
HGB BLD-MCNC: 12.1 G/DL (ref 12–15.9)
LYMPHOCYTES # BLD MANUAL: 0.74 10*3/MM3 (ref 0.7–3.1)
LYMPHOCYTES NFR BLD MANUAL: 4 % (ref 5–12)
LYMPHOCYTES NFR BLD MANUAL: 5.1 % (ref 19.6–45.3)
MCH RBC QN AUTO: 31.4 PG (ref 26.6–33)
MCHC RBC AUTO-ENTMCNC: 33.6 G/DL (ref 31.5–35.7)
MCV RBC AUTO: 93.5 FL (ref 79–97)
MONOCYTES # BLD AUTO: 0.58 10*3/MM3 (ref 0.1–0.9)
NEUTROPHILS # BLD AUTO: 13.19 10*3/MM3 (ref 1.7–7)
NEUTROPHILS NFR BLD MANUAL: 90.9 % (ref 42.7–76)
NRBC BLD AUTO-RTO: 0 /100 WBC (ref 0–0.2)
PLAT MORPH BLD: NORMAL
PLATELET # BLD AUTO: 236 10*3/MM3 (ref 140–450)
PMV BLD AUTO: 9.8 FL (ref 6–12)
POTASSIUM SERPL-SCNC: 3.6 MMOL/L (ref 3.5–5.2)
PROT SERPL-MCNC: 6.1 G/DL (ref 6–8.5)
RBC # BLD AUTO: 3.85 10*6/MM3 (ref 3.77–5.28)
RBC MORPH BLD: NORMAL
SARS-COV-2 ORF1AB RESP QL NAA+PROBE: NOT DETECTED
SODIUM SERPL-SCNC: 137 MMOL/L (ref 136–145)
WBC # BLD AUTO: 14.51 10*3/MM3 (ref 3.4–10.8)
WBC MORPH BLD: NORMAL

## 2021-05-20 PROCEDURE — 99222 1ST HOSP IP/OBS MODERATE 55: CPT | Performed by: INTERNAL MEDICINE

## 2021-05-20 PROCEDURE — 25010000002 CEFTRIAXONE PER 250 MG: Performed by: HOSPITALIST

## 2021-05-20 PROCEDURE — 85007 BL SMEAR W/DIFF WBC COUNT: CPT | Performed by: HOSPITALIST

## 2021-05-20 PROCEDURE — 25010000003 CEFTRIAXONE PER 250 MG: Performed by: INTERNAL MEDICINE

## 2021-05-20 PROCEDURE — 83605 ASSAY OF LACTIC ACID: CPT | Performed by: HOSPITALIST

## 2021-05-20 PROCEDURE — 85025 COMPLETE CBC W/AUTO DIFF WBC: CPT | Performed by: HOSPITALIST

## 2021-05-20 PROCEDURE — 80053 COMPREHEN METABOLIC PANEL: CPT | Performed by: HOSPITALIST

## 2021-05-20 PROCEDURE — 99223 1ST HOSP IP/OBS HIGH 75: CPT | Performed by: INTERNAL MEDICINE

## 2021-05-20 PROCEDURE — 87040 BLOOD CULTURE FOR BACTERIA: CPT | Performed by: INTERNAL MEDICINE

## 2021-05-20 RX ORDER — CEFTRIAXONE SODIUM 2 G/50ML
2 INJECTION, SOLUTION INTRAVENOUS EVERY 24 HOURS
Status: DISCONTINUED | OUTPATIENT
Start: 2021-05-21 | End: 2021-05-20

## 2021-05-20 RX ORDER — CEFTRIAXONE SODIUM 1 G/50ML
1 INJECTION, SOLUTION INTRAVENOUS ONCE
Status: COMPLETED | OUTPATIENT
Start: 2021-05-20 | End: 2021-05-20

## 2021-05-20 RX ORDER — CEFTRIAXONE SODIUM 2 G/50ML
2 INJECTION, SOLUTION INTRAVENOUS EVERY 24 HOURS
Status: DISCONTINUED | OUTPATIENT
Start: 2021-05-20 | End: 2021-05-21

## 2021-05-20 RX ORDER — ONDANSETRON 2 MG/ML
4 INJECTION INTRAMUSCULAR; INTRAVENOUS EVERY 6 HOURS PRN
Status: DISCONTINUED | OUTPATIENT
Start: 2021-05-20 | End: 2021-05-21 | Stop reason: HOSPADM

## 2021-05-20 RX ORDER — SODIUM CHLORIDE 9 MG/ML
125 INJECTION, SOLUTION INTRAVENOUS CONTINUOUS
Status: DISCONTINUED | OUTPATIENT
Start: 2021-05-20 | End: 2021-05-21 | Stop reason: HOSPADM

## 2021-05-20 RX ORDER — MORPHINE SULFATE 2 MG/ML
1 INJECTION, SOLUTION INTRAMUSCULAR; INTRAVENOUS EVERY 4 HOURS PRN
Status: DISCONTINUED | OUTPATIENT
Start: 2021-05-20 | End: 2021-05-21 | Stop reason: HOSPADM

## 2021-05-20 RX ORDER — PROMETHAZINE HYDROCHLORIDE 25 MG/1
25 SUPPOSITORY RECTAL ONCE
Status: COMPLETED | OUTPATIENT
Start: 2021-05-20 | End: 2021-05-20

## 2021-05-20 RX ORDER — SODIUM BICARBONATE 650 MG/1
650 TABLET ORAL 2 TIMES DAILY
Status: DISCONTINUED | OUTPATIENT
Start: 2021-05-20 | End: 2021-05-21 | Stop reason: HOSPADM

## 2021-05-20 RX ORDER — ACETAMINOPHEN 325 MG/1
650 TABLET ORAL EVERY 4 HOURS PRN
Status: DISCONTINUED | OUTPATIENT
Start: 2021-05-20 | End: 2021-05-21 | Stop reason: HOSPADM

## 2021-05-20 RX ORDER — MORPHINE SULFATE 2 MG/ML
1 INJECTION, SOLUTION INTRAMUSCULAR; INTRAVENOUS EVERY 4 HOURS PRN
Status: DISCONTINUED | OUTPATIENT
Start: 2021-05-20 | End: 2021-05-20

## 2021-05-20 RX ORDER — NALOXONE HCL 0.4 MG/ML
0.4 VIAL (ML) INJECTION
Status: DISCONTINUED | OUTPATIENT
Start: 2021-05-20 | End: 2021-05-20

## 2021-05-20 RX ORDER — CEFTRIAXONE SODIUM 2 G/50ML
2 INJECTION, SOLUTION INTRAVENOUS EVERY 24 HOURS
Status: DISCONTINUED | OUTPATIENT
Start: 2021-05-20 | End: 2021-05-20

## 2021-05-20 RX ORDER — NALOXONE HCL 0.4 MG/ML
0.4 VIAL (ML) INJECTION
Status: DISCONTINUED | OUTPATIENT
Start: 2021-05-20 | End: 2021-05-21 | Stop reason: HOSPADM

## 2021-05-20 RX ORDER — HYDROCODONE BITARTRATE AND ACETAMINOPHEN 7.5; 325 MG/1; MG/1
1 TABLET ORAL EVERY 4 HOURS PRN
Status: DISCONTINUED | OUTPATIENT
Start: 2021-05-20 | End: 2021-05-21 | Stop reason: HOSPADM

## 2021-05-20 RX ORDER — CHOLESTYRAMINE 4 G/9G
1 POWDER, FOR SUSPENSION ORAL DAILY
Status: DISCONTINUED | OUTPATIENT
Start: 2021-05-20 | End: 2021-05-21 | Stop reason: HOSPADM

## 2021-05-20 RX ORDER — BUDESONIDE 3 MG/1
9 CAPSULE, COATED PELLETS ORAL DAILY
Status: DISCONTINUED | OUTPATIENT
Start: 2021-05-20 | End: 2021-05-21 | Stop reason: HOSPADM

## 2021-05-20 RX ORDER — SODIUM CHLORIDE 0.9 % (FLUSH) 0.9 %
10 SYRINGE (ML) INJECTION EVERY 12 HOURS SCHEDULED
Status: DISCONTINUED | OUTPATIENT
Start: 2021-05-20 | End: 2021-05-21 | Stop reason: HOSPADM

## 2021-05-20 RX ORDER — BACLOFEN 10 MG/1
10 TABLET ORAL EVERY 12 HOURS PRN
Status: DISCONTINUED | OUTPATIENT
Start: 2021-05-20 | End: 2021-05-21 | Stop reason: HOSPADM

## 2021-05-20 RX ORDER — SODIUM CHLORIDE 0.9 % (FLUSH) 0.9 %
10 SYRINGE (ML) INJECTION AS NEEDED
Status: DISCONTINUED | OUTPATIENT
Start: 2021-05-20 | End: 2021-05-21 | Stop reason: HOSPADM

## 2021-05-20 RX ORDER — NITROGLYCERIN 0.4 MG/1
0.4 TABLET SUBLINGUAL
Status: DISCONTINUED | OUTPATIENT
Start: 2021-05-20 | End: 2021-05-21 | Stop reason: HOSPADM

## 2021-05-20 RX ADMIN — PROMETHAZINE HYDROCHLORIDE 25 MG: 25 SUPPOSITORY RECTAL at 01:38

## 2021-05-20 RX ADMIN — CHOLESTYRAMINE 1 PACKET: 4 POWDER, FOR SUSPENSION ORAL at 15:01

## 2021-05-20 RX ADMIN — SODIUM CHLORIDE, PRESERVATIVE FREE 10 ML: 5 INJECTION INTRAVENOUS at 21:15

## 2021-05-20 RX ADMIN — CEFTRIAXONE SODIUM 2 G: 2 INJECTION, SOLUTION INTRAVENOUS at 21:13

## 2021-05-20 RX ADMIN — SODIUM CHLORIDE, PRESERVATIVE FREE 10 ML: 5 INJECTION INTRAVENOUS at 09:49

## 2021-05-20 RX ADMIN — BUDESONIDE 9 MG: 3 CAPSULE, GELATIN COATED ORAL at 09:48

## 2021-05-20 RX ADMIN — SODIUM CHLORIDE 125 ML/HR: 9 INJECTION, SOLUTION INTRAVENOUS at 01:17

## 2021-05-20 RX ADMIN — SODIUM CHLORIDE, PRESERVATIVE FREE 10 ML: 5 INJECTION INTRAVENOUS at 01:24

## 2021-05-20 RX ADMIN — SODIUM BICARBONATE 650 MG: 650 TABLET ORAL at 09:40

## 2021-05-20 RX ADMIN — CEFTRIAXONE SODIUM 1 G: 1 INJECTION, SOLUTION INTRAVENOUS at 01:19

## 2021-05-20 RX ADMIN — SODIUM BICARBONATE 650 MG: 650 TABLET ORAL at 21:13

## 2021-05-20 NOTE — TELEPHONE ENCOUNTER
----- Message from Sherri Love MD sent at 5/20/2021 12:09 PM EDT -----  Regarding: f/u appt  And-could you please let this patient know that I have her scheduled for June 17 at 1145.  If this time does not work with her let me know and I will find another place to overbook her.  lb

## 2021-05-21 ENCOUNTER — READMISSION MANAGEMENT (OUTPATIENT)
Dept: CALL CENTER | Facility: HOSPITAL | Age: 66
End: 2021-05-21

## 2021-05-21 VITALS
DIASTOLIC BLOOD PRESSURE: 74 MMHG | HEART RATE: 82 BPM | SYSTOLIC BLOOD PRESSURE: 144 MMHG | TEMPERATURE: 98.3 F | HEIGHT: 65 IN | BODY MASS INDEX: 23.99 KG/M2 | OXYGEN SATURATION: 100 % | RESPIRATION RATE: 18 BRPM | WEIGHT: 143.96 LBS

## 2021-05-21 LAB
ANION GAP SERPL CALCULATED.3IONS-SCNC: 7.7 MMOL/L (ref 5–15)
BACTERIA SPEC AEROBE CULT: ABNORMAL
BASOPHILS # BLD AUTO: 0.02 10*3/MM3 (ref 0–0.2)
BASOPHILS NFR BLD AUTO: 0.2 % (ref 0–1.5)
BUN SERPL-MCNC: 12 MG/DL (ref 8–23)
BUN/CREAT SERPL: 17.1 (ref 7–25)
CALCIUM SPEC-SCNC: 7.3 MG/DL (ref 8.6–10.5)
CHLORIDE SERPL-SCNC: 111 MMOL/L (ref 98–107)
CO2 SERPL-SCNC: 22.3 MMOL/L (ref 22–29)
CREAT SERPL-MCNC: 0.7 MG/DL (ref 0.57–1)
DEPRECATED RDW RBC AUTO: 44.8 FL (ref 37–54)
EOSINOPHIL # BLD AUTO: 0.07 10*3/MM3 (ref 0–0.4)
EOSINOPHIL NFR BLD AUTO: 0.8 % (ref 0.3–6.2)
ERYTHROCYTE [DISTWIDTH] IN BLOOD BY AUTOMATED COUNT: 12.9 % (ref 12.3–15.4)
GFR SERPL CREATININE-BSD FRML MDRD: 83 ML/MIN/1.73
GLUCOSE SERPL-MCNC: 91 MG/DL (ref 65–99)
GRAM STN SPEC: ABNORMAL
HCT VFR BLD AUTO: 31.6 % (ref 34–46.6)
HGB BLD-MCNC: 10.5 G/DL (ref 12–15.9)
IMM GRANULOCYTES # BLD AUTO: 0.03 10*3/MM3 (ref 0–0.05)
IMM GRANULOCYTES NFR BLD AUTO: 0.3 % (ref 0–0.5)
LYMPHOCYTES # BLD AUTO: 0.83 10*3/MM3 (ref 0.7–3.1)
LYMPHOCYTES NFR BLD AUTO: 9.2 % (ref 19.6–45.3)
MAGNESIUM SERPL-MCNC: 2 MG/DL (ref 1.6–2.4)
MCH RBC QN AUTO: 31.3 PG (ref 26.6–33)
MCHC RBC AUTO-ENTMCNC: 33.2 G/DL (ref 31.5–35.7)
MCV RBC AUTO: 94.3 FL (ref 79–97)
MONOCYTES # BLD AUTO: 0.91 10*3/MM3 (ref 0.1–0.9)
MONOCYTES NFR BLD AUTO: 10.1 % (ref 5–12)
NEUTROPHILS NFR BLD AUTO: 7.13 10*3/MM3 (ref 1.7–7)
NEUTROPHILS NFR BLD AUTO: 79.4 % (ref 42.7–76)
NRBC BLD AUTO-RTO: 0 /100 WBC (ref 0–0.2)
PLATELET # BLD AUTO: 224 10*3/MM3 (ref 140–450)
PMV BLD AUTO: 9.9 FL (ref 6–12)
POTASSIUM SERPL-SCNC: 3.9 MMOL/L (ref 3.5–5.2)
RBC # BLD AUTO: 3.35 10*6/MM3 (ref 3.77–5.28)
SODIUM SERPL-SCNC: 141 MMOL/L (ref 136–145)
WBC # BLD AUTO: 8.99 10*3/MM3 (ref 3.4–10.8)

## 2021-05-21 PROCEDURE — 83735 ASSAY OF MAGNESIUM: CPT | Performed by: INTERNAL MEDICINE

## 2021-05-21 PROCEDURE — 85025 COMPLETE CBC W/AUTO DIFF WBC: CPT | Performed by: INTERNAL MEDICINE

## 2021-05-21 PROCEDURE — 83516 IMMUNOASSAY NONANTIBODY: CPT | Performed by: INTERNAL MEDICINE

## 2021-05-21 PROCEDURE — 99232 SBSQ HOSP IP/OBS MODERATE 35: CPT | Performed by: INTERNAL MEDICINE

## 2021-05-21 PROCEDURE — 80048 BASIC METABOLIC PNL TOTAL CA: CPT | Performed by: INTERNAL MEDICINE

## 2021-05-21 PROCEDURE — 86255 FLUORESCENT ANTIBODY SCREEN: CPT | Performed by: INTERNAL MEDICINE

## 2021-05-21 PROCEDURE — 82784 ASSAY IGA/IGD/IGG/IGM EACH: CPT | Performed by: INTERNAL MEDICINE

## 2021-05-21 PROCEDURE — 25010000002 ONDANSETRON PER 1 MG: Performed by: HOSPITALIST

## 2021-05-21 RX ORDER — SULFAMETHOXAZOLE AND TRIMETHOPRIM 800; 160 MG/1; MG/1
1 TABLET ORAL EVERY 12 HOURS SCHEDULED
Status: DISCONTINUED | OUTPATIENT
Start: 2021-05-21 | End: 2021-05-21 | Stop reason: HOSPADM

## 2021-05-21 RX ORDER — ONDANSETRON 4 MG/1
4 TABLET, FILM COATED ORAL EVERY 8 HOURS PRN
Qty: 30 TABLET | Refills: 0 | Status: SHIPPED | OUTPATIENT
Start: 2021-05-21 | End: 2022-11-17 | Stop reason: SDUPTHER

## 2021-05-21 RX ORDER — CHOLESTYRAMINE 4 G/9G
1 POWDER, FOR SUSPENSION ORAL DAILY
Qty: 30 EACH | Refills: 0 | Status: SHIPPED | OUTPATIENT
Start: 2021-05-22 | End: 2021-06-17

## 2021-05-21 RX ORDER — SULFAMETHOXAZOLE AND TRIMETHOPRIM 800; 160 MG/1; MG/1
1 TABLET ORAL EVERY 12 HOURS SCHEDULED
Qty: 14 TABLET | Refills: 0 | Status: SHIPPED | OUTPATIENT
Start: 2021-05-21 | End: 2021-05-28

## 2021-05-21 RX ADMIN — SODIUM BICARBONATE 650 MG: 650 TABLET ORAL at 09:13

## 2021-05-21 RX ADMIN — ONDANSETRON 4 MG: 2 INJECTION INTRAMUSCULAR; INTRAVENOUS at 06:10

## 2021-05-21 RX ADMIN — SODIUM CHLORIDE 125 ML/HR: 9 INJECTION, SOLUTION INTRAVENOUS at 06:12

## 2021-05-21 RX ADMIN — BUDESONIDE 9 MG: 3 CAPSULE, GELATIN COATED ORAL at 09:13

## 2021-05-21 RX ADMIN — CHOLESTYRAMINE 1 PACKET: 4 POWDER, FOR SUSPENSION ORAL at 09:13

## 2021-05-21 RX ADMIN — SODIUM CHLORIDE, PRESERVATIVE FREE 10 ML: 5 INJECTION INTRAVENOUS at 09:13

## 2021-05-24 ENCOUNTER — TRANSITIONAL CARE MANAGEMENT TELEPHONE ENCOUNTER (OUTPATIENT)
Dept: CALL CENTER | Facility: HOSPITAL | Age: 66
End: 2021-05-24

## 2021-05-24 LAB
ENDOMYSIUM IGA SER QL: NEGATIVE
GLIADIN PEPTIDE IGA SER-ACNC: 3 UNITS (ref 0–19)
GLIADIN PEPTIDE IGG SER-ACNC: 2 UNITS (ref 0–19)
IGA SERPL-MCNC: 180 MG/DL (ref 87–352)
TTG IGA SER-ACNC: <2 U/ML (ref 0–3)
TTG IGG SER-ACNC: <2 U/ML (ref 0–5)

## 2021-05-24 RX ORDER — BACLOFEN 10 MG/1
TABLET ORAL
Qty: 30 TABLET | Refills: 1 | Status: SHIPPED | OUTPATIENT
Start: 2021-05-24 | End: 2021-06-17

## 2021-05-24 NOTE — OUTREACH NOTE
Call Center TCM Note      Responses   [facility] patient discharged from?  [facility]   Does the patient have one of the following disease processes/diagnoses(primary or secondary)?  Sepsis   TCM attempt successful?  Yes   Call start time  1144   Call end time  1156   Discharge diagnosis  sepsis d/t pyelonephritis, colitis, rigors   Meds reviewed with patient/caregiver?  Yes   Is the patient having any side effects they believe may be caused by any medication additions or changes?  No   Does the patient have all medications related to this admission filled (includes all antibiotics, inhalers, nebulizers,steroids,etc.)  Yes   Is the patient taking all medications as directed (includes completed medication regime)?  Yes   Does the patient have a primary care provider?   Yes   Does the patient have an appointment with their PCP within 7 days of discharge?  No   What is preventing the patient from scheduling follow up appointments within 7 days of discharge?  Earlier appointment not available   Nursing Interventions  Educated patient on importance of making appointment, Advised patient to make appointment   Has the patient kept scheduled appointments due by today?  N/A   Has home health visited the patient within 72 hours of discharge?  N/A   Psychosocial issues?  No   Did the patient receive a copy of their discharge instructions?  Yes   Nursing interventions  Reviewed instructions with patient   What is the patient's perception of their health status since discharge?  Improving   Nursing interventions  Nurse provided patient education   Is the patient/caregiver able to teach back Sepsis?  S - Shivering,fever or very cold, E - Extreme pain or generalized discomfort (worst ever,especially abdomen), P - Pale or discolored skin, S - Sleepy, difficult to arouse,confused, S - Short of breath   Nursing interventions  Nurse provided patient education   Is patient/caregiver able to teach back steps to recovery at home?   Exercise as tolerated, Eat a balanced diet, Rest and regain strength   Is the patient/caregiver able to teach back signs and symptoms of worsening condition:  Fever, Rapid heart rate (>90), Shortness of breath/rapid respiratory rate   If the patient is a current smoker, are they able to teach back resources for cessation?  Not a smoker   Is the patient/caregiver able to teach back the hierarchy of who to call/visit for symptoms/problems? PCP, Specialist, Home health nurse, Urgent Care, ED, 911  Yes   TCM call completed?  Yes   Wrap up additional comments  Pt. states doing better but has not had a bowel movement since discharge. Pt. states she feels rectal pressure and passing gas. Advised pt. to follow up with PCP. Encouraged fluids and well balanced meals. Told pt. if worsening symptoms to return to ED.          Yahaira Dye RN    5/24/2021, 12:01 EDT

## 2021-05-25 ENCOUNTER — TELEPHONE (OUTPATIENT)
Dept: GASTROENTEROLOGY | Facility: CLINIC | Age: 66
End: 2021-05-25

## 2021-05-25 LAB
BACTERIA SPEC AEROBE CULT: NORMAL
BACTERIA SPEC AEROBE CULT: NORMAL

## 2021-05-25 NOTE — TELEPHONE ENCOUNTER
----- Message from Polina Llanes Rep sent at 5/25/2021  9:20 AM EDT -----  Regarding: Med question  Contact: [phone]  Pt has a question about a med she received in hospital

## 2021-05-25 NOTE — TELEPHONE ENCOUNTER
Call to pt.  States has been taking cholestyramine packets daily as instructed in hosp.  No BM since 5/19.  Has gas and today feels pressure that needs to go.     Advise will update DR Love for directions, and in the meantime - do not take cholestyramine.  Verb understanding.

## 2021-05-26 NOTE — TELEPHONE ENCOUNTER
"Call to pt.  Advise per DR Love note.  Verb understanding.     States has not had BM since 5/19.  Asking if she may take dulcolax or something to help her go. \"I need some relief\".     Question to DR Love.   "

## 2021-05-27 ENCOUNTER — TELEPHONE (OUTPATIENT)
Dept: FAMILY MEDICINE CLINIC | Facility: CLINIC | Age: 66
End: 2021-05-27

## 2021-05-27 NOTE — TELEPHONE ENCOUNTER
Caller: MercedThuy kate    Relationship: Self    Best call back number: [phone]     What orders are you requesting URINE SAMPLE    In what timeframe would the patient need to come in: TOMORROW MORNING AT 10AM    Where will you receive your lab/imaging services: [facility] AT OFFICE    Additional notes: PATIENT HAS AN APPT TOMORROW AT THE LAB SHE NEEDS ORDER SENT FOR URINE TEST TO CHECK FOR BLOOD IN URINE PER HER RECENT VISIT AT HOSPITAL. CAN YOU PLEASE CALL AND LET HER KNOW THIS IS IN BEFORE SHE GOES TO APPT.

## 2021-05-27 NOTE — TELEPHONE ENCOUNTER
"Call to pt.  Advise per DR Love note.  Verb understanding.     States has been so miserable and full that took 2 dulcolax tabs this am.  No response.  Now taking miralax.     States \"I'm just desperate - could I take a supp?\"    Update/question to DR Love.   "

## 2021-05-28 ENCOUNTER — OFFICE VISIT (OUTPATIENT)
Dept: FAMILY MEDICINE CLINIC | Facility: CLINIC | Age: 66
End: 2021-05-28

## 2021-05-28 VITALS
OXYGEN SATURATION: 96 % | HEART RATE: 78 BPM | HEIGHT: 65 IN | RESPIRATION RATE: 16 BRPM | TEMPERATURE: 97.1 F | DIASTOLIC BLOOD PRESSURE: 74 MMHG | WEIGHT: 144 LBS | SYSTOLIC BLOOD PRESSURE: 129 MMHG | BODY MASS INDEX: 23.99 KG/M2

## 2021-05-28 DIAGNOSIS — Z09 HOSPITAL DISCHARGE FOLLOW-UP: Primary | ICD-10-CM

## 2021-05-28 DIAGNOSIS — R82.90 UNSPECIFIED ABNORMAL FINDINGS IN URINE: ICD-10-CM

## 2021-05-28 DIAGNOSIS — Z87.448 HISTORY OF ACUTE PYELONEPHRITIS: ICD-10-CM

## 2021-05-28 LAB
BILIRUB BLD-MCNC: NEGATIVE MG/DL
CLARITY, POC: ABNORMAL
COLOR UR: ABNORMAL
GLUCOSE UR STRIP-MCNC: NEGATIVE MG/DL
KETONES UR QL: NEGATIVE
LEUKOCYTE EST, POC: NEGATIVE
NITRITE UR-MCNC: NEGATIVE MG/ML
PH UR: 6.5 [PH] (ref 5–8)
PROT UR STRIP-MCNC: ABNORMAL MG/DL
RBC # UR STRIP: NEGATIVE /UL
SP GR UR: 1.02 (ref 1–1.03)
UROBILINOGEN UR QL: NORMAL

## 2021-05-28 PROCEDURE — 81003 URINALYSIS AUTO W/O SCOPE: CPT | Performed by: FAMILY MEDICINE

## 2021-05-28 PROCEDURE — 99213 OFFICE O/P EST LOW 20 MIN: CPT | Performed by: FAMILY MEDICINE

## 2021-05-28 NOTE — PROGRESS NOTES
Subjective   Thuy Blackwood is a 65 y.o. female.     History of Present Illness     65-year-old female who is a new patient to me presents today for urinalysis recheck; she was admitted to the hospital April 19, 2021 for pyelonephritis.    Date of Admission: 5/19/2021  Date of Discharge:  5/21/2021  Primary Care Physician: Larry Allan MD      Discharge Diagnosis:        Active Hospital Problems     Diagnosis   POA   • **Pyelonephritis [N12]   Yes   • Microscopic colitis [K52.839]   Yes   • Nausea & vomiting [R11.2]   Yes   • Rigors [R68.89]   Yes   • Sepsis, unspecified organism (CMS/HCC) [A41.9]   Yes   • Fever [R50.9]   Yes   • Lower abdominal pain [R10.30]   Yes   • Personal history of malignant neoplasm of breast [Z85.3]   Not Applicable       Resolved Hospital Problems   No resolved problems to display.         Presenting Problem/History of Present Illness:  Rigors [R68.89]  Pyelonephritis [N12]  Nausea and vomiting, intractability of vomiting not specified, unspecified vomiting type [R11.2]                Very pleasant 66yo woman who presented to ER today c/o 2d h/o crampy lower abd pain radiating to back. Today developed N/V and rigors. No palliative or exacerbating factors. Denies LUTS, hematuria, or diarrhea. Has h/o microscopic colitis just diagnosed by Dr. Yarbrough after colonoscopy and EGD 4/29/21. Started Entocort 5/3/21. Current symptoms do not feel like her regular GI symptoms.     Hospital Course:  The patient is a 65 y.o. female who presented with E. coli pyelonephritis with resulting septicemia.  She was admitted and started on IV antibiotics while undergoing work-up.  CT did show evidence of pyelonephritis and urine culture first grew E. coli followed by 2 of 2 blood cultures.  Infectious disease was consulted.  Repeat blood cultures were drawn and results are pending however her sepsis has clinically resolved with normalization of fever and white blood cell count.  She is feeling well and  has been okayed to discharge by infectious disease with oral Bactrim to complete her antibiotic course.  If the repeat cultures are positive then she will need to return to the ER or call Dr. Reina.     She has collagenous colitis and was due for gastroenterology referral.  She was having some crampy abdominal pain and continued loose stool which had improved some on outpatient budesonide.  GI was consulted and had added cholestyramine.  Her nausea has been better controlled on Zofran as well.  Celiac serologies were sent and results are pending.  She is going to continue her current regimen of budesonide and cholestyramine and follow-up with GI in clinic.       Discharge Disposition:  Home or Self Care     Discharge Medications:           Discharge Medications            New Medications      Instructions Start Date   cholestyramine 4 g packet  Commonly known as: QUESTRAN    1 packet, Oral, Daily    Start Date: May 22, 2021      ondansetron 4 MG tablet  Commonly known as: Zofran    4 mg, Oral, Every 8 Hours PRN        sulfamethoxazole-trimethoprim 800-160 MG per tablet  Commonly known as: BACTRIM DS,SEPTRA DS    160 mg, Oral, Every 12 Hours Scheduled                      Changes to Medications      Instructions Start Date   baclofen 10 MG tablet  Commonly known as: LIORESAL  What changed:   · when to take this  · reasons to take this    TAKE 1 TABLET BY MOUTH EVERYDAY AT BEDTIME                      Continue These Medications      Instructions Start Date   Budesonide 3 MG 24 hr capsule  Commonly known as: Entocort EC    9 mg, Oral, Every Morning        cholecalciferol 25 MCG (1000 UT) tablet  Commonly known as: VITAMIN D3    1,000 Units, Oral, Every Other Day        CINNAMON PO    1,000 mg, Oral, 2 times daily        COCONUT OIL PO    1,000 mg, Oral, 2 times daily        MAGNESIUM-POTASSIUM PO    700 mg, Oral, Nightly        multivitamin tablet tablet  Commonly known as: THERAGRAN    1 tablet, Oral, Daily         promethazine 25 MG tablet  Commonly known as: PHENERGAN    25 mg, Oral, Every 8 Hours PRN        sodium bicarbonate 650 MG tablet    650 mg, Oral, 2 Times Daily            Presents today to ensure no blood in urine.  Urinalysis negative.  Patient is staying hydrated.  Communicating with GI office about recent diagnosis of collagenous colitis; has appointment with gastroenterology following up with them June 17, 2021.    The following portions of the patient's history were reviewed and updated as appropriate: allergies, current medications, past family history, past medical history, past social history, past surgical history and problem list.    Review of Systems   Constitutional: Negative for chills and fever.   HENT: Negative for congestion.    Respiratory: Negative for cough and shortness of breath.    Cardiovascular: Negative for chest pain, palpitations and leg swelling.   Gastrointestinal: Negative for abdominal pain, diarrhea and vomiting.       Objective   Physical Exam  Constitutional:       Appearance: She is well-developed.   HENT:      Head: Normocephalic and atraumatic.      Mouth/Throat:      Pharynx: Uvula midline.   Eyes:      Pupils: Pupils are equal, round, and reactive to light.   Cardiovascular:      Rate and Rhythm: Normal rate and regular rhythm.      Heart sounds: No murmur heard.     Pulmonary:      Effort: Pulmonary effort is normal. No respiratory distress.      Breath sounds: Normal breath sounds. No stridor. No wheezing or rales.   Skin:     General: Skin is warm.   Neurological:      Mental Status: She is alert.   Psychiatric:         Behavior: Behavior normal.           Lab Results   Component Value Date    COLORU Yellow 05/19/2021    CLARITYU Turbid (A) 05/19/2021    SPECGRAV 1.010 04/12/2016    PHUR 7.5 05/19/2021    LEUKOCYTESUR Large (3+) (A) 05/19/2021    NITRITE Negative 04/12/2016    PROTEINPOCUA Negative 04/12/2016    GLUCOSEUR Negative 04/12/2016    KETONESU 15 mg/dL (1+) (A)  05/19/2021    UROBILINOGEN 1.0 E.U./dL 05/19/2021    BILIRUBINUR Negative 05/19/2021    RBCUR Negative 04/12/2016         Assessment/Plan     Diagnoses and all orders for this visit:    1. Hospital discharge follow-up (Primary)  -     POC Urinalysis Dipstick  -     Urine Culture - Urine, Urine, Clean Catch    2. History of acute pyelonephritis  -     POC Urinalysis Dipstick  -     Urine Culture - Urine, Urine, Clean Catch    3. Unspecified abnormal findings in urine   -     Urine Culture - Urine, Urine, Clean Catch      Continue care per GI continue to stay hydrated.

## 2021-05-30 LAB
BACTERIA UR CULT: NORMAL
BACTERIA UR CULT: NORMAL

## 2021-06-02 ENCOUNTER — TELEPHONE (OUTPATIENT)
Dept: FAMILY MEDICINE CLINIC | Facility: CLINIC | Age: 66
End: 2021-06-02

## 2021-06-02 NOTE — TELEPHONE ENCOUNTER
----- Message from Phyllis Pete MD sent at 6/1/2021  9:54 AM EDT -----  Urine culture negative for UTI.

## 2021-06-08 DIAGNOSIS — Z85.3 PERSONAL HISTORY OF MALIGNANT NEOPLASM OF BREAST: Primary | ICD-10-CM

## 2021-06-14 RX ORDER — BUDESONIDE 3 MG/1
9 CAPSULE, COATED PELLETS ORAL EVERY MORNING
Qty: 126 CAPSULE | Refills: 0 | OUTPATIENT
Start: 2021-06-14 | End: 2021-07-26

## 2021-06-17 ENCOUNTER — OFFICE VISIT (OUTPATIENT)
Dept: GASTROENTEROLOGY | Facility: CLINIC | Age: 66
End: 2021-06-17

## 2021-06-17 VITALS — TEMPERATURE: 97.1 F | WEIGHT: 135 LBS | HEIGHT: 65 IN | BODY MASS INDEX: 22.49 KG/M2

## 2021-06-17 DIAGNOSIS — K52.839 MICROSCOPIC COLITIS, UNSPECIFIED MICROSCOPIC COLITIS TYPE: Primary | ICD-10-CM

## 2021-06-17 PROCEDURE — 99213 OFFICE O/P EST LOW 20 MIN: CPT | Performed by: INTERNAL MEDICINE

## 2021-06-17 RX ORDER — BACLOFEN 10 MG/1
TABLET ORAL
Qty: 30 TABLET | Refills: 1 | Status: SHIPPED | OUTPATIENT
Start: 2021-06-17 | End: 2021-07-13

## 2021-06-17 RX ORDER — BUDESONIDE 3 MG/1
CAPSULE, COATED PELLETS ORAL
Qty: 84 CAPSULE | Refills: 0 | Status: SHIPPED | OUTPATIENT
Start: 2021-06-17 | End: 2021-08-12

## 2021-06-17 NOTE — PROGRESS NOTES
Subjective   Chief Complaint   Patient presents with   • Nausea   • Vomiting   • Abdominal Pain   • ER Follow Up       Thuy Blackwood is a  65 y.o. female here for follow-up of microscopic colitis.    Patient recently underwent an outpatient colonoscopy with Dr. Yarbrough to follow-up and an enlarging duodenal diverticula as well as functional diarrhea that been present for several months prior to the procedure.  She had negative stool studies prior to that exam.  She underwent an EGD and colonoscopy on 4/29/2021.  Duodenal biopsy showed a nonspecific increase in intraepithelial lymphocytes.  Collagenous colitis was seen on random colon biopsies.  She was started on Entocort as an outpatient.  She did see improvement in the diarrhea within the first week.  Prior to that occasion she was having 20+ bowel movements a day with urgency and cramping.    She was hospitalized in late May for pyelonephritis.  She was started on cholestyramine for the diarrhea but then he came significantly constipated.  Celiac serology was drawn while she was in the hospital but this was negative.  She took 1 dose of a laxative and had improvement in the constipation and now she is back to her baseline essentially.  She occasionally has lower abdominal cramping.  She is having solid stool.  She is on 3 Entocort daily.      HPI  Past Medical History:   Diagnosis Date   • Acute sinusitis    • Anemia    • Anesthesia complication    • Arthritis    • Breast cancer (CMS/HCC)     Left   • Colon cancer screening 04/11/2019    Negative Cologuard   • H/O bone density study 08/2016    [facility]    • H/O Breast cancer     Left, 23 years ago.   • H/O complete eye exam 2014   • Hyperlipidemia    • Osteopenia    • Personal history of malignant neoplasm of breast    • PONV (postoperative nausea and vomiting)    • Renal insufficiency    • UTI (urinary tract infection)      Past Surgical History:   Procedure Laterality Date   • BLADDER SUSPENSION      • COLONOSCOPY N/A 4/29/2021    Procedure: COLONOSCOPY to cecum and TI with cold bx;  Surgeon: Keke Yarbrough MD;  Location: [facility] ENDOSCOPY;  Service: General;  Laterality: N/A;  pre - diarrhea, screening  post - internal hemorrhoids   • ENDOSCOPY N/A 4/29/2021    Procedure: ESOPHAGOGASTRODUODENOSCOPY with cold bx;  Surgeon: Keke Yarbrough MD;  Location: [facility] ENDOSCOPY;  Service: General;  Laterality: N/A;  pre - duodenal diverticulum, diarrhea  post - bile reflux. duodenum diverticuli, antrum gastritis   • ENDOSCOPY AND COLONOSCOPY  06/2010    Upper and lower GI endoscopies performed by Dr. Lawton showed gastritis but no other pathological diagnosis made.   • HAND SURGERY     • HYSTERECTOMY  1995    partial; ovaries in situ   • LAPAROSCOPIC CHOLECYSTECTOMY N/A 06/28/2010    Dr. Shawn Agarwal   • MAMMO BILATERAL  08/2016    [facility] does breast exams   • MASTECTOMY Left 1992   • PAP SMEAR  scheduled    p hyst . dr nora dozier   • TUBAL ABDOMINAL LIGATION  1986       Current Outpatient Medications:   •  baclofen (LIORESAL) 10 MG tablet, TAKE 1 TABLET BY MOUTH EVERYDAY AT BEDTIME, Disp: 30 tablet, Rfl: 1  •  cholecalciferol (VITAMIN D3) 1000 UNITS tablet, Take 1,000 Units by mouth Every Other Day., Disp: , Rfl:   •  CINNAMON PO, Take 1,000 mg by mouth 2 (two) times a day., Disp: , Rfl:   •  COCONUT OIL PO, Take 1,000 mg by mouth 2 (two) times a day., Disp: , Rfl:   •  fluocinolone (SYNALAR) 0.01 % external solution, , Disp: , Rfl:   •  MAGNESIUM-POTASSIUM PO, Take 700 mg by mouth Every Night., Disp: , Rfl:   •  Multiple Vitamin (MULTI-VITAMIN DAILY PO), Take 1 tablet by mouth Daily., Disp: , Rfl:   •  ondansetron (Zofran) 4 MG tablet, Take 1 tablet by mouth Every 8 (Eight) Hours As Needed for Nausea or Vomiting., Disp: 30 tablet, Rfl: 0  •  promethazine (PHENERGAN) 25 MG tablet, Take 1 tablet by mouth Every 8 (Eight) Hours As Needed for Nausea or Vomiting., Disp: 30 tablet, Rfl: 5  •  sodium  bicarbonate 650 MG tablet, Take 1 tablet by mouth 2 (Two) Times a Day., Disp: 60 tablet, Rfl: 5  •  sulfamethoxazole-trimethoprim (Bactrim DS) 800-160 MG per tablet, Take 1 tablet by mouth 2 (Two) Times a Day., Disp: 14 tablet, Rfl: 0  •  Budesonide (ENTOCORT EC) 3 MG 24 hr capsule, Take 2 capsules by mouth Every Morning for 28 days, THEN 1 capsule Every Morning for 28 days., Disp: 84 capsule, Rfl: 0  PRN Meds:.  Allergies   Allergen Reactions   • Doxycycline Hyclate Myalgia   • Levaquin [Levofloxacin] Unknown - Low Severity     Makes me very anxious, jittery     Social History     Socioeconomic History   • Marital status:      Spouse name: Not on file   • Number of children: Not on file   • Years of education: Not on file   • Highest education level: Not on file   Tobacco Use   • Smoking status: Never Smoker   • Smokeless tobacco: Never Used   Vaping Use   • Vaping Use: Never used   Substance and Sexual Activity   • Alcohol use: Yes     Comment: occasional   • Drug use: No   • Sexual activity: Defer     Family History   Problem Relation Age of Onset   • Depression Other    • Heart disease Other    • Hypertension Other    • Heart attack Other    • Rheum arthritis Other    • Malig Hyperthermia Neg Hx      Review of Systems   Constitutional: Positive for appetite change. Negative for unexpected weight change.   Gastrointestinal: Positive for abdominal pain. Negative for blood in stool and diarrhea.     Vitals:    06/17/21 1149   Temp: 97.1 °F (36.2 °C)         06/17/21  1149   Weight: 61.2 kg (135 lb)       Objective   Physical Exam  Constitutional:       Appearance: Normal appearance.   Abdominal:      General: There is no distension.      Palpations: Abdomen is soft.   Neurological:      Mental Status: She is alert.       No radiology results for the last 7 days    Assessment/Plan   Diagnoses and all orders for this visit:    Microscopic colitis, unspecified microscopic colitis type    Other orders  -      Budesonide (ENTOCORT EC) 3 MG 24 hr capsule; Take 2 capsules by mouth Every Morning for 28 days, THEN 1 capsule Every Morning for 28 days.      Plan:  · She is responded well to Entocort will now start tapering.  Have advised her regarding Entocort taper.  She has a little residual cramping which just may be the aftermath of microscopic colitis, recent infection and she has been on a new antibiotic for more recent UTI.  Can use OTC gas remedies as needed

## 2021-07-13 RX ORDER — BACLOFEN 10 MG/1
TABLET ORAL
Qty: 30 TABLET | Refills: 1 | Status: SHIPPED | OUTPATIENT
Start: 2021-07-13 | End: 2021-11-11 | Stop reason: SDUPTHER

## 2021-07-13 NOTE — TELEPHONE ENCOUNTER
Baclofen refill request rec electronically from [facility] in [employer].     I have routed the rx to Dr Dinh for signature. Once signed it will be escribed to [facility]

## 2021-07-23 DIAGNOSIS — Z85.3 PERSONAL HISTORY OF MALIGNANT NEOPLASM OF BREAST: Primary | ICD-10-CM

## 2021-08-11 RX ORDER — BACLOFEN 10 MG/1
TABLET ORAL
Qty: 30 TABLET | Refills: 1 | OUTPATIENT
Start: 2021-08-11

## 2021-08-11 NOTE — TELEPHONE ENCOUNTER
Baclofen refill request rec electronically from [facility] (inside [employer]). There is one refill on the rx that was sent on 7/13/2021. Request denied-refill too soon

## 2021-08-31 ENCOUNTER — APPOINTMENT (OUTPATIENT)
Dept: WOMENS IMAGING | Facility: HOSPITAL | Age: 66
End: 2021-08-31

## 2021-08-31 PROCEDURE — 77067 SCR MAMMO BI INCL CAD: CPT | Performed by: RADIOLOGY

## 2021-08-31 PROCEDURE — 77063 BREAST TOMOSYNTHESIS BI: CPT | Performed by: RADIOLOGY

## 2021-09-02 ENCOUNTER — TELEPHONE (OUTPATIENT)
Dept: ONCOLOGY | Facility: CLINIC | Age: 66
End: 2021-09-02

## 2021-09-16 ENCOUNTER — TELEPHONE (OUTPATIENT)
Dept: FAMILY MEDICINE CLINIC | Facility: CLINIC | Age: 66
End: 2021-09-16

## 2021-09-16 DIAGNOSIS — E78.5 HYPERLIPIDEMIA, UNSPECIFIED HYPERLIPIDEMIA TYPE: Primary | ICD-10-CM

## 2021-09-16 NOTE — TELEPHONE ENCOUNTER
Hub staff attempted to follow warm transfer process and was unsuccessful     Caller: Thuy Blackwood    Relationship to patient: Self    Best call back number: [phone] (M) OK TO LEAVE VM    Patient is needing: PATIENT CALLED TO FIND OUT IF SHE COULD SCHEDULE A LAB APPOINTMENT AHEAD OF HER APPOINTMENT WITH [facility] ON 11/11/2021 AT 10:45 BECAUSE SHE DOES NOT THINK SHE CAN FAST THAT LONG, PLEASE ADVISE ASAP    ALSO PATIENT HAS SEEN [facility] IN THE PAST WHEN DR JOHNSON HAS BEEN UNAVAILABLE, THIS IS JUST A FORMALITY FOR THE PATIENT TO GET ESTABLISHED WITH [facility] MARIA SINCE DR JOHNSON IS OFFBOARDING

## 2021-09-19 PROBLEM — R31.9 HEMATURIA: Status: ACTIVE | Noted: 2021-09-19

## 2021-09-21 NOTE — TELEPHONE ENCOUNTER
Caller: [facility]    Relationship: PATIENT    Best call back number: [phone]     What was the call regarding: [facility] WOULD LIKE TO CANCEL HER 03/30 TELEVISIT. DR HITCHCOCK CALLED HER YESTERDAY SO THEY ALREADY HAD A CHANCE TO SPEAK ABOUT WHAT WAS NEEDED    Do you require a callback: NO           Allergen Reactions    Actos [Pioglitazone] Nausea And Vomiting    Augmentin [Amoxicillin-Pot Clavulanate] Diarrhea    Other Itching     Dog, cat, pet dander    Ciprofloxacin      unsure    Sulfa Antibiotics Rash       Current Outpatient Medications on File Prior to Visit   Medication Sig Dispense Refill    HYDROcodone-acetaminophen (NORCO) 5-325 MG per tablet Take 1 tablet by mouth every 6 hours as needed for Pain for up to 3 days. Intended supply: 3 days. Take lowest dose possible to manage pain 12 tablet 0    simvastatin (ZOCOR) 20 MG tablet TAKE 1 TABLET BY MOUTH DAILY 90 tablet 1    clopidogrel (PLAVIX) 75 MG tablet TAKE 1 TABLET BY MOUTH DAILY 90 tablet 1    buPROPion (WELLBUTRIN SR) 200 MG extended release tablet Take 1 tablet by mouth 2 times daily TAKE 1 TABLET TWICE A DAY 60 tablet 1    chlordiazePOXIDE-clidinium (LIBRAX) 5-2.5 MG per capsule Take 1 capsule by mouth 2 times daily.  TAKE 1 CAPSULE TWICE A DAY AS NEEDED FOR PAIN 180 capsule 0    traZODone (DESYREL) 50 MG tablet TAKE 1 TABLET NIGHTLY 90 tablet 1    divalproex (DEPAKOTE ER) 250 MG extended release tablet TAKE 1 TABLET BY MOUTH DAILY      bisacodyl (DULCOLAX) 5 MG EC tablet Take 5 mg by mouth daily as needed for Constipation      calcium-cholecalciferol (OYSCO 500 + D) 500-200 MG-UNIT per tablet Take 1 tablet by mouth 2 times daily 360 tablet 1    amLODIPine (NORVASC) 5 MG tablet TAKE 1 TABLET BY MOUTH DAILY 90 tablet 3    simethicone (MI-ACID GAS RELIEF) 80 MG chewable tablet Take 80 mg by mouth 2 times daily      pantoprazole (PROTONIX) 40 MG tablet Take 1 tablet by mouth daily (Patient taking differently: Take 40 mg by mouth as needed ) 90 tablet 1    triamcinolone (NASACORT ALLERGY 24HR) 55 MCG/ACT nasal inhaler 2 sprays by Each Nostril route daily As needed      Polyethylene Glycol 3350 (MIRALAX PO) Take by mouth      cetirizine (ZYRTEC ALLERGY) 10 MG tablet Take 1 tablet by mouth daily 30 tablet 11    Tetrahydrozoline-Zn Sulfate (EYE DROPS ALLERGY RELIEF OP) Apply 1 drop to eye daily      Probiotic Product (PROBIOTIC-10) CHEW Take by mouth daily      linaclotide (LINZESS) 290 MCG CAPS capsule Take 290 mcg by mouth every morning (before breakfast)       MAGNESIUM CITRATE PO Take by mouth as needed (weekly)      denosumab (PROLIA) 60 MG/ML SOLN SC injection Inject 60 mg into the skin once      folic acid (FOLVITE) 769 MCG tablet Take 400 mcg by mouth daily      b complex vitamins capsule Take 1 capsule by mouth daily 100 capsule 3    FISH OIL 1,000 mg by Does not apply route 2 times daily Indications: Decreased Energy       aspirin 81 MG tablet Take 81 mg by mouth daily. No current facility-administered medications on file prior to visit. Family History   Problem Relation Age of Onset    Diabetes Mother     High Blood Pressure Mother     Heart Disease Mother     Heart Disease Father    [place] Sicard Parkinsonism Father     Neurofibromatosis Father     Depression Father     Alcohol Abuse Father     Neurofibromatosis Sister     Neurofibromatosis Brother     Other Brother         multiple sclerosis    Dementia Brother     Diabetes Sister     High Blood Pressure Sister     Depression Sister     Diabetes Brother        Social History     Tobacco Use    Smoking status: Never Smoker    Smokeless tobacco: Never Used   Vaping Use    Vaping Use: Never used   Substance Use Topics    Alcohol use: No     Alcohol/week: 0.0 standard drinks    Drug use: No       Pre-Op Testing completed and reviewed? Blood work within the last 30 days- yes  Chest radiograph- yes   Electrocardiogram- yes    Screening Questions  History of general anesthetic reaction? no  Able to do 4 MET's of activity without symptoms? yes  History of bleeding disorder or complications?   no    RCRI Risk Score  Class II risk d/t history of CVA    Subjective:       Review of Systems   Constitutional: Negative for chills, fatigue and fever.   HENT: Negative for congestion, ear pain, sinus pressure and sneezing. Eyes: Negative for pain, discharge, redness and itching. Respiratory: Negative for cough, shortness of breath and wheezing. Cardiovascular: Negative for chest pain, palpitations and leg swelling. Gastrointestinal: Negative for abdominal pain, blood in stool, constipation and diarrhea. Endocrine: Negative for polydipsia, polyphagia and polyuria. Genitourinary: Negative for difficulty urinating and hematuria. Musculoskeletal: Positive for arthralgias. Negative for back pain and neck pain. Skin: Negative for color change, pallor and rash. Allergic/Immunologic: Negative for environmental allergies and food allergies. Neurological: Negative for dizziness, light-headedness, numbness and headaches. Psychiatric/Behavioral: Negative for agitation and confusion. The patient is not nervous/anxious. Objective:       /78   Pulse 76   Resp 12   Ht 5' (1.524 m)   Wt 129 lb (58.5 kg)   BMI 25.19 kg/m²     Physical Exam  Vitals and nursing note reviewed. Constitutional:       Appearance: She is well-developed. HENT:      Head: Normocephalic and atraumatic. Right Ear: Hearing, tympanic membrane, ear canal and external ear normal.      Left Ear: Hearing, tympanic membrane, ear canal and external ear normal.      Nose:      Right Sinus: No maxillary sinus tenderness or frontal sinus tenderness. Left Sinus: No maxillary sinus tenderness or frontal sinus tenderness. Eyes:      General:         Right eye: No discharge. Left eye: No discharge. Conjunctiva/sclera: Conjunctivae normal.      Pupils: Pupils are equal, round, and reactive to light. Neck:      Vascular: No JVD. Cardiovascular:      Rate and Rhythm: Normal rate and regular rhythm. Heart sounds: Normal heart sounds. No murmur heard. Pulmonary:      Effort: Pulmonary effort is normal. No tachypnea.       Breath sounds: Normal breath sounds. No stridor. No wheezing. Abdominal:      General: There is no distension. Tenderness: There is no abdominal tenderness. Musculoskeletal:         General: No deformity. Cervical back: Normal range of motion. Comments: Right arm in arm sling   Lymphadenopathy:      Head:      Right side of head: No submental or submandibular adenopathy. Left side of head: No submental or submandibular adenopathy. Skin:     General: Skin is warm and dry. Capillary Refill: Capillary refill takes less than 2 seconds. Findings: No rash. Neurological:      Mental Status: She is alert and oriented to person, place, and time. Coordination: Coordination normal.   Psychiatric:         Mood and Affect: Mood normal.         Behavior: Behavior normal.         Thought Content:  Thought content normal.         Judgment: Judgment normal.         Immunization History   Administered Date(s) Administered    COVID-19, Moderna, PF, 100mcg/0.5mL 02/23/2021, 03/23/2021    Influenza, Quadv, 6 mo and older, IM (Fluzone, Flulaval) 10/02/2018    Influenza, Quadv, IM, (6 mo and older Fluzone, Flulaval, Fluarix and 3 yrs and older Afluria) 12/28/2016, 11/17/2017    Influenza, Powell Pulse, adjuvanted, 65 yrs +, IM, PF (Fluad) 10/20/2020    Influenza, Triv, inactivated, subunit, adjuvanted, IM (Fluad 65 yrs and older) 11/20/2019    Pneumococcal Conjugate 13-valent (Niels Na) 01/15/2019    Tdap (Boostrix, Adacel) 08/12/2018       Health Maintenance   Topic Date Due    Hepatitis C screen  Never done    Shingles Vaccine (1 of 2) Never done    Pneumococcal 65+ yrs at Risk Vaccine (2 of 2 - PPSV23) 01/15/2020    Breast cancer screen  01/17/2021    Flu vaccine (1) 09/01/2021    Lipid screen  01/21/2022    Annual Wellness Visit (AWV)  01/22/2022    Potassium monitoring  09/18/2022    Creatinine monitoring  09/18/2022    Colon cancer screen colonoscopy  05/11/2028    DTaP/Tdap/Td vaccine (2 - Td or Tdap) 08/12/2028    DEXA (modify frequency per FRAX score)  Completed    COVID-19 Vaccine  Completed    Hepatitis A vaccine  Aged Out    Hepatitis B vaccine  Aged Out    Hib vaccine  Aged Out    Meningococcal (ACWY) vaccine  Aged Out         Assessment/Plan:        Laura Christian was seen today for pre-op exam.    Diagnoses and all orders for this visit:    Pre-operative examination  - After reviewing pre-op workup, history, and physical, She is noted to be an acceptable risk for proposed surgery  - Note to Dr. Evelin Morton  - Electronically signed by CARLOS MANUEL Cotter CNP on 9/21/2021 at 10:53 AM         CARLOS MANUEL Cotter CNP

## 2021-11-10 LAB
ALBUMIN SERPL-MCNC: 4.2 G/DL (ref 3.8–4.8)
ALBUMIN/GLOB SERPL: 1.8 {RATIO} (ref 1.2–2.2)
ALP SERPL-CCNC: 51 IU/L (ref 44–121)
ALT SERPL-CCNC: 15 IU/L (ref 0–32)
AST SERPL-CCNC: 21 IU/L (ref 0–40)
BASOPHILS # BLD AUTO: 0 X10E3/UL (ref 0–0.2)
BASOPHILS NFR BLD AUTO: 1 %
BILIRUB SERPL-MCNC: 0.5 MG/DL (ref 0–1.2)
BUN SERPL-MCNC: 16 MG/DL (ref 8–27)
BUN/CREAT SERPL: 19 (ref 12–28)
CALCIUM SERPL-MCNC: 9.4 MG/DL (ref 8.7–10.3)
CHLORIDE SERPL-SCNC: 102 MMOL/L (ref 96–106)
CHOLEST SERPL-MCNC: 203 MG/DL (ref 100–199)
CO2 SERPL-SCNC: 24 MMOL/L (ref 20–29)
CREAT SERPL-MCNC: 0.86 MG/DL (ref 0.57–1)
EOSINOPHIL # BLD AUTO: 0.1 X10E3/UL (ref 0–0.4)
EOSINOPHIL NFR BLD AUTO: 3 %
ERYTHROCYTE [DISTWIDTH] IN BLOOD BY AUTOMATED COUNT: 12.7 % (ref 11.7–15.4)
GLOBULIN SER CALC-MCNC: 2.4 G/DL (ref 1.5–4.5)
GLUCOSE SERPL-MCNC: 82 MG/DL (ref 65–99)
HCT VFR BLD AUTO: 38.9 % (ref 34–46.6)
HDLC SERPL-MCNC: 59 MG/DL
HGB BLD-MCNC: 12.8 G/DL (ref 11.1–15.9)
IMM GRANULOCYTES # BLD AUTO: 0 X10E3/UL (ref 0–0.1)
IMM GRANULOCYTES NFR BLD AUTO: 0 %
LDLC SERPL CALC-MCNC: 128 MG/DL (ref 0–99)
LYMPHOCYTES # BLD AUTO: 1 X10E3/UL (ref 0.7–3.1)
LYMPHOCYTES NFR BLD AUTO: 25 %
MCH RBC QN AUTO: 31 PG (ref 26.6–33)
MCHC RBC AUTO-ENTMCNC: 32.9 G/DL (ref 31.5–35.7)
MCV RBC AUTO: 94 FL (ref 79–97)
MONOCYTES # BLD AUTO: 0.5 X10E3/UL (ref 0.1–0.9)
MONOCYTES NFR BLD AUTO: 11 %
NEUTROPHILS # BLD AUTO: 2.4 X10E3/UL (ref 1.4–7)
NEUTROPHILS NFR BLD AUTO: 60 %
PLATELET # BLD AUTO: 340 X10E3/UL (ref 150–450)
POTASSIUM SERPL-SCNC: 4.8 MMOL/L (ref 3.5–5.2)
PROT SERPL-MCNC: 6.6 G/DL (ref 6–8.5)
RBC # BLD AUTO: 4.13 X10E6/UL (ref 3.77–5.28)
SODIUM SERPL-SCNC: 139 MMOL/L (ref 134–144)
TRIGL SERPL-MCNC: 91 MG/DL (ref 0–149)
TSH SERPL DL<=0.005 MIU/L-ACNC: 2.28 UIU/ML (ref 0.45–4.5)
VLDLC SERPL CALC-MCNC: 16 MG/DL (ref 5–40)
WBC # BLD AUTO: 4 X10E3/UL (ref 3.4–10.8)

## 2021-11-11 ENCOUNTER — OFFICE VISIT (OUTPATIENT)
Dept: FAMILY MEDICINE CLINIC | Facility: CLINIC | Age: 66
End: 2021-11-11

## 2021-11-11 VITALS
WEIGHT: 138 LBS | HEART RATE: 83 BPM | HEIGHT: 65 IN | BODY MASS INDEX: 22.99 KG/M2 | OXYGEN SATURATION: 99 % | TEMPERATURE: 97.3 F | SYSTOLIC BLOOD PRESSURE: 130 MMHG | RESPIRATION RATE: 16 BRPM | DIASTOLIC BLOOD PRESSURE: 62 MMHG

## 2021-11-11 DIAGNOSIS — Z00.00 MEDICARE ANNUAL WELLNESS VISIT, SUBSEQUENT: Primary | ICD-10-CM

## 2021-11-11 DIAGNOSIS — N28.9 RENAL INSUFFICIENCY: ICD-10-CM

## 2021-11-11 DIAGNOSIS — R11.0 NAUSEA: ICD-10-CM

## 2021-11-11 PROCEDURE — 1160F RVW MEDS BY RX/DR IN RCRD: CPT | Performed by: NURSE PRACTITIONER

## 2021-11-11 PROCEDURE — 99214 OFFICE O/P EST MOD 30 MIN: CPT | Performed by: NURSE PRACTITIONER

## 2021-11-11 PROCEDURE — G0009 ADMIN PNEUMOCOCCAL VACCINE: HCPCS | Performed by: NURSE PRACTITIONER

## 2021-11-11 PROCEDURE — 96160 PT-FOCUSED HLTH RISK ASSMT: CPT | Performed by: NURSE PRACTITIONER

## 2021-11-11 PROCEDURE — G0439 PPPS, SUBSEQ VISIT: HCPCS | Performed by: NURSE PRACTITIONER

## 2021-11-11 PROCEDURE — 90732 PPSV23 VACC 2 YRS+ SUBQ/IM: CPT | Performed by: NURSE PRACTITIONER

## 2021-11-11 PROCEDURE — 1170F FXNL STATUS ASSESSED: CPT | Performed by: NURSE PRACTITIONER

## 2021-11-11 RX ORDER — A/SINGAPORE/GP1908/2015 IVR-180 (AN A/MICHIGAN/45/2015 (H1N1)PDM09-LIKE VIRUS, A/HONG KONG/4801/2014, NYMC X-263B (H3N2) (AN A/HONG KONG/4801/2014-LIKE VIRUS), AND B/BRISBANE/60/2008, WILD TYPE (A B/BRISBANE/60/2008-LIKE VIRUS) 15; 15; 15 UG/.5ML; UG/.5ML; UG/.5ML
INJECTION, SUSPENSION INTRAMUSCULAR
COMMUNITY
Start: 2021-10-08 | End: 2022-06-06

## 2021-11-11 RX ORDER — PROMETHAZINE HYDROCHLORIDE 25 MG/1
25 TABLET ORAL EVERY 8 HOURS PRN
Qty: 30 TABLET | Refills: 5 | Status: SHIPPED | OUTPATIENT
Start: 2021-11-11 | End: 2022-11-17 | Stop reason: SDUPTHER

## 2021-11-11 RX ORDER — BACLOFEN 10 MG/1
10 TABLET ORAL
Qty: 30 TABLET | Refills: 5 | Status: SHIPPED | OUTPATIENT
Start: 2021-11-11 | End: 2022-05-13

## 2021-11-11 RX ORDER — SODIUM BICARBONATE 650 MG/1
650 TABLET ORAL 2 TIMES DAILY
Qty: 60 TABLET | Refills: 5 | Status: SHIPPED | OUTPATIENT
Start: 2021-11-11 | End: 2022-11-17 | Stop reason: SDUPTHER

## 2021-11-11 RX ORDER — ZINC SULFATE 50(220)MG
220 CAPSULE ORAL DAILY
COMMUNITY

## 2021-11-11 NOTE — PROGRESS NOTES
Immunization  Immunization performed in **rd* by Denita Ahuja MA. Patient tolerated the procedure well without complications.  11/11/21   Denita Ahuja MA

## 2021-11-11 NOTE — PROGRESS NOTES
The ABCs of the Annual Wellness Visit  Subsequent Medicare Wellness Visit    Chief Complaint   Patient presents with   • requesting labs   • Elbow Pain     L side x 1 month      Subjective    History of Present Illness:  Thuy Blackwood is a 66 y.o. female who presents for a Subsequent Medicare Wellness Visit.    The following portions of the patient's history were reviewed and   updated as appropriate: allergies, current medications, past family history, past medical history, past social history, past surgical history and problem list.    Compared to one year ago, the patient feels her physical   health is the same.    Compared to one year ago, the patient feels her mental   health is the same.    Recent Hospitalizations:  This patient has had a [facility] admission record on file within the last 365 days.    Current Medical Providers:  Patient Care Team:  Cecilia Torres APRN as PCP - General (Family Medicine)  Dereck Dinh MD as Consulting Physician (Hematology and Oncology)  Larry Allan MD as Referring Physician (Family Medicine)    Outpatient Medications Prior to Visit   Medication Sig Dispense Refill   • baclofen (LIORESAL) 10 MG tablet TAKE 1 TABLET BY MOUTH EVERYDAY AT BEDTIME 30 tablet 1   • cholecalciferol (VITAMIN D3) 1000 UNITS tablet Take 1,000 Units by mouth Every Other Day.     • CINNAMON PO Take 1,000 mg by mouth 2 (two) times a day.     • COCONUT OIL PO Take 1,000 mg by mouth 2 (two) times a day.     • MAGNESIUM-POTASSIUM PO Take 700 mg by mouth Every Night.     • Multiple Vitamin (MULTI-VITAMIN DAILY PO) Take 1 tablet by mouth Daily.     • ondansetron (Zofran) 4 MG tablet Take 1 tablet by mouth Every 8 (Eight) Hours As Needed for Nausea or Vomiting. 30 tablet 0   • promethazine (PHENERGAN) 25 MG tablet Take 1 tablet by mouth Every 8 (Eight) Hours As Needed for Nausea or Vomiting. 30 tablet 5   • sodium bicarbonate 650 MG tablet Take 1 tablet by mouth 2 (Two) Times a Day.  "60 tablet 5   • zinc sulfate (ZINCATE) 220 (50 Zn) MG capsule Take 220 mg by mouth Daily.     • Fluad Quadrivalent 0.5 ML prefilled syringe injection      • fluocinolone (SYNALAR) 0.01 % external solution        No facility-administered medications prior to visit.       No opioid medication identified on active medication list. I have reviewed chart for other potential  high risk medication/s and harmful drug interactions in the elderly.          Aspirin is not on active medication list.  Aspirin use is indicated based on review of current medical condition/s. Pros and cons of this therapy have been discussed with this patient. Benefits of this medication outweigh potential harm.  Patient has been instructed to start taking this medication..    Patient Active Problem List   Diagnosis   • Hyperlipidemia   • Osteopenia of multiple sites   • Personal history of malignant neoplasm of breast   • Urinary tract infection with hematuria   • Lower abdominal pain   • Diarrhea, unspecified   • Duodenal diverticulum   • Functional diarrhea   • Internal hemorrhoids   • Acute superficial gastritis without hemorrhage   • Pyelonephritis   • Microscopic colitis   • Nausea & vomiting   • Rigors   • Sepsis, unspecified organism (HCC)   • Fever   • Hematuria     Advance Care Planning  Advance Directive is not on file.  ACP discussion was held with the patient during this visit. Patient has an advance directive (not in EMR), copy requested.          Objective    Vitals:    11/11/21 1101   BP: 130/62   Pulse: 83   Resp: 16   Temp: 97.3 °F (36.3 °C)   SpO2: 99%   Weight: 62.6 kg (138 lb)   Height: 165.1 cm (65\")     BMI Readings from Last 1 Encounters:   11/11/21 22.96 kg/m²   BMI is within normal parameters. No follow-up required.    Does the patient have evidence of cognitive impairment? No    Physical Exam  Lab Results   Component Value Date    CHLPL 203 (H) 11/09/2021    TRIG 91 11/09/2021    HDL 59 11/09/2021     (H) " 11/09/2021    VLDL 16 11/09/2021            HEALTH RISK ASSESSMENT    Smoking Status:  Social History     Tobacco Use   Smoking Status Never Smoker   Smokeless Tobacco Never Used     Alcohol Consumption:  Social History     Substance and Sexual Activity   Alcohol Use Yes    Comment: occasional     Fall Risk Screen:    KYLE Fall Risk Assessment was completed, and patient is at LOW risk for falls.Assessment completed on:11/11/2021    Depression Screening:  PHQ-2/PHQ-9 Depression Screening 11/11/2021   Little interest or pleasure in doing things 0   Feeling down, depressed, or hopeless 0   Trouble falling or staying asleep, or sleeping too much 0   Feeling tired or having little energy 0   Poor appetite or overeating 0   Feeling bad about yourself - or that you are a failure or have let yourself or your family down 0   Trouble concentrating on things, such as reading the newspaper or watching television 0   Moving or speaking so slowly that other people could have noticed. Or the opposite - being so fidgety or restless that you have been moving around a lot more than usual 0   Thoughts that you would be better off dead, or of hurting yourself in some way 0   Total Score 0   If you checked off any problems, how difficult have these problems made it for you to do your work, take care of things at home, or get along with other people? -       Health Habits and Functional and Cognitive Screening:  Functional & Cognitive Status 11/11/2021   Do you have difficulty preparing food and eating? No   Do you have difficulty bathing yourself, getting dressed or grooming yourself? No   Do you have difficulty using the toilet? No   Do you have difficulty moving around from place to place? No   Do you have trouble with steps or getting out of a bed or a chair? No   Current Diet Other   Dental Exam Up to date   Eye Exam Up to date   Exercise (times per week) 0 times per week   Current Exercises Include No Regular Exercise   Current  Exercise Activities Include -   Do you need help using the phone?  No   Are you deaf or do you have serious difficulty hearing?  No   Do you need help with transportation? No   Do you need help shopping? No   Do you need help preparing meals?  No   Do you need help with housework?  No   Do you need help with laundry? No   Do you need help taking your medications? No   Do you need help managing money? No   Do you ever drive or ride in a car without wearing a seat belt? No   Have you felt unusual stress, anger or loneliness in the last month? No   Who do you live with? Spouse   If you need help, do you have trouble finding someone available to you? No   Have you been bothered in the last four weeks by sexual problems? No   Do you have difficulty concentrating, remembering or making decisions? No       Age-appropriate Screening Schedule:  Refer to the list below for future screening recommendations based on patient's age, sex and/or medical conditions. Orders for these recommended tests are listed in the plan section. The patient has been provided with a written plan.    Health Maintenance   Topic Date Due   • DXA SCAN  07/30/2022   • MAMMOGRAM  08/31/2022   • LIPID PANEL  11/09/2022   • TDAP/TD VACCINES (2 - Td or Tdap) 05/09/2027   • INFLUENZA VACCINE  Completed   • ZOSTER VACCINE  Addressed   • PAP SMEAR  Discontinued              Assessment/Plan   CMS Preventative Services Quick Reference  Risk Factors Identified During Encounter  Immunizations Discussed/Encouraged (specific Immunizations; Influenza and Pneumococcal 23  The above risks/problems have been discussed with the patient.  Follow up actions/plans if indicated are seen below in the Assessment/Plan Section.  Pertinent information has been shared with the patient in the After Visit Summary.    There are no diagnoses linked to this encounter.    Follow Up:   Return in about 1 year (around 11/11/2022) for Next scheduled follow up.     An After Visit Summary  and PPPS were made available to the patient.        I spent 20 minutes caring for Thuy on this date of service. This time includes time spent by me in the following activities:preparing for the visit, reviewing tests, ordering medications, tests, or procedures and documenting information in the medical record

## 2021-11-11 NOTE — PATIENT INSTRUCTIONS
Medicare Wellness  Personal Prevention Plan of Service     Date of Office Visit:  2021  Encounter Provider:  JESÚS Rodriguez  Place of Service:  [facility]  Patient Name: Thuy Blackwood  :  1955    As part of the Medicare Wellness portion of your visit today, we are providing you with this personalized preventive plan of services (PPPS). This plan is based upon recommendations of the [place] Preventive Services Task Force (USPSTF) and the Advisory Committee on Immunization Practices (ACIP).    This lists the preventive care services that should be considered, and provides dates of when you are due. Items listed as completed are up-to-date and do not require any further intervention.    Health Maintenance   Topic Date Due   • Pneumococcal Vaccine 65+ (2 of 2 - PPSV23) 10/31/2021   • DXA SCAN  2022   • MAMMOGRAM  2022   • LIPID PANEL  2022   • ANNUAL WELLNESS VISIT  2022   • TDAP/TD VACCINES (2 - Td or Tdap) 2027   • COLORECTAL CANCER SCREENING  2031   • COVID-19 Vaccine  Completed   • INFLUENZA VACCINE  Completed   • ZOSTER VACCINE  Addressed   • HEPATITIS C SCREENING  Discontinued   • PAP SMEAR  Discontinued       Orders Placed This Encounter   Procedures   • Pneumococcal Polysaccharide Vaccine 23-Valent (PPSV23) Greater Than or Equal To 1yo Subcutaneous / IM       Return in about 1 year (around 2022) for Next scheduled follow up.

## 2021-11-11 NOTE — PROGRESS NOTES
Subjective   Thuy Blackwood is a 66 y.o. female.     History of Present Illness   Patient complains of left elbow pain. The symptoms began about a month ago.  Pain is a result of no known event. Pain is located lateral region. Discomfort is described as aching and soreness. Symptoms are exacerbated by pressure applied to area and lifting.  Evaluation to date: none. Therapy to date includes: nothing specific    Labs reviewed with pt today during visit. All questions answered.    The following portions of the patient's history were reviewed and updated as appropriate: allergies, current medications, past family history, past medical history, past social history, past surgical history and problem list.    Review of Systems   Constitutional: Negative for unexpected weight change.   Respiratory: Negative for shortness of breath.    Cardiovascular: Negative for chest pain and palpitations.   Endocrine: Negative for cold intolerance, heat intolerance, polydipsia, polyphagia and polyuria.   Musculoskeletal: Positive for arthralgias. Negative for joint swelling.   Psychiatric/Behavioral: Negative for behavioral problems.       Objective   Physical Exam  Vitals and nursing note reviewed.   Constitutional:       Appearance: Normal appearance. She is well-developed.   Neck:      Vascular: No carotid bruit.   Cardiovascular:      Rate and Rhythm: Normal rate and regular rhythm.   Pulmonary:      Effort: Pulmonary effort is normal.      Breath sounds: Normal breath sounds.   Musculoskeletal:      Left elbow: No swelling, deformity, effusion or lacerations. Normal range of motion. Tenderness present.        Arms:    Neurological:      Mental Status: She is alert and oriented to person, place, and time.   Psychiatric:         Mood and Affect: Mood normal.         Behavior: Behavior normal.         Thought Content: Thought content normal.         Judgment: Judgment normal.         Assessment/Plan   Diagnoses and all orders for  this visit:    1. Medicare annual wellness visit, subsequent (Primary)    2. Renal insufficiency  -     sodium bicarbonate 650 MG tablet; Take 1 tablet by mouth 2 (Two) Times a Day.  Dispense: 60 tablet; Refill: 5    3. Nausea  -     promethazine (PHENERGAN) 25 MG tablet; Take 1 tablet by mouth Every 8 (Eight) Hours As Needed for Nausea or Vomiting.  Dispense: 30 tablet; Refill: 5    Other orders  -     Cancel: Pneumococcal Polysaccharide Vaccine 23-Valent (PPSV23) Greater Than or Equal To 1yo Subcutaneous / IM  -     baclofen (LIORESAL) 10 MG tablet; Take 1 tablet by mouth every night at bedtime.  Dispense: 30 tablet; Refill: 5

## 2022-05-13 RX ORDER — BACLOFEN 10 MG/1
TABLET ORAL
Qty: 90 TABLET | Refills: 0 | Status: SHIPPED | OUTPATIENT
Start: 2022-05-13 | End: 2022-08-31

## 2022-06-06 ENCOUNTER — OFFICE VISIT (OUTPATIENT)
Dept: FAMILY MEDICINE CLINIC | Facility: CLINIC | Age: 67
End: 2022-06-06

## 2022-06-06 VITALS
HEART RATE: 73 BPM | RESPIRATION RATE: 16 BRPM | DIASTOLIC BLOOD PRESSURE: 62 MMHG | BODY MASS INDEX: 23.49 KG/M2 | WEIGHT: 141 LBS | TEMPERATURE: 97.8 F | OXYGEN SATURATION: 93 % | HEIGHT: 65 IN | SYSTOLIC BLOOD PRESSURE: 156 MMHG

## 2022-06-06 DIAGNOSIS — N39.0 ACUTE UTI: Primary | ICD-10-CM

## 2022-06-06 LAB
BILIRUB BLD-MCNC: NEGATIVE MG/DL
CLARITY, POC: CLEAR
COLOR UR: YELLOW
EXPIRATION DATE: ABNORMAL
GLUCOSE UR STRIP-MCNC: NEGATIVE MG/DL
KETONES UR QL: NEGATIVE
LEUKOCYTE EST, POC: ABNORMAL
Lab: ABNORMAL
NITRITE UR-MCNC: POSITIVE MG/ML
PH UR: 7.5 [PH] (ref 5–8)
PROT UR STRIP-MCNC: ABNORMAL MG/DL
RBC # UR STRIP: ABNORMAL /UL
SP GR UR: 1.02 (ref 1–1.03)
UROBILINOGEN UR QL: NORMAL

## 2022-06-06 PROCEDURE — 99213 OFFICE O/P EST LOW 20 MIN: CPT | Performed by: NURSE PRACTITIONER

## 2022-06-06 PROCEDURE — 81003 URINALYSIS AUTO W/O SCOPE: CPT | Performed by: NURSE PRACTITIONER

## 2022-06-06 RX ORDER — SULFAMETHOXAZOLE AND TRIMETHOPRIM 800; 160 MG/1; MG/1
1 TABLET ORAL 2 TIMES DAILY
Qty: 10 TABLET | Refills: 0 | Status: SHIPPED | OUTPATIENT
Start: 2022-06-06 | End: 2022-06-11

## 2022-06-06 RX ORDER — CX-024414 0.2 MG/ML
INJECTION, SUSPENSION INTRAMUSCULAR
COMMUNITY
Start: 2022-05-27 | End: 2022-11-17

## 2022-06-06 RX ORDER — CIPROFLOXACIN 250 MG/1
250 TABLET, FILM COATED ORAL 2 TIMES DAILY
Qty: 10 TABLET | Refills: 0 | Status: SHIPPED | OUTPATIENT
Start: 2022-06-06 | End: 2022-06-06

## 2022-06-06 NOTE — PROGRESS NOTES
Subjective   Thuy Blackwood is a 66 y.o. female.     History of Present Illness   Thuy Blackwood 66 y.o.female complains of urinary symptoms. she complains of dysuria, frequency and foul smelling urine. She has had symptoms for a few days. The symptoms are marked.  Patient denies gross blood in urine.  Patient has tried  nothing for relief of symptoms.  Patient does have a history of recurrent UTI. Patient does not have a history of pyelonephritis.           The following portions of the patient's history were reviewed and updated as appropriate: allergies, current medications, past family history, past medical history, past social history, past surgical history and problem list.    Review of Systems   Constitutional: Negative for chills, fever and unexpected weight change.   Respiratory: Negative for shortness of breath.    Cardiovascular: Negative for chest pain and palpitations.   Genitourinary: Positive for dysuria and frequency. Negative for difficulty urinating, hematuria, pelvic pain and urgency.   Psychiatric/Behavioral: Negative for behavioral problems.       Objective   Physical Exam  Vitals and nursing note reviewed.   Constitutional:       Appearance: Normal appearance. She is well-developed.   Cardiovascular:      Rate and Rhythm: Normal rate.   Pulmonary:      Effort: Pulmonary effort is normal.   Neurological:      Mental Status: She is alert and oriented to person, place, and time.   Psychiatric:         Mood and Affect: Mood normal.         Behavior: Behavior normal.         Thought Content: Thought content normal.         Judgment: Judgment normal.         Assessment & Plan   Diagnoses and all orders for this visit:    1. Acute UTI (Primary)  -     POCT urinalysis dipstick, automated  -     Urine Culture - Urine, Urine, Clean Catch  -     Discontinue: ciprofloxacin (Cipro) 250 MG tablet; Take 1 tablet by mouth 2 (Two) Times a Day.  Dispense: 10 tablet; Refill: 0  -      sulfamethoxazole-trimethoprim (BACTRIM DS,SEPTRA DS) 800-160 MG per tablet; Take 1 tablet by mouth 2 (Two) Times a Day for 5 days.  Dispense: 10 tablet; Refill: 0

## 2022-06-10 LAB
BACTERIA UR CULT: ABNORMAL
BACTERIA UR CULT: ABNORMAL
OTHER ANTIBIOTIC SUSC ISLT: ABNORMAL

## 2022-08-11 ENCOUNTER — TELEPHONE (OUTPATIENT)
Dept: FAMILY MEDICINE CLINIC | Facility: CLINIC | Age: 67
End: 2022-08-11

## 2022-08-11 DIAGNOSIS — Z78.0 MENOPAUSE: Primary | ICD-10-CM

## 2022-08-11 NOTE — TELEPHONE ENCOUNTER
Caller: Thuy Blackwood    Relationship to patient: Self    Best call back number: [phone]    Patient is needing: PATIENT IS WANTING TO GET A BONE DENSITY AT [facility] [place] AT [address]. PATIENT NEEDS AN ORDER PLACED. FAX NUMBER: [phone]

## 2022-08-11 NOTE — TELEPHONE ENCOUNTER
LVM for patient to let her know order has been placed.              *OK FOR HUB TO GIVE INFORMATION.

## 2022-08-31 RX ORDER — BACLOFEN 10 MG/1
TABLET ORAL
Qty: 90 TABLET | Refills: 0 | Status: SHIPPED | OUTPATIENT
Start: 2022-08-31 | End: 2022-11-17 | Stop reason: SDUPTHER

## 2022-09-08 ENCOUNTER — APPOINTMENT (OUTPATIENT)
Dept: WOMENS IMAGING | Facility: HOSPITAL | Age: 67
End: 2022-09-08

## 2022-09-08 ENCOUNTER — HOSPITAL ENCOUNTER (OUTPATIENT)
Dept: PET IMAGING | Facility: HOSPITAL | Age: 67
Discharge: HOME | End: 2022-09-08

## 2022-09-08 PROCEDURE — 77063 BREAST TOMOSYNTHESIS BI: CPT | Performed by: RADIOLOGY

## 2022-09-08 PROCEDURE — 77067 SCR MAMMO BI INCL CAD: CPT | Performed by: RADIOLOGY

## 2022-09-08 PROCEDURE — 77080 DXA BONE DENSITY AXIAL: CPT

## 2022-09-13 DIAGNOSIS — R92.8 ABNORMALITY OF RIGHT BREAST ON SCREENING MAMMOGRAM: ICD-10-CM

## 2022-09-13 DIAGNOSIS — Z85.3 PERSONAL HISTORY OF MALIGNANT NEOPLASM OF BREAST: Primary | ICD-10-CM

## 2022-09-19 ENCOUNTER — APPOINTMENT (OUTPATIENT)
Dept: WOMENS IMAGING | Facility: HOSPITAL | Age: 67
End: 2022-09-19

## 2022-09-19 PROCEDURE — 76642 ULTRASOUND BREAST LIMITED: CPT | Performed by: RADIOLOGY

## 2022-09-19 PROCEDURE — G0279 TOMOSYNTHESIS, MAMMO: HCPCS | Performed by: RADIOLOGY

## 2022-09-19 PROCEDURE — 77065 DX MAMMO INCL CAD UNI: CPT | Performed by: RADIOLOGY

## 2022-10-06 NOTE — PATIENT INSTRUCTIONS
Exercise 30 minutes most days of the week  Sleep 6-8 hours each night if possible  Low fat, low cholesterol diet   we discussed prescribed medications and how to take them   make sure you get results of any labs/studies ordered today  Low glycemic index diet       
Detail Level: Detailed
Detail Level: Simple

## 2022-11-07 ENCOUNTER — TELEPHONE (OUTPATIENT)
Dept: FAMILY MEDICINE CLINIC | Facility: CLINIC | Age: 67
End: 2022-11-07

## 2022-11-07 DIAGNOSIS — E78.5 HYPERLIPIDEMIA, UNSPECIFIED HYPERLIPIDEMIA TYPE: Primary | ICD-10-CM

## 2022-11-07 DIAGNOSIS — M85.89 OSTEOPENIA OF MULTIPLE SITES: ICD-10-CM

## 2022-11-07 NOTE — TELEPHONE ENCOUNTER
Caller: Thuy Blackwood    Relationship: Self    Best call back number:   [phone]            What orders are you requesting (i.e. lab or imaging): LAB WORK BEFORE HER APPT NEXT WEEK    In what timeframe would the patient need to come in: THIS WEEK     Where will you receive your lab/imaging services: OFFICE    Additional notes: CAN YOU CALL AND LET HER KNOW WHEN SHE CAN SCHEDULE.

## 2022-11-08 NOTE — TELEPHONE ENCOUNTER
Caller: Thuy Blackwood    Relationship: Self    Best call back number: [phone]    What orders are you requesting (i.e. lab or imaging): BLOOD LABWORK    Additional notes: PATIENT CALLING TO CHECK STATUS OF ORDERS SO SHE MAY SCHEDULE HER LABWORK.    PLEASE PUT IN ORDERS AND CALL TO SCHEDULE.

## 2022-11-16 LAB
ALBUMIN SERPL-MCNC: 4.2 G/DL (ref 3.5–5.2)
ALBUMIN/GLOB SERPL: 1.8 G/DL
ALP SERPL-CCNC: 52 U/L (ref 39–117)
ALT SERPL-CCNC: 12 U/L (ref 1–33)
AST SERPL-CCNC: 23 U/L (ref 1–32)
BASOPHILS # BLD AUTO: 0.04 10*3/MM3 (ref 0–0.2)
BASOPHILS NFR BLD AUTO: 1.2 % (ref 0–1.5)
BILIRUB SERPL-MCNC: 0.4 MG/DL (ref 0–1.2)
BUN SERPL-MCNC: 16 MG/DL (ref 8–23)
BUN/CREAT SERPL: 18.4 (ref 7–25)
CALCIUM SERPL-MCNC: 9.2 MG/DL (ref 8.6–10.5)
CHLORIDE SERPL-SCNC: 107 MMOL/L (ref 98–107)
CHOLEST SERPL-MCNC: 183 MG/DL (ref 0–200)
CO2 SERPL-SCNC: 26.9 MMOL/L (ref 22–29)
CREAT SERPL-MCNC: 0.87 MG/DL (ref 0.57–1)
EGFRCR SERPLBLD CKD-EPI 2021: 72.2 ML/MIN/1.73
EOSINOPHIL # BLD AUTO: 0.19 10*3/MM3 (ref 0–0.4)
EOSINOPHIL NFR BLD AUTO: 5.5 % (ref 0.3–6.2)
ERYTHROCYTE [DISTWIDTH] IN BLOOD BY AUTOMATED COUNT: 12.3 % (ref 12.3–15.4)
GLOBULIN SER CALC-MCNC: 2.3 GM/DL
GLUCOSE SERPL-MCNC: 89 MG/DL (ref 65–99)
HCT VFR BLD AUTO: 36.7 % (ref 34–46.6)
HDLC SERPL-MCNC: 59 MG/DL (ref 40–60)
HGB BLD-MCNC: 12.5 G/DL (ref 12–15.9)
IMM GRANULOCYTES # BLD AUTO: 0.01 10*3/MM3 (ref 0–0.05)
IMM GRANULOCYTES NFR BLD AUTO: 0.3 % (ref 0–0.5)
LDLC SERPL CALC-MCNC: 108 MG/DL (ref 0–100)
LYMPHOCYTES # BLD AUTO: 1 10*3/MM3 (ref 0.7–3.1)
LYMPHOCYTES NFR BLD AUTO: 28.9 % (ref 19.6–45.3)
MCH RBC QN AUTO: 31.5 PG (ref 26.6–33)
MCHC RBC AUTO-ENTMCNC: 34.1 G/DL (ref 31.5–35.7)
MCV RBC AUTO: 92.4 FL (ref 79–97)
MONOCYTES # BLD AUTO: 0.39 10*3/MM3 (ref 0.1–0.9)
MONOCYTES NFR BLD AUTO: 11.3 % (ref 5–12)
NEUTROPHILS # BLD AUTO: 1.83 10*3/MM3 (ref 1.7–7)
NEUTROPHILS NFR BLD AUTO: 52.8 % (ref 42.7–76)
NRBC BLD AUTO-RTO: 0 /100 WBC (ref 0–0.2)
PLATELET # BLD AUTO: 284 10*3/MM3 (ref 140–450)
POTASSIUM SERPL-SCNC: 4.2 MMOL/L (ref 3.5–5.2)
PROT SERPL-MCNC: 6.5 G/DL (ref 6–8.5)
RBC # BLD AUTO: 3.97 10*6/MM3 (ref 3.77–5.28)
SODIUM SERPL-SCNC: 141 MMOL/L (ref 136–145)
TRIGL SERPL-MCNC: 87 MG/DL (ref 0–150)
TSH SERPL DL<=0.005 MIU/L-ACNC: 1.97 UIU/ML (ref 0.27–4.2)
VLDLC SERPL CALC-MCNC: 16 MG/DL (ref 5–40)
WBC # BLD AUTO: 3.46 10*3/MM3 (ref 3.4–10.8)

## 2022-11-17 ENCOUNTER — OFFICE VISIT (OUTPATIENT)
Dept: FAMILY MEDICINE CLINIC | Facility: CLINIC | Age: 67
End: 2022-11-17

## 2022-11-17 VITALS
RESPIRATION RATE: 16 BRPM | OXYGEN SATURATION: 98 % | HEART RATE: 63 BPM | TEMPERATURE: 97.5 F | DIASTOLIC BLOOD PRESSURE: 78 MMHG | HEIGHT: 65 IN | BODY MASS INDEX: 23.32 KG/M2 | SYSTOLIC BLOOD PRESSURE: 130 MMHG | WEIGHT: 140 LBS

## 2022-11-17 DIAGNOSIS — R11.0 NAUSEA: ICD-10-CM

## 2022-11-17 DIAGNOSIS — N28.9 RENAL INSUFFICIENCY: ICD-10-CM

## 2022-11-17 DIAGNOSIS — I83.811 VARICOSE VEINS OF LEG WITH PAIN, RIGHT: Primary | ICD-10-CM

## 2022-11-17 PROCEDURE — 99214 OFFICE O/P EST MOD 30 MIN: CPT | Performed by: NURSE PRACTITIONER

## 2022-11-17 RX ORDER — ONDANSETRON 4 MG/1
4 TABLET, FILM COATED ORAL EVERY 8 HOURS PRN
Qty: 30 TABLET | Refills: 5 | Status: SHIPPED | OUTPATIENT
Start: 2022-11-17

## 2022-11-17 RX ORDER — PROMETHAZINE HYDROCHLORIDE 25 MG/1
25 TABLET ORAL EVERY 8 HOURS PRN
Qty: 30 TABLET | Refills: 5 | Status: SHIPPED | OUTPATIENT
Start: 2022-11-17

## 2022-11-17 RX ORDER — BACLOFEN 10 MG/1
10 TABLET ORAL
Qty: 90 TABLET | Refills: 3 | Status: SHIPPED | OUTPATIENT
Start: 2022-11-17

## 2022-11-17 RX ORDER — SODIUM BICARBONATE 650 MG/1
650 TABLET ORAL 2 TIMES DAILY
Qty: 180 TABLET | Refills: 3 | Status: SHIPPED | OUTPATIENT
Start: 2022-11-17

## 2022-11-17 NOTE — PROGRESS NOTES
"Subjective   Thuy Blackwood is a 67 y.o. female.     History of Present Illness    Since the last visit, she has overall felt well.  She has Hyperlipidemia and working on this with diet and exercise.  she has been compliant with current medications have reviewed them.  The patient denies medication side effects.  Will refill medications. /78   Pulse 63   Temp 97.5 °F (36.4 °C)   Resp 16   Ht 165.1 cm (65\")   Wt 63.5 kg (140 lb)   LMP  (LMP Unknown)   SpO2 98%   BMI 23.30 kg/m²     Results for orders placed or performed in visit on 11/07/22   Comprehensive metabolic panel    Specimen: Blood   Result Value Ref Range    Glucose 89 65 - 99 mg/dL    BUN 16 8 - 23 mg/dL    Creatinine 0.87 0.57 - 1.00 mg/dL    EGFR Result 72.2 >60.0 mL/min/1.73    BUN/Creatinine Ratio 18.4 7.0 - 25.0    Sodium 141 136 - 145 mmol/L    Potassium 4.2 3.5 - 5.2 mmol/L    Chloride 107 98 - 107 mmol/L    Total CO2 26.9 22.0 - 29.0 mmol/L    Calcium 9.2 8.6 - 10.5 mg/dL    Total Protein 6.5 6.0 - 8.5 g/dL    Albumin 4.20 3.50 - 5.20 g/dL    Globulin 2.3 gm/dL    A/G Ratio 1.8 g/dL    Total Bilirubin 0.4 0.0 - 1.2 mg/dL    Alkaline Phosphatase 52 39 - 117 U/L    AST (SGOT) 23 1 - 32 U/L    ALT (SGPT) 12 1 - 33 U/L   Lipid panel    Specimen: Blood   Result Value Ref Range    Total Cholesterol 183 0 - 200 mg/dL    Triglycerides 87 0 - 150 mg/dL    HDL Cholesterol 59 40 - 60 mg/dL    VLDL Cholesterol Will 16 5 - 40 mg/dL    LDL Chol Calc ([facility]) 108 (H) 0 - 100 mg/dL   TSH    Specimen: Blood   Result Value Ref Range    TSH 1.970 0.270 - 4.200 uIU/mL   CBC w MANUAL Differential    Specimen: Blood   Result Value Ref Range    WBC 3.46 3.40 - 10.80 10*3/mm3    RBC 3.97 3.77 - 5.28 10*6/mm3    Hemoglobin 12.5 12.0 - 15.9 g/dL    Hematocrit 36.7 34.0 - 46.6 %    MCV 92.4 79.0 - 97.0 fL    MCH 31.5 26.6 - 33.0 pg    MCHC 34.1 31.5 - 35.7 g/dL    RDW 12.3 12.3 - 15.4 %    Platelets 284 140 - 450 10*3/mm3    Neutrophil Rel % 52.8 42.7 - 76.0 % "    Lymphocyte Rel % 28.9 19.6 - 45.3 %    Monocyte Rel % 11.3 5.0 - 12.0 %    Eosinophil Rel % 5.5 0.3 - 6.2 %    Basophil Rel % 1.2 0.0 - 1.5 %    Neutrophils Absolute 1.83 1.70 - 7.00 10*3/mm3    Lymphocytes Absolute 1.00 0.70 - 3.10 10*3/mm3    Monocytes Absolute 0.39 0.10 - 0.90 10*3/mm3    Eosinophils Absolute 0.19 0.00 - 0.40 10*3/mm3    Basophils Absolute 0.04 0.00 - 0.20 10*3/mm3    Immature Granulocyte Rel % 0.3 0.0 - 0.5 %    Immature Grans Absolute 0.01 0.00 - 0.05 10*3/mm3    nRBC 0.0 0.0 - 0.2 /100 WBC         Labs reviewed with pt today during visit. All questions answered.     She has history of colitis and chronic diarrhea.  She has chronic, intermittent nausea and uses either Zofran or Phenergan as needed. She will take Zofran if needed during the day but Phenergan at night.       She reports varicose veins to her right thigh for years but states they are painful and swollen at times. She would like referral to discuss treatment.  The following portions of the patient's history were reviewed and updated as appropriate: allergies, current medications, past family history, past medical history, past social history, past surgical history and problem list.    Review of Systems   Constitutional: Negative for unexpected weight change.   Respiratory: Negative for shortness of breath.    Cardiovascular: Negative for chest pain and palpitations.   Psychiatric/Behavioral: Negative for behavioral problems.       Objective   Physical Exam  Vitals and nursing note reviewed.   Constitutional:       Appearance: Normal appearance. She is well-developed.   Neck:      Vascular: No carotid bruit.   Cardiovascular:      Rate and Rhythm: Normal rate and regular rhythm.   Pulmonary:      Effort: Pulmonary effort is normal.      Breath sounds: Normal breath sounds.   Neurological:      Mental Status: She is alert and oriented to person, place, and time.   Psychiatric:         Mood and Affect: Mood normal.          Behavior: Behavior normal.         Thought Content: Thought content normal.         Judgment: Judgment normal.         Assessment & Plan   Diagnoses and all orders for this visit:    1. Varicose veins of leg with pain, right (Primary)  -     Ambulatory Referral to Vascular Surgery    2. Nausea  -     promethazine (PHENERGAN) 25 MG tablet; Take 1 tablet by mouth Every 8 (Eight) Hours As Needed for Nausea or Vomiting.  Dispense: 30 tablet; Refill: 5    3. Renal insufficiency  -     sodium bicarbonate 650 MG tablet; Take 1 tablet by mouth 2 (Two) Times a Day.  Dispense: 180 tablet; Refill: 3    Other orders  -     baclofen (LIORESAL) 10 MG tablet; Take 1 tablet by mouth every night at bedtime.  Dispense: 90 tablet; Refill: 3  -     ondansetron (Zofran) 4 MG tablet; Take 1 tablet by mouth Every 8 (Eight) Hours As Needed for Nausea or Vomiting.  Dispense: 30 tablet; Refill: 5

## 2023-09-20 ENCOUNTER — APPOINTMENT (OUTPATIENT)
Dept: WOMENS IMAGING | Facility: HOSPITAL | Age: 68
End: 2023-09-20
Payer: MEDICARE

## 2023-09-20 PROCEDURE — 77067 SCR MAMMO BI INCL CAD: CPT | Performed by: RADIOLOGY

## 2023-09-20 PROCEDURE — 77063 BREAST TOMOSYNTHESIS BI: CPT | Performed by: RADIOLOGY

## 2023-11-07 DIAGNOSIS — N28.9 RENAL INSUFFICIENCY: ICD-10-CM

## 2023-11-07 RX ORDER — SODIUM BICARBONATE 650 MG/1
650 TABLET ORAL 2 TIMES DAILY
Qty: 60 TABLET | Refills: 0 | Status: SHIPPED | OUTPATIENT
Start: 2023-11-07

## 2023-11-07 RX ORDER — BACLOFEN 10 MG/1
10 TABLET ORAL
Qty: 30 TABLET | Refills: 0 | Status: SHIPPED | OUTPATIENT
Start: 2023-11-07

## 2023-11-10 DIAGNOSIS — M85.89 OSTEOPENIA OF MULTIPLE SITES: Primary | ICD-10-CM

## 2023-11-10 DIAGNOSIS — E78.5 HYPERLIPIDEMIA, UNSPECIFIED HYPERLIPIDEMIA TYPE: ICD-10-CM

## 2023-11-14 LAB
ALBUMIN SERPL-MCNC: 4.3 G/DL (ref 3.9–4.9)
ALBUMIN/GLOB SERPL: 1.7 {RATIO} (ref 1.2–2.2)
ALP SERPL-CCNC: 58 IU/L (ref 44–121)
ALT SERPL-CCNC: 13 IU/L (ref 0–32)
AST SERPL-CCNC: 22 IU/L (ref 0–40)
BASOPHILS # BLD AUTO: 0 X10E3/UL (ref 0–0.2)
BASOPHILS NFR BLD AUTO: 1 %
BILIRUB SERPL-MCNC: 0.3 MG/DL (ref 0–1.2)
BUN SERPL-MCNC: 19 MG/DL (ref 8–27)
BUN/CREAT SERPL: 20 (ref 12–28)
CALCIUM SERPL-MCNC: 9.4 MG/DL (ref 8.7–10.3)
CHLORIDE SERPL-SCNC: 104 MMOL/L (ref 96–106)
CHOLEST SERPL-MCNC: 198 MG/DL (ref 100–199)
CO2 SERPL-SCNC: 25 MMOL/L (ref 20–29)
CREAT SERPL-MCNC: 0.93 MG/DL (ref 0.57–1)
EGFRCR SERPLBLD CKD-EPI 2021: 66 ML/MIN/1.73
EOSINOPHIL # BLD AUTO: 0.3 X10E3/UL (ref 0–0.4)
EOSINOPHIL NFR BLD AUTO: 5 %
ERYTHROCYTE [DISTWIDTH] IN BLOOD BY AUTOMATED COUNT: 13.1 % (ref 11.7–15.4)
GLOBULIN SER CALC-MCNC: 2.5 G/DL (ref 1.5–4.5)
GLUCOSE SERPL-MCNC: 96 MG/DL (ref 70–99)
HCT VFR BLD AUTO: 40.9 % (ref 34–46.6)
HDLC SERPL-MCNC: 60 MG/DL
HGB BLD-MCNC: 13.2 G/DL (ref 11.1–15.9)
IMM GRANULOCYTES # BLD AUTO: 0 X10E3/UL (ref 0–0.1)
IMM GRANULOCYTES NFR BLD AUTO: 0 %
LDLC SERPL CALC-MCNC: 121 MG/DL (ref 0–99)
LYMPHOCYTES # BLD AUTO: 1.4 X10E3/UL (ref 0.7–3.1)
LYMPHOCYTES NFR BLD AUTO: 27 %
MCH RBC QN AUTO: 30.9 PG (ref 26.6–33)
MCHC RBC AUTO-ENTMCNC: 32.3 G/DL (ref 31.5–35.7)
MCV RBC AUTO: 96 FL (ref 79–97)
MONOCYTES # BLD AUTO: 0.6 X10E3/UL (ref 0.1–0.9)
MONOCYTES NFR BLD AUTO: 11 %
NEUTROPHILS # BLD AUTO: 2.9 X10E3/UL (ref 1.4–7)
NEUTROPHILS NFR BLD AUTO: 56 %
PLATELET # BLD AUTO: 344 X10E3/UL (ref 150–450)
POTASSIUM SERPL-SCNC: 4.6 MMOL/L (ref 3.5–5.2)
PROT SERPL-MCNC: 6.8 G/DL (ref 6–8.5)
RBC # BLD AUTO: 4.27 X10E6/UL (ref 3.77–5.28)
SODIUM SERPL-SCNC: 139 MMOL/L (ref 134–144)
TRIGL SERPL-MCNC: 93 MG/DL (ref 0–149)
TSH SERPL DL<=0.005 MIU/L-ACNC: 3.51 UIU/ML (ref 0.45–4.5)
VLDLC SERPL CALC-MCNC: 17 MG/DL (ref 5–40)
WBC # BLD AUTO: 5.2 X10E3/UL (ref 3.4–10.8)

## 2023-11-16 ENCOUNTER — OFFICE VISIT (OUTPATIENT)
Dept: FAMILY MEDICINE CLINIC | Facility: CLINIC | Age: 68
End: 2023-11-16
Payer: MEDICARE

## 2023-11-16 VITALS
HEIGHT: 65 IN | HEART RATE: 74 BPM | RESPIRATION RATE: 16 BRPM | WEIGHT: 138 LBS | TEMPERATURE: 97.2 F | SYSTOLIC BLOOD PRESSURE: 128 MMHG | BODY MASS INDEX: 22.99 KG/M2 | OXYGEN SATURATION: 97 % | DIASTOLIC BLOOD PRESSURE: 70 MMHG

## 2023-11-16 DIAGNOSIS — R11.0 NAUSEA: ICD-10-CM

## 2023-11-16 DIAGNOSIS — N28.9 RENAL INSUFFICIENCY: ICD-10-CM

## 2023-11-16 DIAGNOSIS — Z00.00 MEDICARE ANNUAL WELLNESS VISIT, SUBSEQUENT: Primary | ICD-10-CM

## 2023-11-16 RX ORDER — PROMETHAZINE HYDROCHLORIDE 25 MG/1
25 TABLET ORAL EVERY 8 HOURS PRN
Qty: 30 TABLET | Refills: 5 | Status: SHIPPED | OUTPATIENT
Start: 2023-11-16

## 2023-11-16 RX ORDER — BACLOFEN 10 MG/1
10 TABLET ORAL
Qty: 90 TABLET | Refills: 3 | Status: SHIPPED | OUTPATIENT
Start: 2023-11-16

## 2023-11-16 RX ORDER — SODIUM BICARBONATE 650 MG/1
650 TABLET ORAL 2 TIMES DAILY
Qty: 180 TABLET | Refills: 3 | Status: SHIPPED | OUTPATIENT
Start: 2023-11-16

## 2023-11-16 RX ORDER — ONDANSETRON 4 MG/1
4 TABLET, FILM COATED ORAL EVERY 8 HOURS PRN
Qty: 30 TABLET | Refills: 5 | Status: SHIPPED | OUTPATIENT
Start: 2023-11-16

## 2023-11-16 RX ORDER — MULTIVIT WITH MINERALS/LUTEIN
250 TABLET ORAL DAILY
COMMUNITY

## 2023-11-16 NOTE — PROGRESS NOTES
The ABCs of the Annual Wellness Visit  Subsequent Medicare Wellness Visit    Subjective      Thuy Blackwood is a 68 y.o. female who presents for a Subsequent Medicare Wellness Visit.    The following portions of the patient's history were reviewed and   updated as appropriate: allergies, current medications, past family history, past medical history, past social history, past surgical history, and problem list.    Compared to one year ago, the patient feels her physical   health is the same.    Compared to one year ago, the patient feels her mental   health is the same.    Recent Hospitalizations:  She was not admitted to the hospital during the last year.       Current Medical Providers:  Patient Care Team:  Cecilia Garza APRN ([place]) as PCP - General (Family Medicine)  Dereck Dinh MD as Consulting Physician (Hematology and Oncology)  Larry Allan MD as Referring Physician (Family Medicine)    Outpatient Medications Prior to Visit   Medication Sig Dispense Refill    baclofen (LIORESAL) 10 MG tablet TAKE 1 TABLET BY MOUTH EVERYDAY AT BEDTIME 30 tablet 0    cholecalciferol (VITAMIN D3) 1000 UNITS tablet Take 1 tablet by mouth Every Other Day.      CINNAMON PO Take 1,000 mg by mouth 2 (two) times a day.      COCONUT OIL PO Take 1,000 mg by mouth 2 (two) times a day.      MAGNESIUM-POTASSIUM PO Take 700 mg by mouth Every Night.      Multiple Vitamin (MULTI-VITAMIN DAILY PO) Take 1 tablet by mouth Daily.      ondansetron (Zofran) 4 MG tablet Take 1 tablet by mouth Every 8 (Eight) Hours As Needed for Nausea or Vomiting. 30 tablet 5    promethazine (PHENERGAN) 25 MG tablet Take 1 tablet by mouth Every 8 (Eight) Hours As Needed for Nausea or Vomiting. 30 tablet 5    sodium bicarbonate 650 MG tablet TAKE 1 TABLET BY MOUTH TWICE A DAY 60 tablet 0    vitamin C (ASCORBIC ACID) 250 MG tablet Take 1 tablet by mouth Daily.      zinc sulfate (ZINCATE) 220 (50 Zn) MG capsule Take 1 capsule by mouth Daily.       "benzonatate (TESSALON) 100 MG capsule Take 1 capsule by mouth 3 (Three) Times a Day As Needed for Cough. (Patient not taking: Reported on 11/16/2023) 30 capsule 0    ipratropium (ATROVENT) 0.06 % nasal spray 2 sprays into the nostril(s) as directed by provider 4 (Four) Times a Day. (Patient not taking: Reported on 11/16/2023) 1 each 0    oseltamivir (Tamiflu) 75 MG capsule Take 1 capsule by mouth 2 (Two) Times a Day. (Patient not taking: Reported on 11/16/2023) 10 capsule 0    sulfamethoxazole-trimethoprim (Bactrim DS) 800-160 MG per tablet Take 1 tablet by mouth 2 (Two) Times a Day. (Patient not taking: Reported on 11/16/2023) 14 tablet 0     No facility-administered medications prior to visit.       No opioid medication identified on active medication list. I have reviewed chart for other potential  high risk medication/s and harmful drug interactions in the elderly.        Aspirin is not on active medication list.  Aspirin use is not indicated based on review of current medical condition/s. Risk of harm outweighs potential benefits.  .    Patient Active Problem List   Diagnosis    Hyperlipidemia    Osteopenia of multiple sites    Personal history of malignant neoplasm of breast    Urinary tract infection with hematuria    Lower abdominal pain    Diarrhea, unspecified    Duodenal diverticulum    Functional diarrhea    Internal hemorrhoids    Acute superficial gastritis without hemorrhage    Pyelonephritis    Microscopic colitis    Nausea & vomiting    Rigors    Sepsis, unspecified organism    Fever    Hematuria     Advance Care Planning   Advance Care Planning     Advance Directive is not on file.       Objective    Vitals:    11/16/23 0853   BP: 128/70   Pulse: 74   Resp: 16   Temp: 97.2 °F (36.2 °C)   SpO2: 97%   Weight: 62.6 kg (138 lb)   Height: 165.1 cm (65\")     Estimated body mass index is 22.96 kg/m² as calculated from the following:    Height as of this encounter: 165.1 cm (65\").    Weight as of this " encounter: 62.6 kg (138 lb).    BMI is within normal parameters. No other follow-up for BMI required.      Does the patient have evidence of cognitive impairment?   No    Lab Results   Component Value Date    CHLPL 198 2023    TRIG 93 2023    HDL 60 2023     (H) 2023    VLDL 17 2023          HEALTH RISK ASSESSMENT    Smoking Status:  Social History     Tobacco Use   Smoking Status Never   Smokeless Tobacco Never     Alcohol Consumption:  Social History     Substance and Sexual Activity   Alcohol Use Yes    Comment: occasional     Fall Risk Screen:    STEADI Fall Risk Assessment was completed, and patient is at LOW risk for falls.Assessment completed on:2023    Depression Screenin/16/2023     8:00 AM   PHQ-2/PHQ-9 Depression Screening   Little Interest or Pleasure in Doing Things 0-->not at all   Feeling Down, Depressed or Hopeless 0-->not at all   PHQ-9: Brief Depression Severity Measure Score 0       Health Habits and Functional and Cognitive Screenin/16/2023     8:00 AM   Functional & Cognitive Status   Do you have difficulty preparing food and eating? No   Do you have difficulty bathing yourself, getting dressed or grooming yourself? No   Do you have difficulty using the toilet? No   Do you have difficulty moving around from place to place? No   Do you have trouble with steps or getting out of a bed or a chair? No   Current Diet Well Balanced Diet   Dental Exam Up to date   Eye Exam Up to date   Exercise (times per week) 0 times per week   Current Exercises Include No Regular Exercise   Do you need help using the phone?  No   Are you deaf or do you have serious difficulty hearing?  No   Do you need help to go to places out of walking distance? No   Do you need help shopping? No   Do you need help preparing meals?  No   Do you need help with housework?  No   Do you need help with laundry? No   Do you need help taking your medications? No   Do you need  help managing money? No   Do you ever drive or ride in a car without wearing a seat belt? No   Have you felt unusual stress, anger or loneliness in the last month? No   Who do you live with? Spouse   If you need help, do you have trouble finding someone available to you? No   Have you been bothered in the last four weeks by sexual problems? No   Do you have difficulty concentrating, remembering or making decisions? No       Age-appropriate Screening Schedule:  Refer to the list below for future screening recommendations based on patient's age, sex and/or medical conditions. Orders for these recommended tests are listed in the plan section. The patient has been provided with a written plan.    Health Maintenance   Topic Date Due    ANNUAL WELLNESS VISIT  11/11/2022    COVID-19 Vaccine (6 - 2023-24 season) 09/01/2023    DXA SCAN  09/08/2024    LIPID PANEL  11/13/2024    MAMMOGRAM  09/20/2025    TDAP/TD VACCINES (2 - Td or Tdap) 05/09/2027    COLORECTAL CANCER SCREENING  04/29/2031    INFLUENZA VACCINE  Completed    Pneumococcal Vaccine 65+  Completed    ZOSTER VACCINE  Addressed    HEPATITIS C SCREENING  Discontinued    PAP SMEAR  Discontinued                  CMS Preventative Services Quick Reference  Risk Factors Identified During Encounter:    None Identified    The above risks/problems have been discussed with the patient.  Pertinent information has been shared with the patient in the After Visit Summary.    There are no diagnoses linked to this encounter.    Follow Up:   Next Medicare Wellness visit to be scheduled in 1 year.      An After Visit Summary and PPPS were made available to the patient.

## 2023-11-16 NOTE — PATIENT INSTRUCTIONS
Medicare Wellness  Personal Prevention Plan of Service     Date of Office Visit:    Encounter Provider:  JESÚS Becker  Place of Service:  [facility]  Patient Name: Thuy Blackwood  :  1955    As part of the Medicare Wellness portion of your visit today, we are providing you with this personalized preventive plan of services (PPPS). This plan is based upon recommendations of the [place] Preventive Services Task Force (USPSTF) and the Advisory Committee on Immunization Practices (ACIP).    This lists the preventive care services that should be considered, and provides dates of when you are due. Items listed as completed are up-to-date and do not require any further intervention.    Health Maintenance   Topic Date Due    COVID-19 Vaccine (2023- season) 2023    DXA SCAN  2024    LIPID PANEL  2024    ANNUAL WELLNESS VISIT  2024    MAMMOGRAM  2025    TDAP/TD VACCINES (2 - Td or Tdap) 2027    COLORECTAL CANCER SCREENING  2031    INFLUENZA VACCINE  Completed    Pneumococcal Vaccine 65+  Completed    ZOSTER VACCINE  Addressed    HEPATITIS C SCREENING  Discontinued    PAP SMEAR  Discontinued       No orders of the defined types were placed in this encounter.      Return in about 1 year (around 2024) for Next scheduled follow up.

## 2024-04-05 NOTE — PROGRESS NOTES
Chief Complaint  Follow-up (10wk follow up)    Subjective        Thuy Blackwood presents to [facility] VASC[facility] SUR[facility]ERY  History of Present Illness  The patient is status post Varithena chemical ablation of multiple extension and tributary varices of the right lower extremity on November 17, 2023 and on the left lower knee on December 1, 2023.  Initial follow-up scans have demonstrated successful closure of the veins with no DVT.  She has had significant decompression of her varices.  She returned on April 9, 2024 with her 4 month follow-up.    Past History:  Medical History: has a past medical history of Acute sinusitis, Anemia, Anesthesia complication, Arthritis, Breast cancer, Colon cancer screening (04/11/2019), H/O bone density study (08/2016), H/O Breast cancer, H/O complete eye exam (2014), Hyperlipidemia, Osteopenia, Personal history of malignant neoplasm of breast, PONV (postoperative nausea and vomiting), Renal insufficiency, Unspecified kidney failure, UTI (urinary tract infection), Varicose veins of bilateral lower extremities with pain, and Venous insufficiency (chronic) (peripheral).   Surgical History: has a past surgical history that includes Bladder suspension; Hand surgery; Mastectomy (Left, 1992); Mammo bilateral (08/2016); Pap Smear (scheduled); Hysterectomy (1995); Tubal ligation (1986); endoscopy and colonoscopy (06/2010); Laparoscopic cholecystectomy (N/A, 06/28/2010); Colonoscopy (N/A, 04/29/2021); Esophagogastroduodenoscopy (N/A, 04/29/2021); and Bladder repair.   Family History: family history includes Cancer in an other family member; Depression in an other family member; Diabetes in her father; Heart attack in an other family member; Heart disease in an other family member; Hypertension in an other family member; Rheum arthritis in an other family member.   Social History: reports that she has never smoked. She has never used smokeless tobacco. She reports current  "alcohol use. She reports that she does not use drugs.    (Not in a hospital admission)     Allergies: Doxycycline hyclate and Levaquin [levofloxacin]   Objective   Vital Signs:  /72   Ht 165.1 cm (65\")   Wt 62.6 kg (138 lb)   BMI 22.96 kg/m²   Estimated body mass index is 22.96 kg/m² as calculated from the following:    Height as of this encounter: 165.1 cm (65\").    Weight as of this encounter: 62.6 kg (138 lb).       BMI is within normal parameters. No other follow-up for BMI required.      Physical Exam Thrombosed varices with marked decompression of varices on the right from the upper thigh to the lower calf.  Hyperpigmentation is fading.  Thrombosed varices on the left from the knee distally.  Overall, minimal.  Few new areas of neovascularization spider telangiectasia.  Result Review :    The following data was reviewed by: Dana Jin Jr., MD on 04/09/2024:    Data reviewed : Venous scans from November 21, 2023, December 5, 2023, and January 23, 2024, findings as described above.             Assessment and Plan     Diagnoses and all orders for this visit:    1. Varicose veins of leg with pain, bilateral (Primary)    2. Venous (peripheral) insufficiency    Overall, she had a very good result and she is very pleased with her results.  She has had complete resolution of pain and marked decompression of her varices.  She is not concerned about her neovascularization.  She will continue compression stockings to treat her venous insufficiency and I will see her in follow-up in 8 months or sooner if new problems arise.  I discussed the possibility of injection sclerotherapy but at this point she is not interested.         Follow Up     Return in about 8 months (around 12/9/2024).  Patient was given instructions and counseling regarding her condition or for health maintenance advice. Please see specific information pulled into the AVS if appropriate.       "

## 2024-04-09 ENCOUNTER — OFFICE VISIT (OUTPATIENT)
Age: 69
End: 2024-04-09
Payer: MEDICARE

## 2024-04-09 VITALS
WEIGHT: 138 LBS | SYSTOLIC BLOOD PRESSURE: 118 MMHG | BODY MASS INDEX: 22.99 KG/M2 | DIASTOLIC BLOOD PRESSURE: 72 MMHG | HEIGHT: 65 IN

## 2024-04-09 DIAGNOSIS — I83.813 VARICOSE VEINS OF LEG WITH PAIN, BILATERAL: Primary | ICD-10-CM

## 2024-04-09 DIAGNOSIS — I87.2 VENOUS (PERIPHERAL) INSUFFICIENCY: ICD-10-CM

## 2024-04-25 ENCOUNTER — TELEPHONE (OUTPATIENT)
Dept: ONCOLOGY | Facility: CLINIC | Age: 69
End: 2024-04-25
Payer: MEDICARE

## 2024-04-25 NOTE — TELEPHONE ENCOUNTER
"  Caller: Thuy Blackwood \"Neil\"    Relationship: Self    Best call back number: [phone]    What is the best time to reach you: ANYTIME    Who are you requesting to speak with (clinical staff, provider,  specific staff member): CLINICAL     What was the call regarding: PATIENT WAS CALLING TO SCHEDULE YEARLY F/U BUT HER LAST OFFICE VISIT WAS ON 3/19/2021. SHE HAS BEEN KEEPING UP WITH HER MAMMOGRAM'S EVERY YEAR, SEE IN HER CHART.     DOES SHE STILL NEED TO F/U WITH OUR OFFICE?     CALL HER TO ADVISE  "

## 2024-04-26 NOTE — TELEPHONE ENCOUNTER
Discussed with Dr. Dinh. No need for patient to be seen unless something comes up clinically, she has an abnormal mammo, etc. Pt v/u and she will continue to f/u with her PCP. Gabbie Chaves RN

## 2024-08-09 ENCOUNTER — TELEPHONE (OUTPATIENT)
Dept: FAMILY MEDICINE CLINIC | Facility: CLINIC | Age: 69
End: 2024-08-09
Payer: MEDICARE

## 2024-08-09 DIAGNOSIS — Z78.0 POSTMENOPAUSAL: Primary | ICD-10-CM

## 2024-08-09 NOTE — TELEPHONE ENCOUNTER
"  Caller: Thuy Blackwood \"Neil\"    Relationship: Self    Best call back number: [phone]*    What orders are you requesting (i.e. lab or imaging): BONE DENSITY     In what timeframe would the patient need to come in: DUE NOW    Where will you receive your lab/imaging services: [facility]. FAX#: [phone]    Additional notes: PATIENT CALLING REQUESTING BONE DENSITY ORDER TO BE FAXED TO [facility] FOR SCHEDULING.        "

## 2024-08-09 NOTE — TELEPHONE ENCOUNTER
Spoke with patient to notified her per her provider   Order placed but it cannot be scheduled   until after September 8.   Patient voiced understanding and had no further question

## 2024-08-22 ENCOUNTER — TELEPHONE (OUTPATIENT)
Dept: FAMILY MEDICINE CLINIC | Facility: CLINIC | Age: 69
End: 2024-08-22
Payer: MEDICARE

## 2024-08-22 NOTE — TELEPHONE ENCOUNTER
HUB TO RELAY    Pt not seen by PCP since Nov 2023. For referral request pt is to be seen in office. Pt requires office visit. Please sundeep.

## 2024-08-22 NOTE — TELEPHONE ENCOUNTER
"Caller: Thuy Blackwood \"Neil\"    Relationship: Self    Best call back number: [phone]     What is the medical concern/diagnosis: BURSITIS IN LEFT HIP IS WORSENING    What specialty or service is being requested: ORTHOPEDICS    What is the provider, practice or medical service name:     What is the office location:     What is the office phone number:     Any additional details: PATIENT STATES THEY HAVE BURSITIS IN BOTH HIPS. THE LEFT IS GETTING WORSE AND SHE IS HAVING TROUBLE WALKING AND GOING UP AND DOWN STEPS. PAIN IS INCREASED, AS WELL.        "

## 2024-09-06 ENCOUNTER — OFFICE VISIT (OUTPATIENT)
Dept: FAMILY MEDICINE CLINIC | Facility: CLINIC | Age: 69
End: 2024-09-06
Payer: MEDICARE

## 2024-09-06 VITALS
DIASTOLIC BLOOD PRESSURE: 72 MMHG | BODY MASS INDEX: 23.36 KG/M2 | OXYGEN SATURATION: 98 % | TEMPERATURE: 96.9 F | WEIGHT: 140.2 LBS | SYSTOLIC BLOOD PRESSURE: 162 MMHG | HEART RATE: 78 BPM | HEIGHT: 65 IN

## 2024-09-06 DIAGNOSIS — E78.5 HYPERLIPIDEMIA, UNSPECIFIED HYPERLIPIDEMIA TYPE: ICD-10-CM

## 2024-09-06 DIAGNOSIS — M70.61 TROCHANTERIC BURSITIS OF BOTH HIPS: Primary | ICD-10-CM

## 2024-09-06 DIAGNOSIS — M70.62 TROCHANTERIC BURSITIS OF BOTH HIPS: Primary | ICD-10-CM

## 2024-09-06 PROCEDURE — 99214 OFFICE O/P EST MOD 30 MIN: CPT | Performed by: NURSE PRACTITIONER

## 2024-09-06 PROCEDURE — 1159F MED LIST DOCD IN RCRD: CPT | Performed by: NURSE PRACTITIONER

## 2024-09-06 PROCEDURE — 1125F AMNT PAIN NOTED PAIN PRSNT: CPT | Performed by: NURSE PRACTITIONER

## 2024-09-06 PROCEDURE — 1160F RVW MEDS BY RX/DR IN RCRD: CPT | Performed by: NURSE PRACTITIONER

## 2024-09-06 RX ORDER — PREDNISONE 20 MG/1
TABLET ORAL
Qty: 20 TABLET | Refills: 0 | Status: SHIPPED | OUTPATIENT
Start: 2024-09-06

## 2024-09-06 NOTE — PROGRESS NOTES
Subjective   Thuy Blackwood is a 69 y.o. female.     History of Present Illness   Answers submitted by the patient for this visit:  Primary Reason for Visit (Submitted on 8/31/2024)  What is the primary reason for your visit?: Extremity Pain  Lower Extremity Injury Questionnaire (Submitted on 8/31/2024)  Chief Complaint: Extremity pain  Injury: No  Patient has history of bursitis in both hips and has been having worsening pain in her left hip.  Reports trouble walking and going up and down steps.  She would like to have referral to an orthopedic specialist for further evaluation and treatment.  Saw Dr. Villagomez in 2016 but he has since retired.    The following portions of the patient's history were reviewed and updated as appropriate: allergies, current medications, past family history, past medical history, past social history, past surgical history, and problem list.    Review of Systems   Constitutional:  Negative for unexpected weight change.   Respiratory:  Negative for shortness of breath.    Cardiovascular:  Negative for chest pain and palpitations.   Musculoskeletal:  Positive for arthralgias and joint swelling.   Neurological:  Negative for numbness.   Psychiatric/Behavioral:  Negative for behavioral problems.        Objective   Physical Exam  Vitals and nursing note reviewed.   Constitutional:       Appearance: Normal appearance. She is well-developed.   Pulmonary:      Effort: Pulmonary effort is normal.   Musculoskeletal:      Right hip: Tenderness present.      Left hip: Tenderness present.        Legs:    Neurological:      Mental Status: She is alert and oriented to person, place, and time.   Psychiatric:         Mood and Affect: Mood normal.         Behavior: Behavior normal.         Thought Content: Thought content normal.         Judgment: Judgment normal.         Assessment & Plan   Diagnoses and all orders for this visit:    1. Trochanteric bursitis of both hips (Primary)  -     Ibuprofen 3 %,  Gabapentin 10 %, Baclofen 2 %, lidocaine 4 %; Apply 1-2 g topically to the appropriate area as directed 3 (Three) to 4 (Four) times daily.  Dispense: 90 g; Refill: 1  -     Ambulatory Referral to Orthopedic Surgery  -     predniSONE (DELTASONE) 20 MG tablet; Take 3 tabs QDPC x 3 days then 2 tabs QDPC x 4 days then 1 tab QDPC x 3 days  Dispense: 20 tablet; Refill: 0    2. Hyperlipidemia, unspecified hyperlipidemia type  -     Comprehensive metabolic panel  -     Lipid panel  -     CBC and Differential  -     TSH          Will schedule medicare wellness for November.  Will get labs prior.

## 2024-09-27 ENCOUNTER — APPOINTMENT (OUTPATIENT)
Dept: WOMENS IMAGING | Facility: HOSPITAL | Age: 69
End: 2024-09-27
Payer: MEDICARE

## 2024-09-27 ENCOUNTER — HOSPITAL ENCOUNTER (OUTPATIENT)
Dept: PET IMAGING | Facility: HOSPITAL | Age: 69
Discharge: HOME | End: 2024-09-27
Payer: MEDICARE

## 2024-09-27 DIAGNOSIS — Z78.0 POSTMENOPAUSAL: ICD-10-CM

## 2024-09-27 PROCEDURE — 77080 DXA BONE DENSITY AXIAL: CPT

## 2024-11-15 LAB
ALBUMIN SERPL-MCNC: 4 G/DL (ref 3.5–5.2)
ALBUMIN/GLOB SERPL: 1.7 G/DL
ALP SERPL-CCNC: 60 U/L (ref 39–117)
ALT SERPL-CCNC: 16 U/L (ref 1–33)
AST SERPL-CCNC: 22 U/L (ref 1–32)
BASOPHILS # BLD AUTO: 0.03 10*3/MM3 (ref 0–0.2)
BASOPHILS NFR BLD AUTO: 0.7 % (ref 0–1.5)
BILIRUB SERPL-MCNC: 0.5 MG/DL (ref 0–1.2)
BUN SERPL-MCNC: 19 MG/DL (ref 8–23)
BUN/CREAT SERPL: 22.6 (ref 7–25)
CALCIUM SERPL-MCNC: 9.2 MG/DL (ref 8.6–10.5)
CHLORIDE SERPL-SCNC: 103 MMOL/L (ref 98–107)
CHOLEST SERPL-MCNC: 206 MG/DL (ref 0–200)
CO2 SERPL-SCNC: 24.5 MMOL/L (ref 22–29)
CREAT SERPL-MCNC: 0.84 MG/DL (ref 0.57–1)
EGFRCR SERPLBLD CKD-EPI 2021: 74.4 ML/MIN/1.73
EOSINOPHIL # BLD AUTO: 0.09 10*3/MM3 (ref 0–0.4)
EOSINOPHIL NFR BLD AUTO: 2.1 % (ref 0.3–6.2)
ERYTHROCYTE [DISTWIDTH] IN BLOOD BY AUTOMATED COUNT: 12.9 % (ref 12.3–15.4)
GLOBULIN SER CALC-MCNC: 2.3 GM/DL
GLUCOSE SERPL-MCNC: 83 MG/DL (ref 65–99)
HCT VFR BLD AUTO: 40.1 % (ref 34–46.6)
HDLC SERPL-MCNC: 59 MG/DL (ref 40–60)
HGB BLD-MCNC: 12.8 G/DL (ref 12–15.9)
IMM GRANULOCYTES # BLD AUTO: 0.02 10*3/MM3 (ref 0–0.05)
IMM GRANULOCYTES NFR BLD AUTO: 0.5 % (ref 0–0.5)
LDLC SERPL CALC-MCNC: 133 MG/DL (ref 0–100)
LYMPHOCYTES # BLD AUTO: 0.96 10*3/MM3 (ref 0.7–3.1)
LYMPHOCYTES NFR BLD AUTO: 22.9 % (ref 19.6–45.3)
MCH RBC QN AUTO: 31.1 PG (ref 26.6–33)
MCHC RBC AUTO-ENTMCNC: 31.9 G/DL (ref 31.5–35.7)
MCV RBC AUTO: 97.3 FL (ref 79–97)
MONOCYTES # BLD AUTO: 0.59 10*3/MM3 (ref 0.1–0.9)
MONOCYTES NFR BLD AUTO: 14 % (ref 5–12)
NEUTROPHILS # BLD AUTO: 2.51 10*3/MM3 (ref 1.7–7)
NEUTROPHILS NFR BLD AUTO: 59.8 % (ref 42.7–76)
NRBC BLD AUTO-RTO: 0 /100 WBC (ref 0–0.2)
PLATELET # BLD AUTO: 323 10*3/MM3 (ref 140–450)
POTASSIUM SERPL-SCNC: 4.4 MMOL/L (ref 3.5–5.2)
PROT SERPL-MCNC: 6.3 G/DL (ref 6–8.5)
RBC # BLD AUTO: 4.12 10*6/MM3 (ref 3.77–5.28)
SODIUM SERPL-SCNC: 140 MMOL/L (ref 136–145)
TRIGL SERPL-MCNC: 80 MG/DL (ref 0–150)
TSH SERPL DL<=0.005 MIU/L-ACNC: 2.06 UIU/ML (ref 0.27–4.2)
VLDLC SERPL CALC-MCNC: 14 MG/DL (ref 5–40)
WBC # BLD AUTO: 4.2 10*3/MM3 (ref 3.4–10.8)

## 2024-11-22 ENCOUNTER — OFFICE VISIT (OUTPATIENT)
Dept: FAMILY MEDICINE CLINIC | Facility: CLINIC | Age: 69
End: 2024-11-22
Payer: MEDICARE

## 2024-11-22 VITALS
HEIGHT: 65 IN | TEMPERATURE: 97.6 F | RESPIRATION RATE: 16 BRPM | WEIGHT: 139.9 LBS | HEART RATE: 80 BPM | BODY MASS INDEX: 23.31 KG/M2 | DIASTOLIC BLOOD PRESSURE: 66 MMHG | SYSTOLIC BLOOD PRESSURE: 138 MMHG | OXYGEN SATURATION: 99 %

## 2024-11-22 DIAGNOSIS — Z00.00 MEDICARE ANNUAL WELLNESS VISIT, SUBSEQUENT: Primary | ICD-10-CM

## 2024-11-22 NOTE — PATIENT INSTRUCTIONS
Medicare Wellness  Personal Prevention Plan of Service     Date of Office Visit:    Encounter Provider:  JESÚS Becker  Place of Service:  [facility]  Patient Name: Thuy Blackwood  :  1955    As part of the Medicare Wellness portion of your visit today, we are providing you with this personalized preventive plan of services (PPPS). This plan is based upon recommendations of the [place] Preventive Services Task Force (USPSTF) and the Advisory Committee on Immunization Practices (ACIP).    This lists the preventive care services that should be considered, and provides dates of when you are due. Items listed as completed are up-to-date and do not require any further intervention.    Health Maintenance   Topic Date Due    COVID-19 Vaccine (2024- season) 2024    LIPID PANEL  2025    ANNUAL WELLNESS VISIT  2025    MAMMOGRAM  2026    DXA SCAN  2026    TDAP/TD VACCINES (2 - Td or Tdap) 2027    COLORECTAL CANCER SCREENING  2031    INFLUENZA VACCINE  Completed    Pneumococcal Vaccine 65+  Completed    ZOSTER VACCINE  Addressed    HEPATITIS C SCREENING  Discontinued    PAP SMEAR  Discontinued       No orders of the defined types were placed in this encounter.      Return in about 1 year (around 2025) for Next scheduled follow up.

## 2024-11-22 NOTE — PROGRESS NOTES
Subjective   The ABCs of the Annual Wellness Visit  Medicare Wellness Visit      Thuy Blackwood is a 69 y.o. patient who presents for a Medicare Wellness Visit.    She no longer gets PAPs.    She is UTD on mammogram, DEXA scan and colonoscopy.     She has hx of breast cancer but is 32 years cancer free so no longer sees her oncologist.     The following portions of the patient's history were reviewed and   updated as appropriate: allergies, current medications, past family history, past medical history, past social history, past surgical history, and problem list.    Compared to one year ago, the patient's physical   health is the same.  Compared to one year ago, the patient's mental   health is the same.    Recent Hospitalizations:  She was not admitted to the hospital during the last year.     Current Medical Providers:  Patient Care Team:  Cecilia Garza APRN ([place]) as PCP - General (Family Medicine)  Larry Allan MD as Referring Physician (Family Medicine)  Vicky Adan MD as Consulting Physician (Hematology and Oncology)    Outpatient Medications Prior to Visit   Medication Sig Dispense Refill    cholecalciferol (VITAMIN D3) 1000 UNITS tablet Take 1 tablet by mouth Every Other Day.      CINNAMON PO Take 1,000 mg by mouth 2 (two) times a day.      COCONUT OIL PO Take 1,000 mg by mouth 2 (two) times a day.      Ibuprofen 3 %, Gabapentin 10 %, Baclofen 2 %, lidocaine 4 % Apply 1-2 g topically to the appropriate area as directed 3 (Three) to 4 (Four) times daily. 90 g 1    MAGNESIUM-POTASSIUM PO Take 700 mg by mouth Every Night.      Multiple Vitamin (MULTI-VITAMIN DAILY PO) Take 1 tablet by mouth Daily.      Probiotic Product (PROBIOTIC PO) Take  by mouth.      promethazine (PHENERGAN) 25 MG tablet Take 1 tablet by mouth Every 8 (Eight) Hours As Needed for Nausea or Vomiting. 30 tablet 5    sodium bicarbonate 650 MG tablet Take 1 tablet by mouth 2 (Two) Times a Day. 180 tablet 3    vitamin C (ASCORBIC  "ACID) 250 MG tablet Take 1 tablet by mouth Daily.      zinc sulfate (ZINCATE) 220 (50 Zn) MG capsule Take 1 capsule by mouth Daily.      baclofen (LIORESAL) 10 MG tablet Take 1 tablet by mouth every night at bedtime. (Patient not taking: Reported on 11/22/2024) 90 tablet 3    predniSONE (DELTASONE) 20 MG tablet Take 3 tabs QDPC x 3 days then 2 tabs QDPC x 4 days then 1 tab QDPC x 3 days (Patient not taking: Reported on 11/22/2024) 20 tablet 0     No facility-administered medications prior to visit.     No opioid medication identified on active medication list. I have reviewed chart for other potential  high risk medication/s and harmful drug interactions in the elderly.      Aspirin is not on active medication list.  Aspirin use is not indicated based on review of current medical condition/s. Risk of harm outweighs potential benefits.  .    Patient Active Problem List   Diagnosis    Hyperlipidemia    Osteopenia of multiple sites    Personal history of malignant neoplasm of breast    Urinary tract infection with hematuria    Lower abdominal pain    Diarrhea, unspecified    Duodenal diverticulum    Functional diarrhea    Internal hemorrhoids    Acute superficial gastritis without hemorrhage    Pyelonephritis    Microscopic colitis    Nausea & vomiting    Rigors    Sepsis, unspecified organism    Fever    Hematuria     Advance Care Planning Advance Directive is not on file.              Objective   Vitals:    11/22/24 1011   BP: 138/66   Pulse: 80   Resp: 16   Temp: 97.6 °F (36.4 °C)   SpO2: 99%   Weight: 63.5 kg (139 lb 14.4 oz)   Height: 165.1 cm (65\")   PainSc: 0-No pain       Estimated body mass index is 23.28 kg/m² as calculated from the following:    Height as of this encounter: 165.1 cm (65\").    Weight as of this encounter: 63.5 kg (139 lb 14.4 oz).    BMI is within normal parameters. No other follow-up for BMI required.       Does the patient have evidence of cognitive impairment? No  Lab Results   Component " Value Date    CHLPL 206 (H) 2024    TRIG 80 2024    HDL 59 2024     (H) 2024    VLDL 14 2024                                                                                               Health  Risk Assessment    Smoking Status:  Social History     Tobacco Use   Smoking Status Never   Smokeless Tobacco Never     Alcohol Consumption:  Social History     Substance and Sexual Activity   Alcohol Use Yes    Alcohol/week: 3.0 standard drinks of alcohol    Types: 2 Glasses of wine, 1 Cans of beer per week    Comment: occasional       Fall Risk Screen  DAVIDADI Fall Risk Assessment was completed, and patient is at LOW risk for falls.Assessment completed on:2024    Depression Screening   Little interest or pleasure in doing things? Not at all   Feeling down, depressed, or hopeless? Not at all   PHQ-2 Total Score 0      Health Habits and Functional and Cognitive Screenin/22/2024    10:00 AM   Functional & Cognitive Status   Do you have difficulty preparing food and eating? No   Do you have difficulty bathing yourself, getting dressed or grooming yourself? No   Do you have difficulty using the toilet? No   Do you have difficulty moving around from place to place? No   Do you have trouble with steps or getting out of a bed or a chair? No   Current Diet Well Balanced Diet   Dental Exam Up to date   Eye Exam Up to date   Exercise (times per week) 0 times per week   Current Exercises Include No Regular Exercise   Do you need help using the phone?  No   Are you deaf or do you have serious difficulty hearing?  No   Do you need help to go to places out of walking distance? No   Do you need help shopping? No   Do you need help preparing meals?  No   Do you need help with housework?  No   Do you need help with laundry? No   Do you need help taking your medications? No   Do you need help managing money? No   Do you ever drive or ride in a car without wearing a seat belt? No   Have you  felt unusual stress, anger or loneliness in the last month? No   Who do you live with? Spouse   If you need help, do you have trouble finding someone available to you? No   Have you been bothered in the last four weeks by sexual problems? No   Do you have difficulty concentrating, remembering or making decisions? No           Age-appropriate Screening Schedule:  Refer to the list below for future screening recommendations based on patient's age, sex and/or medical conditions. Orders for these recommended tests are listed in the plan section. The patient has been provided with a written plan.    Health Maintenance List  Health Maintenance   Topic Date Due    COVID-19 Vaccine (6 - 2024-25 season) 09/01/2024    LIPID PANEL  11/14/2025    ANNUAL WELLNESS VISIT  11/22/2025    MAMMOGRAM  09/27/2026    DXA SCAN  09/27/2026    TDAP/TD VACCINES (2 - Td or Tdap) 05/09/2027    COLORECTAL CANCER SCREENING  04/29/2031    INFLUENZA VACCINE  Completed    Pneumococcal Vaccine 65+  Completed    ZOSTER VACCINE  Addressed    HEPATITIS C SCREENING  Discontinued    PAP SMEAR  Discontinued                                                                                                                                                CMS Preventative Services Quick Reference  Risk Factors Identified During Encounter  None Identified    The above risks/problems have been discussed with the patient.  Pertinent information has been shared with the patient in the After Visit Summary.  An After Visit Summary and PPPS were made available to the patient.    Follow Up:   Next Medicare Wellness visit to be scheduled in 1 year.     Assessment & Plan         Follow Up:   No follow-ups on file.    Labs reviewed with pt today during visit. All questions answered.

## 2024-11-25 DIAGNOSIS — N28.9 RENAL INSUFFICIENCY: ICD-10-CM

## 2024-11-25 DIAGNOSIS — R11.0 NAUSEA: ICD-10-CM

## 2024-11-25 RX ORDER — BACLOFEN 10 MG/1
10 TABLET ORAL
Qty: 90 TABLET | Refills: 3 | Status: SHIPPED | OUTPATIENT
Start: 2024-11-25

## 2024-11-25 RX ORDER — PROMETHAZINE HYDROCHLORIDE 25 MG/1
25 TABLET ORAL EVERY 8 HOURS PRN
Qty: 30 TABLET | Refills: 5 | Status: SHIPPED | OUTPATIENT
Start: 2024-11-25

## 2024-11-25 RX ORDER — SODIUM BICARBONATE 650 MG/1
650 TABLET ORAL 2 TIMES DAILY
Qty: 180 TABLET | Refills: 3 | Status: SHIPPED | OUTPATIENT
Start: 2024-11-25

## 2024-11-25 NOTE — TELEPHONE ENCOUNTER
"  Caller: Merced Thuy VALENCIA \"Neil\"    Relationship: Self    Best call back number: [phone]*    Requested Prescriptions:   Requested Prescriptions     Pending Prescriptions Disp Refills    sodium bicarbonate 650 MG tablet 180 tablet 3     Sig: Take 1 tablet by mouth 2 (Two) Times a Day.    promethazine (PHENERGAN) 25 MG tablet 30 tablet 5     Sig: Take 1 tablet by mouth Every 8 (Eight) Hours As Needed for Nausea or Vomiting.        Pharmacy where request should be sent: [facility] [id number] - [phone]  - [phone] FX     Last office visit with prescribing clinician: 11/22/2024   Last telemedicine visit with prescribing clinician: Visit date not found   Next office visit with prescribing clinician: 11/24/2025     Additional details provided by patient: REQUEST REFILLS    Does the patient have less than a 3 day supply:  [] Yes  [x] No    Would you like a call back once the refill request has been completed: [] Yes [x] No    If the office needs to give you a call back, can they leave a voicemail: [] Yes [x] No    Ranjit Zamudio   11/25/24 08:26 EST         "

## 2024-12-03 NOTE — PROGRESS NOTES
Chief Complaint  No chief complaint on file.  Follow-up on varicose veins after ablation procedure    Subjective        Thuy Blackwood presents to [facility] VASCULAR SURGERY  History of Present IllnessThe patient is status post Varithena chemical ablation of multiple extension and tributary varices of the right lower extremity on November 17, 2023 and on the left lower extremity on December 1, 2023.  Initial follow-up scans have demonstrated successful closure of the veins with no DVT.  She has had significant decompression of her varices.  She returned on April 9, 2024 with her 4 month follow-up.  She was very pleased with her results with marked decompression of her varices.  She had mild neovascularization.  She returned on December 10, 2024 for 1 year follow-up.  She has actually developed recurrent varices on the right from the upper thigh to the lower calf region primarily medially.  She has some discomfort associated with this.    Past History:  Medical History: has a past medical history of Acute sinusitis, Anemia, Anesthesia complication, Arthritis, Breast cancer, Cholelithiasis, Colon cancer screening (04/11/2019), Diverticulosis, H/O bone density study (08/2016), H/O Breast cancer, H/O complete eye exam (2014), Hyperlipidemia, Osteopenia, Personal history of malignant neoplasm of breast, PONV (postoperative nausea and vomiting), Renal insufficiency, Unspecified kidney failure, UTI (urinary tract infection), Varicose veins of bilateral lower extremities with pain, and Venous insufficiency (chronic) (peripheral).   Surgical History: has a past surgical history that includes Bladder suspension; Hand surgery; Mastectomy (Left, 1992); Mammo bilateral (08/2016); Pap Smear (scheduled); Hysterectomy (1995); Tubal ligation (1986); endoscopy and colonoscopy (06/2010); Laparoscopic cholecystectomy (N/A, 06/28/2010); Colonoscopy (N/A, 04/29/2021); Esophagogastroduodenoscopy (N/A, 04/29/2021);  "Bladder repair; Breast surgery; Eye surgery; and Subtotal Hysterectomy.   Family History: family history includes Arthritis in her mother; Cancer in an other family member; Depression in an other family member; Diabetes in her father; Heart attack in an other family member; Heart disease in her father, mother, and another family member; Hypertension in an other family member; Rheum arthritis in an other family member.   Social History: reports that she has never smoked. She has never used smokeless tobacco. She reports current alcohol use of about 3.0 standard drinks of alcohol per week. She reports that she does not use drugs.    (Not in a hospital admission)     Allergies: Doxycycline hyclate and Levaquin [levofloxacin]   Objective   Vital Signs:  There were no vitals taken for this visit.  Estimated body mass index is 23.28 kg/m² as calculated from the following:    Height as of 11/22/24: 165.1 cm (65\").    Weight as of 11/22/24: 63.5 kg (139 lb 14.4 oz).     BMI is within normal parameters. No other follow-up for BMI required.    Thuy Ottoerty  reports that she has never smoked. She has never used smokeless tobacco.         Physical Exam only a few small reticular varices and spider telangiectasia on the left.  On the right varices beginning in the upper anterior thigh and coursing medially to the knee and below into the calf as large as 1.5 cm in diameter.  Result Review :                     Assessment and Plan     Diagnoses and all orders for this visit:    1. Varicose veins of leg with pain, bilateral (Primary)    2. Venous (peripheral) insufficiency    She had marked improvement in her symptoms on the left after her previous procedures.  Unfortunately, she has developed some recurrent varices on the right.  She has questions whether or not something needed to be done about this.  She is having symptoms related to these.  I discussed the possibility of further chemical ablation with Varithena as an " option.  Since is close to the holidays she wants to think about this and give us a call back.  I told her we would proceed with getting approval in the interim.  Once again, the risks and benefits of the procedure were discussed in detail with her.  Risks include but not limited to bleeding, DVT, pulmonary embolism, death.  Even though these are small these are real.  She understands and would like for us to proceed with approval.         Follow Up     No follow-ups on file.  Patient was given instructions and counseling regarding her condition or for health maintenance advice. Please see specific information pulled into the AVS if appropriate.

## 2024-12-10 ENCOUNTER — OFFICE VISIT (OUTPATIENT)
Age: 69
End: 2024-12-10
Payer: MEDICARE

## 2024-12-10 VITALS
SYSTOLIC BLOOD PRESSURE: 140 MMHG | TEMPERATURE: 97.5 F | HEART RATE: 79 BPM | BODY MASS INDEX: 24.09 KG/M2 | OXYGEN SATURATION: 98 % | WEIGHT: 144.6 LBS | HEIGHT: 65 IN | DIASTOLIC BLOOD PRESSURE: 80 MMHG

## 2024-12-10 DIAGNOSIS — I83.813 VARICOSE VEINS OF LEG WITH PAIN, BILATERAL: Primary | ICD-10-CM

## 2024-12-10 DIAGNOSIS — I87.2 VENOUS (PERIPHERAL) INSUFFICIENCY: ICD-10-CM

## 2024-12-21 ENCOUNTER — APPOINTMENT (OUTPATIENT)
Dept: CT IMAGING | Facility: HOSPITAL | Age: 69
End: 2024-12-21
Payer: MEDICARE

## 2024-12-21 ENCOUNTER — HOSPITAL ENCOUNTER (EMERGENCY)
Facility: HOSPITAL | Age: 69
Discharge: HOME | End: 2024-12-21
Attending: STUDENT IN AN ORGANIZED HEALTH CARE EDUCATION/TRAINING PROGRAM
Payer: MEDICARE

## 2024-12-21 VITALS
TEMPERATURE: 98.1 F | OXYGEN SATURATION: 99 % | RESPIRATION RATE: 14 BRPM | HEART RATE: 99 BPM | SYSTOLIC BLOOD PRESSURE: 187 MMHG | DIASTOLIC BLOOD PRESSURE: 90 MMHG

## 2024-12-21 DIAGNOSIS — K52.9 GASTROENTERITIS: Primary | ICD-10-CM

## 2024-12-21 LAB
ALBUMIN SERPL-MCNC: 4.2 G/DL (ref 3.5–5.2)
ALBUMIN/GLOB SERPL: 1.6 G/DL
ALP SERPL-CCNC: 61 U/L (ref 39–117)
ALT SERPL W P-5'-P-CCNC: 17 U/L (ref 1–33)
ANION GAP SERPL CALCULATED.3IONS-SCNC: 12 MMOL/L (ref 5–15)
AST SERPL-CCNC: 22 U/L (ref 1–32)
BACTERIA UR QL AUTO: ABNORMAL /HPF
BASOPHILS # BLD AUTO: 0.02 10*3/MM3 (ref 0–0.2)
BASOPHILS NFR BLD AUTO: 0.2 % (ref 0–1.5)
BILIRUB SERPL-MCNC: 0.6 MG/DL (ref 0–1.2)
BILIRUB UR QL STRIP: NEGATIVE
BUN SERPL-MCNC: 23 MG/DL (ref 8–23)
BUN/CREAT SERPL: 23.7 (ref 7–25)
CALCIUM SPEC-SCNC: 9.8 MG/DL (ref 8.6–10.5)
CHLORIDE SERPL-SCNC: 104 MMOL/L (ref 98–107)
CLARITY UR: CLEAR
CO2 SERPL-SCNC: 23 MMOL/L (ref 22–29)
COLOR UR: ABNORMAL
CREAT SERPL-MCNC: 0.97 MG/DL (ref 0.57–1)
DEPRECATED RDW RBC AUTO: 43.6 FL (ref 37–54)
EGFRCR SERPLBLD CKD-EPI 2021: 62.6 ML/MIN/1.73
EOSINOPHIL # BLD AUTO: 0.04 10*3/MM3 (ref 0–0.4)
EOSINOPHIL NFR BLD AUTO: 0.4 % (ref 0.3–6.2)
ERYTHROCYTE [DISTWIDTH] IN BLOOD BY AUTOMATED COUNT: 12.3 % (ref 12.3–15.4)
GLOBULIN UR ELPH-MCNC: 2.7 GM/DL
GLUCOSE SERPL-MCNC: 103 MG/DL (ref 65–99)
GLUCOSE UR STRIP-MCNC: NEGATIVE MG/DL
HCT VFR BLD AUTO: 39.5 % (ref 34–46.6)
HGB BLD-MCNC: 13.1 G/DL (ref 12–15.9)
HGB UR QL STRIP.AUTO: NEGATIVE
HOLD SPECIMEN: NORMAL
HOLD SPECIMEN: NORMAL
HYALINE CASTS UR QL AUTO: ABNORMAL /LPF
IMM GRANULOCYTES # BLD AUTO: 0.02 10*3/MM3 (ref 0–0.05)
IMM GRANULOCYTES NFR BLD AUTO: 0.2 % (ref 0–0.5)
KETONES UR QL STRIP: ABNORMAL
LEUKOCYTE ESTERASE UR QL STRIP.AUTO: ABNORMAL
LIPASE SERPL-CCNC: 37 U/L (ref 13–60)
LYMPHOCYTES # BLD AUTO: 0.36 10*3/MM3 (ref 0.7–3.1)
LYMPHOCYTES NFR BLD AUTO: 3.7 % (ref 19.6–45.3)
MCH RBC QN AUTO: 31.8 PG (ref 26.6–33)
MCHC RBC AUTO-ENTMCNC: 33.2 G/DL (ref 31.5–35.7)
MCV RBC AUTO: 95.9 FL (ref 79–97)
MONOCYTES # BLD AUTO: 0.52 10*3/MM3 (ref 0.1–0.9)
MONOCYTES NFR BLD AUTO: 5.3 % (ref 5–12)
NEUTROPHILS NFR BLD AUTO: 8.84 10*3/MM3 (ref 1.7–7)
NEUTROPHILS NFR BLD AUTO: 90.2 % (ref 42.7–76)
NITRITE UR QL STRIP: NEGATIVE
NRBC BLD AUTO-RTO: 0 /100 WBC (ref 0–0.2)
PH UR STRIP.AUTO: 7 [PH] (ref 5–8)
PLATELET # BLD AUTO: 287 10*3/MM3 (ref 140–450)
PMV BLD AUTO: 9.5 FL (ref 6–12)
POTASSIUM SERPL-SCNC: 4 MMOL/L (ref 3.5–5.2)
PROT SERPL-MCNC: 6.9 G/DL (ref 6–8.5)
PROT UR QL STRIP: ABNORMAL
RBC # BLD AUTO: 4.12 10*6/MM3 (ref 3.77–5.28)
RBC # UR STRIP: ABNORMAL /HPF
REF LAB TEST METHOD: ABNORMAL
SODIUM SERPL-SCNC: 139 MMOL/L (ref 136–145)
SP GR UR STRIP: 1.03 (ref 1–1.03)
SQUAMOUS #/AREA URNS HPF: ABNORMAL /HPF
UROBILINOGEN UR QL STRIP: ABNORMAL
WBC # UR STRIP: ABNORMAL /HPF
WBC NRBC COR # BLD AUTO: 9.8 10*3/MM3 (ref 3.4–10.8)
WHOLE BLOOD HOLD COAG: NORMAL

## 2024-12-21 PROCEDURE — 25810000003 LACTATED RINGERS SOLUTION: Performed by: STUDENT IN AN ORGANIZED HEALTH CARE EDUCATION/TRAINING PROGRAM

## 2024-12-21 PROCEDURE — 81001 URINALYSIS AUTO W/SCOPE: CPT | Performed by: STUDENT IN AN ORGANIZED HEALTH CARE EDUCATION/TRAINING PROGRAM

## 2024-12-21 PROCEDURE — 74177 CT ABD & PELVIS W/CONTRAST: CPT

## 2024-12-21 PROCEDURE — 99285 EMERGENCY DEPT VISIT HI MDM: CPT

## 2024-12-21 PROCEDURE — 25510000001 IOPAMIDOL 61 % SOLUTION: Performed by: STUDENT IN AN ORGANIZED HEALTH CARE EDUCATION/TRAINING PROGRAM

## 2024-12-21 PROCEDURE — 85025 COMPLETE CBC W/AUTO DIFF WBC: CPT | Performed by: STUDENT IN AN ORGANIZED HEALTH CARE EDUCATION/TRAINING PROGRAM

## 2024-12-21 PROCEDURE — 83690 ASSAY OF LIPASE: CPT | Performed by: STUDENT IN AN ORGANIZED HEALTH CARE EDUCATION/TRAINING PROGRAM

## 2024-12-21 PROCEDURE — 80053 COMPREHEN METABOLIC PANEL: CPT | Performed by: STUDENT IN AN ORGANIZED HEALTH CARE EDUCATION/TRAINING PROGRAM

## 2024-12-21 RX ORDER — IOPAMIDOL 612 MG/ML
100 INJECTION, SOLUTION INTRAVASCULAR
Status: COMPLETED | OUTPATIENT
Start: 2024-12-21 | End: 2024-12-21

## 2024-12-21 RX ORDER — ONDANSETRON 4 MG/1
4 TABLET, ORALLY DISINTEGRATING ORAL 4 TIMES DAILY PRN
Qty: 30 TABLET | Refills: 0 | Status: SHIPPED | OUTPATIENT
Start: 2024-12-21

## 2024-12-21 RX ORDER — ONDANSETRON 4 MG/1
4 TABLET, ORALLY DISINTEGRATING ORAL 4 TIMES DAILY PRN
Qty: 30 TABLET | Refills: 0 | Status: SHIPPED | OUTPATIENT
Start: 2024-12-21 | End: 2024-12-21

## 2024-12-21 RX ADMIN — IOPAMIDOL 85 ML: 612 INJECTION, SOLUTION INTRAVENOUS at 17:34

## 2024-12-21 RX ADMIN — SODIUM CHLORIDE, SODIUM LACTATE, POTASSIUM CHLORIDE, CALCIUM CHLORIDE 1000 ML: 20; 30; 600; 310 INJECTION, SOLUTION INTRAVENOUS at 16:28

## 2024-12-21 NOTE — Clinical Note
[facility] EMERGENCY DEPARTMENT  [address] [facility] [address]  Phone: [phone]    Thuy Blackwood was seen and treated in our emergency department on 12/21/2024.  She may return to work on 12/28/2024.         Thank you for choosing [facility].    Van Oneal MD       Awake

## 2024-12-21 NOTE — Clinical Note
[facility] EMERGENCY DEPARTMENT  [address] [facility] [address]  Phone: [phone]    Thuy Blackwood was seen and treated in our emergency department on 12/21/2024.  She may return to work on 12/28/2024.         Thank you for choosing [facility].    Van Oneal MD

## 2024-12-21 NOTE — ED PROVIDER NOTES
EMERGENCY DEPARTMENT ENCOUNTER  Room Number:  21/21  PCP: Cecilia Garza APRN ([place])  Independent Historians: Patient      HPI:  Chief Complaint: had concerns including Abdominal Pain.     Context: Thuy Blackwood is a 69 y.o. female with a medical history of HLD, duodenal diverticula who presents to the ED c/o acute nausea, vomiting and diarrhea for 2 weeks.  Symptoms of worsened today.  Patient endorses lower quadrant abdominal discomfort.  Patient states vomitings been well-controlled which continues to have diarrhea.  Patient denies recent antibiotic use.  Patient has a history of cholecystectomy.      Review of prior external notes (non-ED) -and- Review of prior external test results outside of this encounter: Office visit from 11/22/2024 reviewed and notable for visit secondary to annual wellness visit.  Plan at that time was to continue current plans and follow-up in 1 year.    Prescription drug monitoring program review:         PAST MEDICAL HISTORY  Active Ambulatory Problems     Diagnosis Date Noted    Hyperlipidemia     Osteopenia of multiple sites     Personal history of malignant neoplasm of breast     Urinary tract infection with hematuria     Lower abdominal pain 03/19/2021    Diarrhea, unspecified 03/19/2021    Duodenal diverticulum 04/08/2021    Functional diarrhea 04/08/2021    Internal hemorrhoids 04/29/2021    Acute superficial gastritis without hemorrhage 04/29/2021    Pyelonephritis 05/19/2021    Microscopic colitis 05/19/2021    Nausea & vomiting 05/19/2021    Rigors 05/19/2021    Sepsis, unspecified organism 05/19/2021    Fever 05/19/2021    Hematuria 09/19/2021     Resolved Ambulatory Problems     Diagnosis Date Noted    Renal insufficiency     Acute sinusitis      Past Medical History:   Diagnosis Date    Anemia     Anesthesia complication     Arthritis     Breast cancer     Cholelithiasis     Colon cancer screening 04/11/2019    Diverticulosis     H/O bone density study 08/2016     H/O Breast cancer     H/O complete eye exam 2014    Osteopenia     PONV (postoperative nausea and vomiting)     Unspecified kidney failure     UTI (urinary tract infection)     Varicose veins of bilateral lower extremities with pain     Venous insufficiency (chronic) (peripheral)          PAST SURGICAL HISTORY  Past Surgical History:   Procedure Laterality Date    BLADDER REPAIR      BLADDER SUSPENSION      BREAST SURGERY      Breast cancer    COLONOSCOPY N/A 04/29/2021    Procedure: COLONOSCOPY to cecum and TI with cold bx;  Surgeon: Keke Yarbrough MD;  Location:  [facility];  Service: General;  Laterality: N/A;  pre - diarrhea, screening  post - internal hemorrhoids    ENDOSCOPY N/A 04/29/2021    Procedure: ESOPHAGOGASTRODUODENOSCOPY with cold bx;  Surgeon: Keke Yarbrough MD;  Location:  [facility];  Service: General;  Laterality: N/A;  pre - duodenal diverticulum, diarrhea  post - bile reflux. duodenum diverticuli, antrum gastritis    ENDOSCOPY AND COLONOSCOPY  06/2010    Upper and lower GI endoscopies performed by Dr. Lawton showed gastritis but no other pathological diagnosis made.    EYE SURGERY      HAND SURGERY      HYSTERECTOMY  1995    partial; ovaries in situ    LAPAROSCOPIC CHOLECYSTECTOMY N/A 06/28/2010    Dr. Shawn Agarwal    MAMMO BILATERAL  08/2016    [facility] does breast exams    MASTECTOMY Left 1992    PAP SMEAR  scheduled    p hyst . dr nora dozier    SUBTOTAL HYSTERECTOMY      TUBAL ABDOMINAL LIGATION  1986         FAMILY HISTORY  Family History   Problem Relation Age of Onset    Arthritis Mother     Heart disease Mother     Diabetes Father     Heart disease Father     Depression Other     Heart disease Other     Hypertension Other     Heart attack Other     Rheum arthritis Other     Cancer Other     Malig Hyperthermia Neg Hx          SOCIAL HISTORY  Social History     Socioeconomic History    Marital status:    Tobacco Use    Smoking status: Never    Smokeless  tobacco: Never   Vaping Use    Vaping status: Never Used   Substance and Sexual Activity    Alcohol use: Yes     Alcohol/week: 3.0 standard drinks of alcohol     Types: 2 Glasses of wine, 1 Cans of beer per week     Comment: occasional    Drug use: No    Sexual activity: Yes     Partners: Male         ALLERGIES  Doxycycline hyclate and Levaquin [levofloxacin]      REVIEW OF SYSTEMS  Review of Systems  Included in HPI  All systems reviewed and negative except for those discussed in HPI.      PHYSICAL EXAM    I have reviewed the triage vital signs and nursing notes.    ED Triage Vitals [12/21/24 1532]   Temp Heart Rate Resp BP SpO2   98.1 °F (36.7 °C) 93 14 -- 100 %      Temp src Heart Rate Source Patient Position BP Location FiO2 (%)   -- -- -- -- --       Physical Exam  GENERAL: alert, no acute distress  SKIN: Warm, dry  HENT: Normocephalic, atraumatic  EYES: no scleral icterus  CV: regular rhythm, regular rate  RESPIRATORY: normal effort, lungs clear  ABDOMEN: soft, mild tenderness palpation of bilateral lower quadrants  MUSCULOSKELETAL: no deformity  NEURO: alert, moves all extremities, follows commands            LAB RESULTS  Recent Results (from the past 24 hours)   Comprehensive Metabolic Panel    Collection Time: 12/21/24  4:24 PM    Specimen: Blood   Result Value Ref Range    Glucose 103 (H) 65 - 99 mg/dL    BUN 23 8 - 23 mg/dL    Creatinine 0.97 0.57 - 1.00 mg/dL    Sodium 139 136 - 145 mmol/L    Potassium 4.0 3.5 - 5.2 mmol/L    Chloride 104 98 - 107 mmol/L    CO2 23.0 22.0 - 29.0 mmol/L    Calcium 9.8 8.6 - 10.5 mg/dL    Total Protein 6.9 6.0 - 8.5 g/dL    Albumin 4.2 3.5 - 5.2 g/dL    ALT (SGPT) 17 1 - 33 U/L    AST (SGOT) 22 1 - 32 U/L    Alkaline Phosphatase 61 39 - 117 U/L    Total Bilirubin 0.6 0.0 - 1.2 mg/dL    Globulin 2.7 gm/dL    A/G Ratio 1.6 g/dL    BUN/Creatinine Ratio 23.7 7.0 - 25.0    Anion Gap 12.0 5.0 - 15.0 mmol/L    eGFR 62.6 >60.0 mL/min/1.73   Lipase    Collection Time: 12/21/24  4:24  PM    Specimen: Blood   Result Value Ref Range    Lipase 37 13 - 60 U/L   Urinalysis With Microscopic If Indicated (No Culture) - Urine, Clean Catch    Collection Time: 12/21/24  4:24 PM    Specimen: Urine, Clean Catch   Result Value Ref Range    Color, UA Dark Yellow (A) Yellow, Straw    Appearance, UA Clear Clear    pH, UA 7.0 5.0 - 8.0    Specific Gravity, UA 1.026 1.005 - 1.030    Glucose, UA Negative Negative    Ketones, UA 15 mg/dL (1+) (A) Negative    Bilirubin, UA Negative Negative    Blood, UA Negative Negative    Protein, UA Trace (A) Negative    Leuk Esterase, UA Small (1+) (A) Negative    Nitrite, UA Negative Negative    Urobilinogen, UA 1.0 E.U./dL 0.2 - 1.0 E.U./dL   CBC Auto Differential    Collection Time: 12/21/24  4:24 PM    Specimen: Blood   Result Value Ref Range    WBC 9.80 3.40 - 10.80 10*3/mm3    RBC 4.12 3.77 - 5.28 10*6/mm3    Hemoglobin 13.1 12.0 - 15.9 g/dL    Hematocrit 39.5 34.0 - 46.6 %    MCV 95.9 79.0 - 97.0 fL    MCH 31.8 26.6 - 33.0 pg    MCHC 33.2 31.5 - 35.7 g/dL    RDW 12.3 12.3 - 15.4 %    RDW-SD 43.6 37.0 - 54.0 fl    MPV 9.5 6.0 - 12.0 fL    Platelets 287 140 - 450 10*3/mm3    Neutrophil % 90.2 (H) 42.7 - 76.0 %    Lymphocyte % 3.7 (L) 19.6 - 45.3 %    Monocyte % 5.3 5.0 - 12.0 %    Eosinophil % 0.4 0.3 - 6.2 %    Basophil % 0.2 0.0 - 1.5 %    Immature Grans % 0.2 0.0 - 0.5 %    Neutrophils, Absolute 8.84 (H) 1.70 - 7.00 10*3/mm3    Lymphocytes, Absolute 0.36 (L) 0.70 - 3.10 10*3/mm3    Monocytes, Absolute 0.52 0.10 - 0.90 10*3/mm3    Eosinophils, Absolute 0.04 0.00 - 0.40 10*3/mm3    Basophils, Absolute 0.02 0.00 - 0.20 10*3/mm3    Immature Grans, Absolute 0.02 0.00 - 0.05 10*3/mm3    nRBC 0.0 0.0 - 0.2 /100 WBC   Gold Top - SST    Collection Time: 12/21/24  4:24 PM   Result Value Ref Range    Extra Tube Hold for add-ons.    Gray Top    Collection Time: 12/21/24  4:24 PM   Result Value Ref Range    Extra Tube Hold for add-ons.    Light Blue Top    Collection Time: 12/21/24   4:24 PM   Result Value Ref Range    Extra Tube Hold for add-ons.    Urinalysis, Microscopic Only - Urine, Clean Catch    Collection Time: 12/21/24  4:24 PM    Specimen: Urine, Clean Catch   Result Value Ref Range    RBC, UA 0-2 None Seen, 0-2 /HPF    WBC, UA 0-2 None Seen, 0-2 /HPF    Bacteria, UA None Seen None Seen /HPF    Squamous Epithelial Cells, UA 3-6 (A) None Seen, 0-2 /HPF    Hyaline Casts, UA 0-2 None Seen /LPF    Methodology Automated Microscopy          RADIOLOGY  CT Abdomen Pelvis With Contrast    Result Date: 12/21/2024  CT ABDOMEN AND PELVIS WITH IV CONTRAST  HISTORY: Abdominal pain  TECHNIQUE: Radiation dose reduction techniques were utilized, including automated exposure control and exposure modulation based on body size. 3 mm images were obtained through the abdomen and pelvis after the administration of IV contrast.  COMPARISON: CT abdomen pelvis 5/19/2021 and 3/25/2021   FINDINGS: Calcified splenic granulomas. Unchanged prominent likely right upper quadrant small bowel-small bowel anastomosis. Post cholecystectomy. Nondilated biliary tree. Appendix not definitively identified. No secondary findings to suggest appendicitis. Hysterectomy. Remaining solid organs and bowel are normal. No abdominal pelvic adenopathy. No focal osseous abnormality.       No acute abdominopelvic abnormality.  This report was finalized on 12/21/2024 5:57 PM by Dr. Van Dallas M.D on Workstation: [id number]         MEDICATIONS GIVEN IN ER  Medications   lactated ringers bolus 1,000 mL (1,000 mL Intravenous New Bag 12/21/24 1622)   iopamidol (ISOVUE-300) 61 % injection 100 mL (85 mL Intravenous Given 12/21/24 6661)         ORDERS PLACED DURING THIS VISIT:  Orders Placed This Encounter   Procedures    CT Abdomen Pelvis With Contrast    Comprehensive Metabolic Panel    Lipase    Urinalysis With Microscopic If Indicated (No Culture) - Urine, Clean Catch    CBC Auto Differential    [place] Draw    Urinalysis, Microscopic  Only - Urine, Clean Catch    CBC & Differential    Gold Top - SST    Gray Top    Light Blue Top         OUTPATIENT MEDICATION MANAGEMENT:  No current Epic-ordered facility-administered medications on file.     Current Outpatient Medications Ordered in Epic   Medication Sig Dispense Refill    ondansetron ODT (ZOFRAN-ODT) 4 MG disintegrating tablet Take 1 tablet by mouth 4 (Four) Times a Day As Needed for Nausea or Vomiting. 30 tablet 0    baclofen (LIORESAL) 10 MG tablet TAKE 1 TABLET BY MOUTH EVERYDAY AT BEDTIME 90 tablet 3    cholecalciferol (VITAMIN D3) 1000 UNITS tablet Take 1 tablet by mouth Every Other Day.      CINNAMON PO Take 1,000 mg by mouth 2 (two) times a day.      COCONUT OIL PO Take 1,000 mg by mouth 2 (two) times a day.      MAGNESIUM-POTASSIUM PO Take 700 mg by mouth Every Night.      Multiple Vitamin (MULTI-VITAMIN DAILY PO) Take 1 tablet by mouth Daily.      Probiotic Product (PROBIOTIC PO) Take  by mouth.      promethazine (PHENERGAN) 25 MG tablet Take 1 tablet by mouth Every 8 (Eight) Hours As Needed for Nausea or Vomiting. 30 tablet 5    sodium bicarbonate 650 MG tablet Take 1 tablet by mouth 2 (Two) Times a Day. 180 tablet 3    vitamin C (ASCORBIC ACID) 250 MG tablet Take 1 tablet by mouth Daily.      zinc sulfate (ZINCATE) 220 (50 Zn) MG capsule Take 1 capsule by mouth Daily.           PROCEDURES  Procedures            PROGRESS, DATA ANALYSIS, CONSULTS, AND MEDICAL DECISION MAKING  All labs have been independently interpreted by me.  All radiology studies have been reviewed by me. All EKG's have been independently viewed and interpreted by me.  Discussion below represents my analysis of pertinent findings related to patient's condition, differential diagnosis, treatment plan and final disposition.    Differential diagnosis includes but is not limited to diverticulitis, colitis, volvulus, gastroenteritis, pancreatitis IBS, IBD, mesenteric ischemia.    Clinical Scores:                                        ED Course as of 12/21/24 1851   Sat Dec 21, 2024   1810 CT abdomen and pelvis interpreted by me and demonstrates no evidence of obstruction or free air [MW]   1810 Workup in the emergency department is overall unremarkable with no significant laboratory abnormalities, unremarkable urinalysis and radiological evaluation that is overall unremarkable.  Suspect gastroenteritis is likely etiology of patient's symptoms.  Will  on supportive care measures and have patient follow-up closely with primary care.  Return precautions given and patient discharged in stable condition. [MW]      ED Course User Index  [MW] Van Oneal MD             AS OF 18:51 EST VITALS:    BP - (!) 187/90  HR - 99  TEMP - 98.1 °F (36.7 °C)  O2 SATS - 99%    COMPLEXITY OF CARE  Admission was considered but after careful review of the patient's presentation, physical examination, diagnostic results, and response to treatment the patient may be safely discharged with outpatient follow-up.      DIAGNOSIS  Final diagnoses:   Gastroenteritis         DISPOSITION  ED Disposition       ED Disposition   Discharge    Condition   Stable    Comment   --                Please note that portions of this document were completed with a voice recognition program.    Note Disclaimer: At [facility], we believe that sharing information builds trust and better relationships. You are receiving this note because you recently visited [facility]. It is possible you will see health information before a provider has talked with you about it. This kind of information can be easy to misunderstand. To help you fully understand what it means for your health, we urge you to discuss this note with your provider.         Van Oneal MD  12/21/24 1851

## 2024-12-21 NOTE — DISCHARGE INSTRUCTIONS
Please follow-up with your primary care physician within 1 week for repeat evaluation    Please return to the ER with new or worsening symptoms include but not limited to inability tolerated oral intake, fevers, chills, chest pain, shortness breath, changes in mental status, severe worsening of abdominal pain

## 2024-12-27 ENCOUNTER — TELEPHONE (OUTPATIENT)
Dept: CASE MANAGEMENT | Facility: OTHER | Age: 69
End: 2024-12-27
Payer: MEDICARE

## 2024-12-30 ENCOUNTER — OFFICE VISIT (OUTPATIENT)
Dept: FAMILY MEDICINE CLINIC | Facility: CLINIC | Age: 69
End: 2024-12-30
Payer: MEDICARE

## 2024-12-30 VITALS
TEMPERATURE: 97.2 F | BODY MASS INDEX: 24.66 KG/M2 | WEIGHT: 148 LBS | HEART RATE: 58 BPM | DIASTOLIC BLOOD PRESSURE: 72 MMHG | OXYGEN SATURATION: 98 % | SYSTOLIC BLOOD PRESSURE: 150 MMHG | HEIGHT: 65 IN

## 2024-12-30 DIAGNOSIS — Z09 HOSPITAL DISCHARGE FOLLOW-UP: ICD-10-CM

## 2024-12-30 DIAGNOSIS — A08.4 VIRAL GASTROENTERITIS: Primary | ICD-10-CM

## 2024-12-30 PROCEDURE — 99213 OFFICE O/P EST LOW 20 MIN: CPT | Performed by: NURSE PRACTITIONER

## 2024-12-30 PROCEDURE — 1125F AMNT PAIN NOTED PAIN PRSNT: CPT | Performed by: NURSE PRACTITIONER

## 2024-12-30 PROCEDURE — 1111F DSCHRG MED/CURRENT MED MERGE: CPT | Performed by: NURSE PRACTITIONER

## 2024-12-30 PROCEDURE — 1159F MED LIST DOCD IN RCRD: CPT | Performed by: NURSE PRACTITIONER

## 2024-12-30 PROCEDURE — 1160F RVW MEDS BY RX/DR IN RCRD: CPT | Performed by: NURSE PRACTITIONER

## 2024-12-30 NOTE — PROGRESS NOTES
Subjective   Thuy Blackwood is a 69 y.o. female.     History of Present Illness   Thuy Blackwood 69 y.o. female presents today for Emergency Room follow up.  she was treated BHE for n/v/d x 2 weeks  .  I reviewed all of the labs and diagnostic testing.  The patient's medications were not changed:  Current outpatient and discharge medications have been reconciled for the patient.  Reviewed by: JESÚS Chapman ([place])    she does not have a follow up appointment with a specialist:     Hospital note as follows:   EMERGENCY DEPARTMENT ENCOUNTER  Room Number:  21/21  PCP: Cecilia Garza)JESÚS  Independent Historians: Patient        HPI:  Chief Complaint: had concerns including Abdominal Pain.      Context: Thuy Blackwood is a 69 y.o. female with a medical history of HLD, duodenal diverticula who presents to the ED c/o acute nausea, vomiting and diarrhea for 2 weeks.  Symptoms of worsened today.  Patient endorses lower quadrant abdominal discomfort.  Patient states vomitings been well-controlled which continues to have diarrhea.  Patient denies recent antibiotic use.  Patient has a history of cholecystectomy.        Review of prior external notes (non-ED) -and- Review of prior external test results outside of this encounter: Office visit from 11/22/2024 reviewed and notable for visit secondary to annual wellness visit.  Plan at that time was to continue current plans and follow-up in 1 year.     Prescription drug monitoring program review:          PAST MEDICAL HISTORY       Active Ambulatory Problems     Diagnosis Date Noted    Hyperlipidemia      Osteopenia of multiple sites      Personal history of malignant neoplasm of breast      Urinary tract infection with hematuria      Lower abdominal pain 03/19/2021    Diarrhea, unspecified 03/19/2021    Duodenal diverticulum 04/08/2021    Functional diarrhea 04/08/2021    Internal hemorrhoids 04/29/2021    Acute superficial gastritis without hemorrhage  04/29/2021    Pyelonephritis 05/19/2021    Microscopic colitis 05/19/2021    Nausea & vomiting 05/19/2021    Rigors 05/19/2021    Sepsis, unspecified organism 05/19/2021    Fever 05/19/2021    Hematuria 09/19/2021           Resolved Ambulatory Problems     Diagnosis Date Noted    Renal insufficiency      Acute sinusitis             Past Medical History:   Diagnosis Date    Anemia      Anesthesia complication      Arthritis      Breast cancer      Cholelithiasis      Colon cancer screening 04/11/2019    Diverticulosis      H/O bone density study 08/2016    H/O Breast cancer      H/O complete eye exam 2014    Osteopenia      PONV (postoperative nausea and vomiting)      Unspecified kidney failure      UTI (urinary tract infection)      Varicose veins of bilateral lower extremities with pain      Venous insufficiency (chronic) (peripheral)              PAST SURGICAL HISTORY  Surgical History         Past Surgical History:   Procedure Laterality Date    BLADDER REPAIR        BLADDER SUSPENSION        BREAST SURGERY         Breast cancer    COLONOSCOPY N/A 04/29/2021     Procedure: COLONOSCOPY to cecum and TI with cold bx;  Surgeon: Keke Yarbrough MD;  Location: [facility] ENDOSCOPY;  Service: General;  Laterality: N/A;  pre - diarrhea, screening  post - internal hemorrhoids    ENDOSCOPY N/A 04/29/2021     Procedure: ESOPHAGOGASTRODUODENOSCOPY with cold bx;  Surgeon: Keke Yarbrough MD;  Location: [facility] ENDOSCOPY;  Service: General;  Laterality: N/A;  pre - duodenal diverticulum, diarrhea  post - bile reflux. duodenum diverticuli, antrum gastritis    ENDOSCOPY AND COLONOSCOPY   06/2010     Upper and lower GI endoscopies performed by Dr. Lawton showed gastritis but no other pathological diagnosis made.    EYE SURGERY        HAND SURGERY        HYSTERECTOMY   1995     partial; ovaries in situ    LAPAROSCOPIC CHOLECYSTECTOMY N/A 06/28/2010     Dr. Shawn Agarwal    MAMMO BILATERAL   08/2016     [facility] does  breast exams    MASTECTOMY Left 1992    PAP SMEAR   scheduled     p hyst . dr nora dozier    SUBTOTAL HYSTERECTOMY        TUBAL ABDOMINAL LIGATION   1986               FAMILY HISTORY        Family History   Problem Relation Age of Onset    Arthritis Mother      Heart disease Mother      Diabetes Father      Heart disease Father      Depression Other      Heart disease Other      Hypertension Other      Heart attack Other      Rheum arthritis Other      Cancer Other      Malig Hyperthermia Neg Hx              SOCIAL HISTORY  Social History   Social History            Socioeconomic History    Marital status:    Tobacco Use    Smoking status: Never    Smokeless tobacco: Never   Vaping Use    Vaping status: Never Used   Substance and Sexual Activity    Alcohol use: Yes       Alcohol/week: 3.0 standard drinks of alcohol       Types: 2 Glasses of wine, 1 Cans of beer per week       Comment: occasional    Drug use: No    Sexual activity: Yes       Partners: Male               ALLERGIES  Doxycycline hyclate and Levaquin [levofloxacin]        REVIEW OF SYSTEMS  Review of Systems  Included in HPI  All systems reviewed and negative except for those discussed in HPI.        PHYSICAL EXAM     I have reviewed the triage vital signs and nursing notes.            ED Triage Vitals [12/21/24 1532]   Temp Heart Rate Resp BP SpO2   98.1 °F (36.7 °C) 93 14 -- 100 %       Temp src Heart Rate Source Patient Position BP Location FiO2 (%)   -- -- -- -- --         Physical Exam  GENERAL: alert, no acute distress  SKIN: Warm, dry  HENT: Normocephalic, atraumatic  EYES: no scleral icterus  CV: regular rhythm, regular rate  RESPIRATORY: normal effort, lungs clear  ABDOMEN: soft, mild tenderness palpation of bilateral lower quadrants  MUSCULOSKELETAL: no deformity  NEURO: alert, moves all extremities, follows commands                 LAB RESULTS  Recent Results         Recent Results (from the past 24 hours)   Comprehensive Metabolic Panel      Collection Time: 12/21/24  4:24 PM     Specimen: Blood   Result Value Ref Range     Glucose 103 (H) 65 - 99 mg/dL     BUN 23 8 - 23 mg/dL     Creatinine 0.97 0.57 - 1.00 mg/dL     Sodium 139 136 - 145 mmol/L     Potassium 4.0 3.5 - 5.2 mmol/L     Chloride 104 98 - 107 mmol/L     CO2 23.0 22.0 - 29.0 mmol/L     Calcium 9.8 8.6 - 10.5 mg/dL     Total Protein 6.9 6.0 - 8.5 g/dL     Albumin 4.2 3.5 - 5.2 g/dL     ALT (SGPT) 17 1 - 33 U/L     AST (SGOT) 22 1 - 32 U/L     Alkaline Phosphatase 61 39 - 117 U/L     Total Bilirubin 0.6 0.0 - 1.2 mg/dL     Globulin 2.7 gm/dL     A/G Ratio 1.6 g/dL     BUN/Creatinine Ratio 23.7 7.0 - 25.0     Anion Gap 12.0 5.0 - 15.0 mmol/L     eGFR 62.6 >60.0 mL/min/1.73   Lipase     Collection Time: 12/21/24  4:24 PM     Specimen: Blood   Result Value Ref Range     Lipase 37 13 - 60 U/L   Urinalysis With Microscopic If Indicated (No Culture) - Urine, Clean Catch     Collection Time: 12/21/24  4:24 PM     Specimen: Urine, Clean Catch   Result Value Ref Range     Color, UA Dark Yellow (A) Yellow, Straw     Appearance, UA Clear Clear     pH, UA 7.0 5.0 - 8.0     Specific Gravity, UA 1.026 1.005 - 1.030     Glucose, UA Negative Negative     Ketones, UA 15 mg/dL (1+) (A) Negative     Bilirubin, UA Negative Negative     Blood, UA Negative Negative     Protein, UA Trace (A) Negative     Leuk Esterase, UA Small (1+) (A) Negative     Nitrite, UA Negative Negative     Urobilinogen, UA 1.0 E.U./dL 0.2 - 1.0 E.U./dL   CBC Auto Differential     Collection Time: 12/21/24  4:24 PM     Specimen: Blood   Result Value Ref Range     WBC 9.80 3.40 - 10.80 10*3/mm3     RBC 4.12 3.77 - 5.28 10*6/mm3     Hemoglobin 13.1 12.0 - 15.9 g/dL     Hematocrit 39.5 34.0 - 46.6 %     MCV 95.9 79.0 - 97.0 fL     MCH 31.8 26.6 - 33.0 pg     MCHC 33.2 31.5 - 35.7 g/dL     RDW 12.3 12.3 - 15.4 %     RDW-SD 43.6 37.0 - 54.0 fl     MPV 9.5 6.0 - 12.0 fL     Platelets 287 140 - 450 10*3/mm3     Neutrophil % 90.2 (H) 42.7 -  76.0 %     Lymphocyte % 3.7 (L) 19.6 - 45.3 %     Monocyte % 5.3 5.0 - 12.0 %     Eosinophil % 0.4 0.3 - 6.2 %     Basophil % 0.2 0.0 - 1.5 %     Immature Grans % 0.2 0.0 - 0.5 %     Neutrophils, Absolute 8.84 (H) 1.70 - 7.00 10*3/mm3     Lymphocytes, Absolute 0.36 (L) 0.70 - 3.10 10*3/mm3     Monocytes, Absolute 0.52 0.10 - 0.90 10*3/mm3     Eosinophils, Absolute 0.04 0.00 - 0.40 10*3/mm3     Basophils, Absolute 0.02 0.00 - 0.20 10*3/mm3     Immature Grans, Absolute 0.02 0.00 - 0.05 10*3/mm3     nRBC 0.0 0.0 - 0.2 /100 WBC   Gold Top - SST     Collection Time: 12/21/24  4:24 PM   Result Value Ref Range     Extra Tube Hold for add-ons.     Gray Top     Collection Time: 12/21/24  4:24 PM   Result Value Ref Range     Extra Tube Hold for add-ons.     Light Blue Top     Collection Time: 12/21/24  4:24 PM   Result Value Ref Range     Extra Tube Hold for add-ons.     Urinalysis, Microscopic Only - Urine, Clean Catch     Collection Time: 12/21/24  4:24 PM     Specimen: Urine, Clean Catch   Result Value Ref Range     RBC, UA 0-2 None Seen, 0-2 /HPF     WBC, UA 0-2 None Seen, 0-2 /HPF     Bacteria, UA None Seen None Seen /HPF     Squamous Epithelial Cells, UA 3-6 (A) None Seen, 0-2 /HPF     Hyaline Casts, UA 0-2 None Seen /LPF     Methodology Automated Microscopy                 RADIOLOGY  CT Abdomen Pelvis With Contrast     Result Date: 12/21/2024  CT ABDOMEN AND PELVIS WITH IV CONTRAST  HISTORY: Abdominal pain  TECHNIQUE: Radiation dose reduction techniques were utilized, including automated exposure control and exposure modulation based on body size. 3 mm images were obtained through the abdomen and pelvis after the administration of IV contrast.  COMPARISON: CT abdomen pelvis 5/19/2021 and 3/25/2021   FINDINGS: Calcified splenic granulomas. Unchanged prominent likely right upper quadrant small bowel-small bowel anastomosis. Post cholecystectomy. Nondilated biliary tree. Appendix not definitively identified. No secondary  findings to suggest appendicitis. Hysterectomy. Remaining solid organs and bowel are normal. No abdominal pelvic adenopathy. No focal osseous abnormality.        No acute abdominopelvic abnormality.  This report was finalized on 12/21/2024 5:57 PM by Dr. Van Dallas M.D on Workstation: [id number]           MEDICATIONS GIVEN IN ER  Medications   lactated ringers bolus 1,000 mL (1,000 mL Intravenous New Bag 12/21/24 5974)   iopamidol (ISOVUE-300) 61 % injection 100 mL (85 mL Intravenous Given 12/21/24 6946)            ORDERS PLACED DURING THIS VISIT:      Orders Placed This Encounter   Procedures    CT Abdomen Pelvis With Contrast    Comprehensive Metabolic Panel    Lipase    Urinalysis With Microscopic If Indicated (No Culture) - Urine, Clean Catch    CBC Auto Differential    [place] Draw    Urinalysis, Microscopic Only - Urine, Clean Catch    CBC & Differential    Gold Top - [facility] [[email or web address]]    Gray Top    Light Blue Top            OUTPATIENT MEDICATION MANAGEMENT:  Current Medications and Prescriptions Ordered in Epic   No current Epic-ordered facility-administered medications on file.             Current Outpatient Medications Ordered in Epic   Medication Sig Dispense Refill    ondansetron ODT (ZOFRAN-ODT) 4 MG disintegrating tablet Take 1 tablet by mouth 4 (Four) Times a Day As Needed for Nausea or Vomiting. 30 tablet 0    baclofen (LIORESAL) 10 MG tablet TAKE 1 TABLET BY MOUTH EVERYDAY AT BEDTIME 90 tablet 3    cholecalciferol (VITAMIN D3) 1000 UNITS tablet Take 1 tablet by mouth Every Other Day.        CINNAMON PO Take 1,000 mg by mouth 2 (two) times a day.        COCONUT OIL PO Take 1,000 mg by mouth 2 (two) times a day.        MAGNESIUM-POTASSIUM PO Take 700 mg by mouth Every Night.        Multiple Vitamin (MULTI-VITAMIN DAILY PO) Take 1 tablet by mouth Daily.        Probiotic Product (PROBIOTIC PO) Take  by mouth.        promethazine (PHENERGAN) 25 MG tablet Take 1 tablet by mouth Every 8 (Eight) Hours As Needed for  Nausea or Vomiting. 30 tablet 5    sodium bicarbonate 650 MG tablet Take 1 tablet by mouth 2 (Two) Times a Day. 180 tablet 3    vitamin C (ASCORBIC ACID) 250 MG tablet Take 1 tablet by mouth Daily.        zinc sulfate (ZINCATE) 220 (50 Zn) MG capsule Take 1 capsule by mouth Daily.                   PROCEDURES  Procedures                 PROGRESS, DATA ANALYSIS, CONSULTS, AND MEDICAL DECISION MAKING  All labs have been independently interpreted by me.  All radiology studies have been reviewed by me. All EKG's have been independently viewed and interpreted by me.  Discussion below represents my analysis of pertinent findings related to patient's condition, differential diagnosis, treatment plan and final disposition.     Differential diagnosis includes but is not limited to diverticulitis, colitis, volvulus, gastroenteritis, pancreatitis IBS, IBD, mesenteric ischemia.     Clinical Scores:                                               ED Course as of 12/21/24 1851   Sat Dec 21, 2024   1810 CT abdomen and pelvis interpreted by me and demonstrates no evidence of obstruction or free air [MW]   1810 Workup in the emergency department is overall unremarkable with no significant laboratory abnormalities, unremarkable urinalysis and radiological evaluation that is overall unremarkable.  Suspect gastroenteritis is likely etiology of patient's symptoms.  Will  on supportive care measures and have patient follow-up closely with primary care.  Return precautions given and patient discharged in stable condition. [MW]       ED Course User Index  [MW] Van Oneal MD                  AS OF 18:51 EST VITALS:     BP - (!) 187/90  HR - 99  TEMP - 98.1 °F (36.7 °C)  O2 SATS - 99%     COMPLEXITY OF CARE  Admission was considered but after careful review of the patient's presentation, physical examination, diagnostic results, and response to treatment the patient may be safely discharged with outpatient follow-up.         DIAGNOSIS  Final diagnoses:   Gastroenteritis        Patient reports near resolution of symptoms.  She is no longer having nausea, vomiting or diarrhea but is actually having some constipation and bloating now.  She was taking Zofran but has stopped taking that. She is still taking Maalox.  She is still taking daily probiotic.      The following portions of the patient's history were reviewed and updated as appropriate: allergies, current medications, past family history, past medical history, past social history, past surgical history, and problem list.    Review of Systems   Constitutional:  Negative for unexpected weight change.   Respiratory:  Negative for shortness of breath.    Cardiovascular:  Negative for chest pain and palpitations.   Gastrointestinal:  Positive for constipation.   Psychiatric/Behavioral:  Negative for behavioral problems.        Objective   Physical Exam  Vitals and nursing note reviewed.   Constitutional:       Appearance: Normal appearance. She is well-developed.   Cardiovascular:      Rate and Rhythm: Normal rate and regular rhythm.   Pulmonary:      Effort: Pulmonary effort is normal.      Breath sounds: Normal breath sounds.   Neurological:      Mental Status: She is alert and oriented to person, place, and time.   Psychiatric:         Mood and Affect: Mood normal.         Behavior: Behavior normal.         Thought Content: Thought content normal.         Judgment: Judgment normal.         Assessment & Plan   Diagnoses and all orders for this visit:    1. Viral gastroenteritis (Primary)    2. Hospital discharge follow-up        Hospital records reviewed with pt confirming HPI.   Current outpatient and discharge medications have been reconciled for the patient.  Reviewed by: JESÚS Chapman ([place])         She will stop Maalox.  She will double her probiotic for a week.  She will take MiraLAX daily in the morning until bowel movement achieved.

## 2025-03-04 ENCOUNTER — TELEMEDICINE (OUTPATIENT)
Dept: FAMILY MEDICINE CLINIC | Facility: CLINIC | Age: 70
End: 2025-03-04
Payer: MEDICARE

## 2025-03-04 DIAGNOSIS — F41.1 ANXIETY AS ACUTE REACTION TO EXCEPTIONAL STRESS: Primary | ICD-10-CM

## 2025-03-04 DIAGNOSIS — F43.0 ANXIETY AS ACUTE REACTION TO EXCEPTIONAL STRESS: Primary | ICD-10-CM

## 2025-03-04 DIAGNOSIS — G47.09 OTHER INSOMNIA: ICD-10-CM

## 2025-03-04 NOTE — PROGRESS NOTES
Subjective   Thuy Blackwood is a 69 y.o. female.   You have chosen to receive care through a telehealth visit.  Do you consent to use a video/audio connection for your medical care today? Yes  Patient at home during time of encounter, provider in office.  Anxiety   Symptoms include decreased concentration and nervous/anxious behavior.      Thuy Blackwood female 69 y.o., LMP  (LMP Unknown)   who presents today for new evaluation and treatment of Depression, Insomnia, and Anxiety.  She reports crying spells, difficulty falling asleep, early AM awakening, fatigue, anxiety, impaired concentration, excessive worry, unable to relax, and panic feeling. Onset of symptoms was approximately several days ago. She denies current suicidal and homicidal ideation.  She has no prior history of anxiety or depression nor has she been on any medication.  This started last week and she found out that her son-in-law and daughter-in-law had been having an affair for 2 years.  She is experiencing grief and anxiety for herself, her children and grandchildren.    The following portions of the patient's history were reviewed and updated as appropriate: allergies, current medications, past family history, past medical history, past social history, past surgical history, and problem list.    Review of Systems   Constitutional:  Negative for unexpected weight change.   Psychiatric/Behavioral:  Positive for decreased concentration and sleep disturbance. Negative for behavioral problems. The patient is nervous/anxious.        Objective   Physical Exam  Vitals and nursing note reviewed.   Constitutional:       Appearance: Normal appearance. She is well-developed.   Pulmonary:      Effort: Pulmonary effort is normal.   Neurological:      Mental Status: She is alert and oriented to person, place, and time.   Psychiatric:         Attention and Perception: Attention normal.         Mood and Affect: Mood is anxious.         Behavior: Behavior  is agitated.         Thought Content: Thought content normal.         Cognition and Memory: Cognition normal.         Judgment: Judgment normal.         Assessment & Plan   Problems Addressed this Visit    None  Visit Diagnoses       Anxiety as acute reaction to exceptional stress    -  Primary    Relevant Medications    amitriptyline (ELAVIL) 25 MG tablet    Other insomnia        Relevant Medications    amitriptyline (ELAVIL) 25 MG tablet          Diagnoses         Codes Comments    Anxiety as acute reaction to exceptional stress    -  Primary ICD-10-CM: F41.1, F43.0  ICD-9-CM: 308.0     Other insomnia     ICD-10-CM: G47.09  ICD-9-CM: 780.52               Will start low-dose amitriptyline for her to take in the evening to help with anxiety and sleep.  She will let me know via [employer] how she is doing with medication or if she has any issues with it.     Total time spent on encounter was 10 minutes.

## 2025-03-30 DIAGNOSIS — F43.0 ANXIETY AS ACUTE REACTION TO EXCEPTIONAL STRESS: ICD-10-CM

## 2025-03-30 DIAGNOSIS — F41.1 ANXIETY AS ACUTE REACTION TO EXCEPTIONAL STRESS: ICD-10-CM

## 2025-03-30 DIAGNOSIS — G47.09 OTHER INSOMNIA: ICD-10-CM

## 2025-05-02 DIAGNOSIS — F43.0 ANXIETY AS ACUTE REACTION TO EXCEPTIONAL STRESS: ICD-10-CM

## 2025-05-02 DIAGNOSIS — G47.09 OTHER INSOMNIA: ICD-10-CM

## 2025-05-02 DIAGNOSIS — F41.1 ANXIETY AS ACUTE REACTION TO EXCEPTIONAL STRESS: ICD-10-CM

## 2025-05-05 ENCOUNTER — OFFICE VISIT (OUTPATIENT)
Dept: FAMILY MEDICINE CLINIC | Facility: CLINIC | Age: 70
End: 2025-05-05
Payer: MEDICARE

## 2025-05-05 VITALS
SYSTOLIC BLOOD PRESSURE: 132 MMHG | OXYGEN SATURATION: 97 % | HEIGHT: 65 IN | WEIGHT: 141.6 LBS | TEMPERATURE: 97.7 F | DIASTOLIC BLOOD PRESSURE: 62 MMHG | BODY MASS INDEX: 23.59 KG/M2 | HEART RATE: 99 BPM

## 2025-05-05 DIAGNOSIS — R51.9 PRESSURE IN HEAD: ICD-10-CM

## 2025-05-05 DIAGNOSIS — R41.9 UNSPECIFIED SYMPTOMS AND SIGNS INVOLVING COGNITIVE FUNCTIONS AND AWARENESS: ICD-10-CM

## 2025-05-05 DIAGNOSIS — R01.1 MURMUR, CARDIAC: ICD-10-CM

## 2025-05-05 DIAGNOSIS — R55 NEAR SYNCOPE: Primary | ICD-10-CM

## 2025-05-05 DIAGNOSIS — Z13.6 ENCOUNTER FOR SCREENING FOR CARDIOVASCULAR DISORDERS: ICD-10-CM

## 2025-05-05 DIAGNOSIS — R63.8 OTHER SYMPTOMS AND SIGNS CONCERNING FOOD AND FLUID INTAKE: ICD-10-CM

## 2025-05-05 NOTE — PROGRESS NOTES
Subjective   Thuy Blackwood is a 69 y.o. female.     History of Present Illness   Chief Complaint     Dizziness (-It's been going on for about 2 to 3 weeks)- she has had 3 episodes of lightheadedness to the point of blacking out but not fully losing consciousness.  She states she will feel the warmth move up her body and then vision starts to fade.  Denies any headaches.     Allergies (-Scratchy throat  -Watery eyes )- started yesterday.  Not associated with her lightheaded episodes.     The following portions of the patient's history were reviewed and updated as appropriate: allergies, current medications, past family history, past medical history, past social history, past surgical history, and problem list.    Review of Systems   Constitutional:  Negative for unexpected weight change.   Cardiovascular:  Positive for palpitations. Negative for chest pain and leg swelling.   Neurological:  Positive for syncope, light-headedness and headaches.   Psychiatric/Behavioral:  Negative for behavioral problems.        Objective   Physical Exam  Vitals and nursing note reviewed.   Constitutional:       Appearance: Normal appearance. She is well-developed.   Cardiovascular:      Rate and Rhythm: Normal rate and regular rhythm.      Heart sounds: Murmur heard.      Systolic murmur is present with a grade of 2/6.   Pulmonary:      Effort: Pulmonary effort is normal.      Breath sounds: Normal breath sounds.   Neurological:      Mental Status: She is alert and oriented to person, place, and time.   Psychiatric:         Mood and Affect: Mood normal.         Behavior: Behavior normal.         Thought Content: Thought content normal.         Judgment: Judgment normal.         Assessment & Plan   Diagnoses and all orders for this visit:    1. Near syncope (Primary)  -     Comprehensive metabolic panel  -     Lipid panel  -     CBC and Differential  -     TSH  -     T4, free  -     Vitamin B12  -     Iron level  -     Ferritin  -      CT Chest Without Contrast  -     Adult Transthoracic Echo Complete W/ Cont if Necessary Per Protocol    2. Pressure in head  -     Comprehensive metabolic panel  -     Lipid panel  -     CBC and Differential  -     TSH  -     T4, free  -     Vitamin B12  -     Iron level  -     Ferritin  -     CT Chest Without Contrast    3. Encounter for screening for cardiovascular disorders  -     Lipid panel    4. Unspecified symptoms and signs involving cognitive functions and awareness  -     TSH  -     T4, free    5. Other symptoms and signs concerning food and fluid intake  -     Iron level  -     Ferritin    6. Murmur, cardiac  -     Adult Transthoracic Echo Complete W/ Cont if Necessary Per Protocol

## 2025-05-06 ENCOUNTER — RESULTS FOLLOW-UP (OUTPATIENT)
Dept: FAMILY MEDICINE CLINIC | Facility: CLINIC | Age: 70
End: 2025-05-06
Payer: MEDICARE

## 2025-05-06 ENCOUNTER — TELEPHONE (OUTPATIENT)
Dept: FAMILY MEDICINE CLINIC | Facility: CLINIC | Age: 70
End: 2025-05-06
Payer: MEDICARE

## 2025-05-06 DIAGNOSIS — R93.1 ABNORMAL ECHOCARDIOGRAM: Primary | ICD-10-CM

## 2025-05-06 DIAGNOSIS — R55 NEAR SYNCOPE: ICD-10-CM

## 2025-05-06 DIAGNOSIS — R91.8 MULTIPLE PULMONARY NODULES: ICD-10-CM

## 2025-05-06 LAB
ALBUMIN SERPL-MCNC: 4.3 G/DL (ref 3.8–4.8)
ALP SERPL-CCNC: 64 IU/L (ref 44–121)
ALT SERPL-CCNC: 17 IU/L (ref 0–32)
AST SERPL-CCNC: 22 IU/L (ref 0–40)
BASOPHILS # BLD AUTO: 0 X10E3/UL (ref 0–0.2)
BASOPHILS NFR BLD AUTO: 0 %
BILIRUB SERPL-MCNC: 0.4 MG/DL (ref 0–1.2)
BUN SERPL-MCNC: 17 MG/DL (ref 8–27)
BUN/CREAT SERPL: 19 (ref 12–28)
CALCIUM SERPL-MCNC: 9.5 MG/DL (ref 8.7–10.3)
CHLORIDE SERPL-SCNC: 101 MMOL/L (ref 96–106)
CHOLEST SERPL-MCNC: 187 MG/DL (ref 100–199)
CO2 SERPL-SCNC: 23 MMOL/L (ref 20–29)
CREAT SERPL-MCNC: 0.89 MG/DL (ref 0.57–1)
EGFRCR SERPLBLD CKD-EPI 2021: 69 ML/MIN/1.73
EOSINOPHIL # BLD AUTO: 0.1 X10E3/UL (ref 0–0.4)
EOSINOPHIL NFR BLD AUTO: 3 %
ERYTHROCYTE [DISTWIDTH] IN BLOOD BY AUTOMATED COUNT: 12.5 % (ref 11.7–15.4)
FERRITIN SERPL-MCNC: 152 NG/ML (ref 15–150)
GLOBULIN SER CALC-MCNC: 2.4 G/DL (ref 1.5–4.5)
GLUCOSE SERPL-MCNC: 86 MG/DL (ref 70–99)
HCT VFR BLD AUTO: 39.3 % (ref 34–46.6)
HDLC SERPL-MCNC: 53 MG/DL
HGB BLD-MCNC: 12.8 G/DL (ref 11.1–15.9)
IMM GRANULOCYTES # BLD AUTO: 0 X10E3/UL (ref 0–0.1)
IMM GRANULOCYTES NFR BLD AUTO: 0 %
IRON SERPL-MCNC: 46 UG/DL (ref 27–139)
LDLC SERPL CALC-MCNC: 112 MG/DL (ref 0–99)
LYMPHOCYTES # BLD AUTO: 0.9 X10E3/UL (ref 0.7–3.1)
LYMPHOCYTES NFR BLD AUTO: 19 %
MCH RBC QN AUTO: 30.8 PG (ref 26.6–33)
MCHC RBC AUTO-ENTMCNC: 32.6 G/DL (ref 31.5–35.7)
MCV RBC AUTO: 95 FL (ref 79–97)
MONOCYTES # BLD AUTO: 0.9 X10E3/UL (ref 0.1–0.9)
MONOCYTES NFR BLD AUTO: 19 %
NEUTROPHILS # BLD AUTO: 2.6 X10E3/UL (ref 1.4–7)
NEUTROPHILS NFR BLD AUTO: 59 %
PLATELET # BLD AUTO: 287 X10E3/UL (ref 150–450)
POTASSIUM SERPL-SCNC: 4.9 MMOL/L (ref 3.5–5.2)
PROT SERPL-MCNC: 6.7 G/DL (ref 6–8.5)
RBC # BLD AUTO: 4.15 X10E6/UL (ref 3.77–5.28)
SODIUM SERPL-SCNC: 138 MMOL/L (ref 134–144)
T4 FREE SERPL-MCNC: 1.07 NG/DL (ref 0.82–1.77)
TRIGL SERPL-MCNC: 123 MG/DL (ref 0–149)
TSH SERPL DL<=0.005 MIU/L-ACNC: 2.69 UIU/ML (ref 0.45–4.5)
VIT B12 SERPL-MCNC: 362 PG/ML (ref 232–1245)
VLDLC SERPL CALC-MCNC: 22 MG/DL (ref 5–40)
WBC # BLD AUTO: 4.5 X10E3/UL (ref 3.4–10.8)

## 2025-05-06 NOTE — TELEPHONE ENCOUNTER
"  Caller: Thuy Blackwood \"Neil\"    Relationship: Self    Best call back number: [phone]     What was the call regarding: PATIENT IS CHECKING STATUS OF ORDERS AND WOULD LIKE TO KNOW IF THEY CAN BE PLACED STAT.  SHE WANTS TO KNOW WHAT NEXT STEPS SHOULD BE. SHE HAS HAD TWO EPISODES THIS MORNING OF FEELING HEAT RADIATE THRU HER BODY BUT DID NOT PASS OUT    PLEASE CALL.  "

## 2025-05-06 NOTE — TELEPHONE ENCOUNTER
Called pt to inform her of the status of her orders that were placed. She has alread acknowledge that they take up to a week to be scheduled. Told pt that were placed and that Pcp isn't able to expidite them as requested of pt.

## 2025-05-06 NOTE — LETTER
Thuy Blackwood  [address] [facility] [address]    June 2, 2025     Dear Ms. Blackwood:    Below are the results from your recent visit:    Resulted Orders   Comprehensive metabolic panel   Result Value Ref Range    Glucose 86 70 - 99 mg/dL    BUN 17 8 - 27 mg/dL    Creatinine 0.89 0.57 - 1.00 mg/dL    EGFR Result 69 >59 mL/min/1.73    BUN/Creatinine Ratio 19 12 - 28    Sodium 138 134 - 144 mmol/L    Potassium 4.9 3.5 - 5.2 mmol/L    Chloride 101 96 - 106 mmol/L    Total CO2 23 20 - 29 mmol/L    Calcium 9.5 8.7 - 10.3 mg/dL    Total Protein 6.7 6.0 - 8.5 g/dL    Albumin 4.3 3.8 - 4.8 g/dL    Globulin 2.4 1.5 - 4.5 g/dL    Total Bilirubin 0.4 0.0 - 1.2 mg/dL    Alkaline Phosphatase 64 44 - 121 IU/L    AST (SGOT) 22 0 - 40 IU/L    ALT (SGPT) 17 0 - 32 IU/L   Lipid panel   Result Value Ref Range    Total Cholesterol 187 100 - 199 mg/dL    Triglycerides 123 0 - 149 mg/dL    HDL Cholesterol 53 >39 mg/dL    VLDL Cholesterol Will 22 5 - 40 mg/dL    LDL Chol Calc (NIH) 112 (H) 0 - 99 mg/dL   CBC and Differential   Result Value Ref Range    WBC 4.5 3.4 - 10.8 x10E3/uL    RBC 4.15 3.77 - 5.28 x10E6/uL    Hemoglobin 12.8 11.1 - 15.9 g/dL    Hematocrit 39.3 34.0 - 46.6 %    MCV 95 79 - 97 fL    MCH 30.8 26.6 - 33.0 pg    MCHC 32.6 31.5 - 35.7 g/dL    RDW 12.5 11.7 - 15.4 %    Platelets 287 150 - 450 x10E3/uL    Neutrophil Rel % 59 Not Estab. %    Lymphocyte Rel % 19 Not Estab. %    Monocyte Rel % 19 Not Estab. %    Eosinophil Rel % 3 Not Estab. %    Basophil Rel % 0 Not Estab. %    Neutrophils Absolute 2.6 1.4 - 7.0 x10E3/uL    Lymphocytes Absolute 0.9 0.7 - 3.1 x10E3/uL    Monocytes Absolute 0.9 0.1 - 0.9 x10E3/uL    Eosinophils Absolute 0.1 0.0 - 0.4 x10E3/uL    Basophils Absolute 0.0 0.0 - 0.2 x10E3/uL    Immature Granulocyte Rel % 0 Not Estab. %    Immature Grans Absolute 0.0 0.0 - 0.1 x10E3/uL   TSH   Result Value Ref Range    TSH 2.690 0.450 - 4.500 uIU/mL   T4, free   Result Value Ref Range    Free T4 1.07 0.82 -  1.77 ng/dL   Vitamin B12   Result Value Ref Range    Vitamin B-12 362 232 - 1,245 pg/mL   Iron level   Result Value Ref Range    Iron 46 27 - 139 ug/dL   Ferritin   Result Value Ref Range    Ferritin 152 (H) 15 - 150 ng/mL   Adult Transthoracic Echo Complete W/ Cont if Necessary Per Protocol   Result Value Ref Range    EF(MOD-bp) 56.2 %    LVIDd 4.6 cm    LVIDs 3.2 cm    IVSd 1.00 cm    LVPWd 1.10 cm    FS 29.7 %    IVS/LVPW 0.91 cm    ESV(cubed) 33.9 ml    LV Sys Vol (BSA corrected) 25.4 cm2    EDV(cubed) 97.3 ml    LV Steven Vol (BSA corrected) 60.2 cm2    LV mass(C)d 169.9 grams    LVOT area 3.2 cm2    LVOT diam 2.03 cm    EDV(MOD-sp2) 87.0 ml    EDV(MOD-sp4) 102.0 ml    ESV(MOD-sp2) 36.0 ml    ESV(MOD-sp4) 43.0 ml    SV(MOD-sp2) 51.0 ml    SV(MOD-sp4) 59.0 ml    SVi(MOD-SP2) 30.1 ml/m2    SVi(MOD-SP4) 34.8 ml/m2    SVi (LVOT) 41.2 ml/m2    EF(MOD-sp2) 58.6 %    EF(MOD-sp4) 57.8 %    MV E max harpreet 74.1 cm/sec    MV A max harpreet 129.0 cm/sec    MV dec time 0.21 sec    MV E/A 0.57     Pulm A Revs Dur 0.15 sec    MV A dur 0.18 sec    LA ESV Index (BP) 26.7 ml/m2    Med Peak E' Harpreet 5.1 cm/sec    Lat Peak E' Harpreet 10.8 cm/sec    TR max harpreet 218.5 cm/sec    Avg E/e' ratio 9.32     SV(LVOT) 69.8 ml    SV(RVOT) 54.9 ml    Qp/Qs 0.79     RV Base 2.24 cm    RV Mid 2.12 cm    RV Length 6.8 cm    TAPSE (>1.6) 2.36 cm    RV S' 13.3 cm/sec    Pulm Sys Harpreet 54.4 cm/sec    Pulm Steven Harpreet 36.4 cm/sec    Pulm S/D 1.49     Pulm A Revs Harpreet 27.4 cm/sec    LV V1 max 98.1 cm/sec    LV V1 max PG 3.8 mmHg    LV V1 mean PG 2.00 mmHg    LV V1 VTI 21.5 cm    Ao pk harpreet 115.0 cm/sec    Ao max PG 5.3 mmHg    Ao mean PG 3.0 mmHg    Ao V2 VTI 22.1 cm    JADEN(I,D) 3.2 cm2    Dimensionless Index 0.97 (DI)    AI P1/2t 389.9 msec    MV max PG 6.9 mmHg    MV mean PG 2.14 mmHg    MV V2 VTI 21.0 cm    MV P1/2t 70.1 msec    MVA(P1/2t) 3.1 cm2    MVA(VTI) 3.3 cm2    MV dec slope 357.3 cm/sec2    TR max PG 19.1 mmHg    RVSP(TR) 22.1 mmHg    RAP systole 3.0 mmHg     RVOT diam 2.21 cm    RV V1 max PG 1.61 mmHg    RV V1 max 63.4 cm/sec    RV V1 VTI 14.4 cm    PA V2 max 107.2 cm/sec    PA acc time 0.16 sec    PI end-d jacob 123.5 cm/sec    Ao root diam 3.8 cm    ACS 1.93 cm    Sinus 3.2 cm    STJ 2.5 cm    Ao root area (BSA corrected) 2.2 cm2    Aortic arch 2.9 cm     Echocardiogram does show moderate calcification of aortic valve and moderate regurgitation. This may contribute to her symptoms but I recommend her seeing cardiologist to discuss the echo results and her syncopal episodes. I am placing referral and she will be contacted to schedule.     If you have any questions or concerns, please don't hesitate to call.         Sincerely,        JESÚS Becker

## 2025-05-12 ENCOUNTER — TELEPHONE (OUTPATIENT)
Dept: FAMILY MEDICINE CLINIC | Facility: CLINIC | Age: 70
End: 2025-05-12
Payer: MEDICARE

## 2025-05-12 NOTE — TELEPHONE ENCOUNTER
TK/Patient called because she has not received a phone call to schedule her Echo or CT scan. Patient stated that Dr. Garza advised her to call the office if she has not heard anything in a week.

## 2025-05-13 ENCOUNTER — HOSPITAL ENCOUNTER (OUTPATIENT)
Facility: HOSPITAL | Age: 70
Discharge: HOME | End: 2025-05-13
Admitting: SURGERY
Payer: MEDICARE

## 2025-05-13 ENCOUNTER — OFFICE VISIT (OUTPATIENT)
Age: 70
End: 2025-05-13
Payer: MEDICARE

## 2025-05-13 VITALS
BODY MASS INDEX: 23.64 KG/M2 | HEIGHT: 65 IN | SYSTOLIC BLOOD PRESSURE: 142 MMHG | WEIGHT: 141.9 LBS | DIASTOLIC BLOOD PRESSURE: 72 MMHG

## 2025-05-13 DIAGNOSIS — I83.813 VARICOSE VEINS OF LEG WITH PAIN, BILATERAL: ICD-10-CM

## 2025-05-13 DIAGNOSIS — I87.2 VENOUS (PERIPHERAL) INSUFFICIENCY: Primary | ICD-10-CM

## 2025-05-13 DIAGNOSIS — I87.2 VENOUS (PERIPHERAL) INSUFFICIENCY: ICD-10-CM

## 2025-05-13 PROCEDURE — 93971 EXTREMITY STUDY: CPT

## 2025-05-13 NOTE — PROGRESS NOTES
Chief Complaint  Post op (Varithena)    Subjective        Thuy Blackwood presents to [facility] VASCULAR SURGERY  HPI   Thuy Blackwood is a 69 y.o. female that has been followed in our office for venous insufficiency and varicose veins. She under went a right leg Varithena chemical ablation of Great Saphenous Vein (2 locations), tributary/extension varicose veins off of, and including, truncal vein on 5/9/25 with Dr. Jin. She returns today for follow up along with a spot check. She has been doing well since the procedure with minimal pain. She has been compliant with compression stockings.  Today's scan showed successful chemical Varithena ablation of the right great saphenous vein as well as tributaries, no DVTs.    Thuy Blackwood  reports that she has never smoked. She has never been exposed to tobacco smoke. She has never used smokeless tobacco..        Objective   Vital Signs:  Vitals:    05/13/25 1407   BP: 142/72      Body mass index is 23.61 kg/m².   BMI is within normal parameters. No other follow-up for BMI required.       Physical Exam  Vitals reviewed.   Cardiovascular:      Rate and Rhythm: Normal rate and regular rhythm.      Pulses: Normal pulses.      Heart sounds: Normal heart sounds.   Pulmonary:      Effort: Pulmonary effort is normal.      Breath sounds: Normal breath sounds.   Musculoskeletal:      Right lower leg: No edema.      Left lower leg: No edema.   Neurological:      Mental Status: She is alert and oriented to person, place, and time.          Result Review :      Spot Check: Duplex Venous Lower Extremity - Right CAR (05/13/2025 13:55)                    Assessment and Plan     Diagnoses and all orders for this visit:    1. Venous (peripheral) insufficiency (Primary)  -     Duplex Venous Lower Extremity - Right CAR; Future    2. Varicose veins of leg with pain, bilateral  -     Duplex Venous Lower Extremity - Right CAR; Future              Patient is  doing well status post right Varithena chemical ablation of GSV (2 locations), tributary/extension varicose veins off of, and including, truncal vein.  We discussed that it will take time for these veins to decompress.  We discussed postoperative care instructions including wearing stockings for a total of 2 weeks. She may resume exercise in 2 weeks.  We will plan to follow-up in 12 weeks with a repeat venous duplex and to see the surgeon.    Follow Up     Return in about 3 months (around 8/13/2025) for  RLE LEV and f/u with [facility].      JESÚS Marie

## 2025-05-14 LAB
BH CV LOW VAS RIGHT VARICOSITY AK VESSEL: 1
BH CV LOW VAS RIGHT VARICOSITY BK VESSEL: 1
BH CV LOWER VASCULAR RIGHT COMMON FEMORAL COMPRESS: NORMAL
BH CV LOWER VASCULAR RIGHT DISTAL FEMORAL COMPRESS: NORMAL
BH CV LOWER VASCULAR RIGHT EXTERNAL ILIAC COMPRESS: NORMAL
BH CV LOWER VASCULAR RIGHT GASTRONEMIUS COMPRESS: NORMAL
BH CV LOWER VASCULAR RIGHT MID FEMORAL COMPRESS: NORMAL
BH CV LOWER VASCULAR RIGHT PERONEAL COMPRESS: NORMAL
BH CV LOWER VASCULAR RIGHT POPLITEAL COMPRESS: NORMAL
BH CV LOWER VASCULAR RIGHT POSTERIOR TIBIAL COMPRESS: NORMAL
BH CV LOWER VASCULAR RIGHT PROFUNDA FEMORAL COMPRESS: NORMAL
BH CV LOWER VASCULAR RIGHT PROXIMAL FEMORAL COMPRESS: NORMAL
BH CV LOWER VASCULAR RIGHT SAPHENOFEMORAL JUNCTION COMPRESS: NORMAL
BH CV LOWER VASCULAR RIGHT VARICOSITY AK COMPRESS: NORMAL
BH CV LOWER VASCULAR RIGHT VARICOSITY BK COMPRESS: NORMAL

## 2025-05-27 ENCOUNTER — TELEPHONE (OUTPATIENT)
Dept: CARDIOLOGY | Age: 70
End: 2025-05-27
Payer: MEDICARE

## 2025-05-28 ENCOUNTER — HOSPITAL ENCOUNTER (OUTPATIENT)
Dept: CARDIOLOGY | Facility: HOSPITAL | Age: 70
Discharge: HOME | End: 2025-05-28
Admitting: NURSE PRACTITIONER
Payer: MEDICARE

## 2025-05-28 VITALS
SYSTOLIC BLOOD PRESSURE: 122 MMHG | WEIGHT: 141 LBS | BODY MASS INDEX: 23.49 KG/M2 | DIASTOLIC BLOOD PRESSURE: 64 MMHG | HEIGHT: 65 IN | HEART RATE: 64 BPM

## 2025-05-28 LAB
AORTIC ARCH: 2.9 CM
AORTIC DIMENSIONLESS INDEX: 0.97 (DI)
AV MEAN PRESS GRAD SYS DOP V1V2: 3 MMHG
AV VMAX SYS DOP: 115 CM/SEC
BH CV ECHO MEAS - ACS: 1.93 CM
BH CV ECHO MEAS - AI P1/2T: 389.9 MSEC
BH CV ECHO MEAS - AO MAX PG: 5.3 MMHG
BH CV ECHO MEAS - AO ROOT AREA (BSA CORRECTED): 2.2 CM2
BH CV ECHO MEAS - AO ROOT DIAM: 3.8 CM
BH CV ECHO MEAS - AO V2 VTI: 22.1 CM
BH CV ECHO MEAS - AVA(I,D): 3.2 CM2
BH CV ECHO MEAS - EDV(CUBED): 97.3 ML
BH CV ECHO MEAS - EDV(MOD-SP2): 87 ML
BH CV ECHO MEAS - EDV(MOD-SP4): 102 ML
BH CV ECHO MEAS - EF(MOD-SP2): 58.6 %
BH CV ECHO MEAS - EF(MOD-SP4): 57.8 %
BH CV ECHO MEAS - ESV(CUBED): 33.9 ML
BH CV ECHO MEAS - ESV(MOD-SP2): 36 ML
BH CV ECHO MEAS - ESV(MOD-SP4): 43 ML
BH CV ECHO MEAS - FS: 29.7 %
BH CV ECHO MEAS - IVS/LVPW: 0.91 CM
BH CV ECHO MEAS - IVSD: 1 CM
BH CV ECHO MEAS - LAT PEAK E' VEL: 10.8 CM/SEC
BH CV ECHO MEAS - LV DIASTOLIC VOL/BSA (35-75): 60.2 CM2
BH CV ECHO MEAS - LV MASS(C)D: 169.9 GRAMS
BH CV ECHO MEAS - LV MAX PG: 3.8 MMHG
BH CV ECHO MEAS - LV MEAN PG: 2 MMHG
BH CV ECHO MEAS - LV SYSTOLIC VOL/BSA (12-30): 25.4 CM2
BH CV ECHO MEAS - LV V1 MAX: 98.1 CM/SEC
BH CV ECHO MEAS - LV V1 VTI: 21.5 CM
BH CV ECHO MEAS - LVIDD: 4.6 CM
BH CV ECHO MEAS - LVIDS: 3.2 CM
BH CV ECHO MEAS - LVOT AREA: 3.2 CM2
BH CV ECHO MEAS - LVOT DIAM: 2.03 CM
BH CV ECHO MEAS - LVPWD: 1.1 CM
BH CV ECHO MEAS - MED PEAK E' VEL: 5.1 CM/SEC
BH CV ECHO MEAS - MV A DUR: 0.18 SEC
BH CV ECHO MEAS - MV A MAX VEL: 129 CM/SEC
BH CV ECHO MEAS - MV DEC SLOPE: 357.3 CM/SEC2
BH CV ECHO MEAS - MV DEC TIME: 0.21 SEC
BH CV ECHO MEAS - MV E MAX VEL: 74.1 CM/SEC
BH CV ECHO MEAS - MV E/A: 0.57
BH CV ECHO MEAS - MV MAX PG: 6.9 MMHG
BH CV ECHO MEAS - MV MEAN PG: 2.14 MMHG
BH CV ECHO MEAS - MV P1/2T: 70.1 MSEC
BH CV ECHO MEAS - MV V2 VTI: 21 CM
BH CV ECHO MEAS - MVA(P1/2T): 3.1 CM2
BH CV ECHO MEAS - MVA(VTI): 3.3 CM2
BH CV ECHO MEAS - PA ACC TIME: 0.16 SEC
BH CV ECHO MEAS - PA V2 MAX: 107.2 CM/SEC
BH CV ECHO MEAS - PI END-D VEL: 123.5 CM/SEC
BH CV ECHO MEAS - PULM A REVS DUR: 0.15 SEC
BH CV ECHO MEAS - PULM A REVS VEL: 27.4 CM/SEC
BH CV ECHO MEAS - PULM DIAS VEL: 36.4 CM/SEC
BH CV ECHO MEAS - PULM S/D: 1.49
BH CV ECHO MEAS - PULM SYS VEL: 54.4 CM/SEC
BH CV ECHO MEAS - QP/QS: 0.79
BH CV ECHO MEAS - RAP SYSTOLE: 3 MMHG
BH CV ECHO MEAS - RV MAX PG: 1.61 MMHG
BH CV ECHO MEAS - RV V1 MAX: 63.4 CM/SEC
BH CV ECHO MEAS - RV V1 VTI: 14.4 CM
BH CV ECHO MEAS - RVOT DIAM: 2.21 CM
BH CV ECHO MEAS - RVSP: 22.1 MMHG
BH CV ECHO MEAS - SV(LVOT): 69.8 ML
BH CV ECHO MEAS - SV(MOD-SP2): 51 ML
BH CV ECHO MEAS - SV(MOD-SP4): 59 ML
BH CV ECHO MEAS - SV(RVOT): 54.9 ML
BH CV ECHO MEAS - SVI(LVOT): 41.2 ML/M2
BH CV ECHO MEAS - SVI(MOD-SP2): 30.1 ML/M2
BH CV ECHO MEAS - SVI(MOD-SP4): 34.8 ML/M2
BH CV ECHO MEAS - TAPSE (>1.6): 2.36 CM
BH CV ECHO MEAS - TR MAX PG: 19.1 MMHG
BH CV ECHO MEAS - TR MAX VEL: 218.5 CM/SEC
BH CV ECHO MEASUREMENTS AVERAGE E/E' RATIO: 9.32
BH CV XLRA - RV BASE: 2.24 CM
BH CV XLRA - RV LENGTH: 6.8 CM
BH CV XLRA - RV MID: 2.12 CM
BH CV XLRA - TDI S': 13.3 CM/SEC
LEFT ATRIUM VOLUME INDEX: 26.7 ML/M2
LV EF BIPLANE MOD: 56.2 %
SINUS: 3.2 CM
STJ: 2.5 CM

## 2025-05-28 PROCEDURE — 93306 TTE W/DOPPLER COMPLETE: CPT

## 2025-05-29 DIAGNOSIS — F43.0 ANXIETY AS ACUTE REACTION TO EXCEPTIONAL STRESS: ICD-10-CM

## 2025-05-29 DIAGNOSIS — F41.1 ANXIETY AS ACUTE REACTION TO EXCEPTIONAL STRESS: ICD-10-CM

## 2025-05-29 DIAGNOSIS — G47.09 OTHER INSOMNIA: ICD-10-CM

## 2025-05-29 NOTE — TELEPHONE ENCOUNTER
Rx Refill Note  Requested Prescriptions     Pending Prescriptions Disp Refills    amitriptyline (ELAVIL) 25 MG tablet [Pharmacy Med Name: AMITRIPTYLINE HCL 25 MG TAB] 30 tablet 0     Sig: TAKE 1 TABLET BY MOUTH EVERY DAY AT NIGHT      Last office visit with prescribing clinician: 5/5/2025   Last telemedicine visit with prescribing clinician: 3/4/2025   Next office visit with prescribing clinician: 11/24/2025       NANETTE Brizuela(R)  05/29/25, 15:26 EDT

## 2025-06-01 ENCOUNTER — HOSPITAL ENCOUNTER (OUTPATIENT)
Dept: CT IMAGING | Facility: HOSPITAL | Age: 70
Discharge: HOME | End: 2025-06-01
Admitting: NURSE PRACTITIONER
Payer: MEDICARE

## 2025-06-01 PROCEDURE — 71250 CT THORAX DX C-: CPT

## 2025-06-17 ENCOUNTER — OFFICE VISIT (OUTPATIENT)
Dept: CARDIOLOGY | Age: 70
End: 2025-06-17
Payer: MEDICARE

## 2025-06-17 VITALS
WEIGHT: 140 LBS | DIASTOLIC BLOOD PRESSURE: 82 MMHG | SYSTOLIC BLOOD PRESSURE: 142 MMHG | HEART RATE: 78 BPM | HEIGHT: 65 IN | BODY MASS INDEX: 23.32 KG/M2

## 2025-06-17 DIAGNOSIS — I35.1 NONRHEUMATIC AORTIC VALVE INSUFFICIENCY: ICD-10-CM

## 2025-06-17 DIAGNOSIS — R55 NEAR SYNCOPE: ICD-10-CM

## 2025-06-17 DIAGNOSIS — E78.5 HYPERLIPIDEMIA, UNSPECIFIED HYPERLIPIDEMIA TYPE: ICD-10-CM

## 2025-06-17 DIAGNOSIS — R42 DIZZINESS: Primary | ICD-10-CM

## 2025-06-17 PROCEDURE — 93000 ELECTROCARDIOGRAM COMPLETE: CPT | Performed by: STUDENT IN AN ORGANIZED HEALTH CARE EDUCATION/TRAINING PROGRAM

## 2025-06-17 PROCEDURE — 1160F RVW MEDS BY RX/DR IN RCRD: CPT | Performed by: STUDENT IN AN ORGANIZED HEALTH CARE EDUCATION/TRAINING PROGRAM

## 2025-06-17 PROCEDURE — 1159F MED LIST DOCD IN RCRD: CPT | Performed by: STUDENT IN AN ORGANIZED HEALTH CARE EDUCATION/TRAINING PROGRAM

## 2025-06-17 PROCEDURE — 99204 OFFICE O/P NEW MOD 45 MIN: CPT | Performed by: STUDENT IN AN ORGANIZED HEALTH CARE EDUCATION/TRAINING PROGRAM

## 2025-06-17 RX ORDER — ROSUVASTATIN CALCIUM 20 MG/1
20 TABLET, COATED ORAL DAILY
Qty: 90 TABLET | Refills: 3 | Status: SHIPPED | OUTPATIENT
Start: 2025-06-17

## 2025-06-17 NOTE — PROGRESS NOTES
[facility]      Patient Name: Thuy Blackwood  :1955  Age: 69 y.o.  Encounter Provider:  Stalin Summers MD      Chief Complaint:   Chief Complaint   Patient presents with    new pt      Near syncope, abnormal echo         HPI  Thuy Blackwood is a 69 y.o. female who presents today for evaluation of near syncope and concern over an abnormal echocardiogram. Pt has a  history significant for aortic valve regurgitation, hyperlipidemia, varicose veins status post ablation.    For the last few months she has had significant dizziness.  She describes a sensation of a warmth coming from the feet up to her head.  She get dizzy for a few seconds and then will resolve.  Happens a few times per day but then will go several days without it.  She notes she has had more stress with her kids both getting  recently.  She otherwise has no cardiovascular symptoms.  She denies any chest pain, shortness of breath, new lower extremity edema, palpitations.  As part of the workup she had an echocardiogram that showed aortic regurgitation (moderate) with a normal ejection fraction and mild concentric hypertrophy.  But does note some dizziness with head position changes.        The following portions of the patient's history were reviewed and updated as appropriate: allergies, current medications, past family history, past medical history, past social history, past surgical history and problem list.      Previous Cardiac Testing:  TTE 2025    Left ventricular systolic function is normal. Calculated left ventricular EF = 56.2%    Left ventricular wall thickness is consistent with mild concentric hypertrophy.    Left ventricular diastolic function is consistent with (grade I) impaired relaxation.    There is moderate calcification of the aortic valve mainly affecting the right coronary cusp(s).  No hemodynamically significant aortic stenosis    Moderate aortic valve regurgitation is present.    " Mild dilation of the aortic root is present. The aortic root measures 3.8 cm.    OBJECTIVE:   Vital Signs  Vitals:    06/17/25 0831   BP: 142/82   Pulse: 78     Estimated body mass index is 23.3 kg/m² as calculated from the following:    Height as of this encounter: 165.1 cm (65\").    Weight as of this encounter: 63.5 kg (140 lb).    Physical Exam      ECG 12 Lead    Date/Time: 6/17/2025 9:16 AM  Performed by: Stalin Summers MD    Authorized by: Stalin Summers MD  Previous ECG: no previous ECG available  Rhythm: sinus rhythm  Rate: normal  QRS axis: normal    Clinical impression: normal ECG  Comments: Borderline LVH          Lipid Panel          11/14/2024    08:53 5/5/2025    11:02   Lipid Panel   Total Cholesterol 206  187    Triglycerides 80  123    HDL Cholesterol 59  53    VLDL Cholesterol 14  22    LDL Cholesterol  133  112         BUN   Date Value Ref Range Status   05/05/2025 17 8 - 27 mg/dL Final   12/21/2024 23 8 - 23 mg/dL Final     Creatinine   Date Value Ref Range Status   05/05/2025 0.89 0.57 - 1.00 mg/dL Final   12/21/2024 0.97 0.57 - 1.00 mg/dL Final     Potassium   Date Value Ref Range Status   05/05/2025 4.9 3.5 - 5.2 mmol/L Final   12/21/2024 4.0 3.5 - 5.2 mmol/L Final     ALT (SGPT)   Date Value Ref Range Status   05/05/2025 17 0 - 32 IU/L Final   12/21/2024 17 1 - 33 U/L Final     AST (SGOT)   Date Value Ref Range Status   05/05/2025 22 0 - 40 IU/L Final   12/21/2024 22 1 - 32 U/L Final           ASSESSMENT:      Diagnosis Plan   1. Dizziness  Duplex Carotid Ultrasound CAR    Holter Monitor - 72 Hour Up To 15 Days      2. Near syncope  Duplex Carotid Ultrasound CAR    Holter Monitor - 72 Hour Up To 15 Days      3. Nonrheumatic aortic valve insufficiency        4. Hyperlipidemia, unspecified hyperlipidemia type              PLAN OF CARE:     Dizziness/near syncope  Aortic regurgitation, moderate  mild mitral regurgitation  Coronary artery calcifications noted on CT, " mild  Hyperlipidemia  With coronary calcifications we will start statin therapy with goal LDL cholesterol less than 70.  Crestor 20 mg started today.    Ms. Blackwood presents for evaluation of dizziness and near syncope.  She is referred to cardiology for further evaluation.  Echocardiogram thus far showed some mild concentric hypertrophy and mild to moderate aortic regurgitation and mild mitral regurgitation.  She has no aortic stenosis.  I do not believe her aortic regurgitation is causing her symptomatology as it is generally well-tolerated and is not severe in nature.  This sensation she describes as almost more vasovagal in nature as she has this feeling of warmth overcoming her.  She does not have significant palpitations but I do think we need to screen for an arrhythmia.  She has no symptoms of obstructive coronary disease.  We will obtain a 2-week Holter monitor and a carotid Doppler.  Start rosuvastatin for lipid management.  I will have her come back in 2 months to reassess her symptoms.  In the interim she will monitor her blood pressure.  She may benefit from vestibular therapy if cardiovascular workup is negative       Discharge Medications            Accurate as of June 17, 2025  9:17 AM. If you have any questions, ask your nurse or doctor.                New Medications        Instructions Start Date   rosuvastatin 20 MG tablet  Commonly known as: CRESTOR  Started by: Stalin Summers   20 mg, Oral, Daily             Continue These Medications        Instructions Start Date   amitriptyline 25 MG tablet  Commonly known as: ELAVIL   25 mg, Oral, Every Night at Bedtime      baclofen 10 MG tablet  Commonly known as: LIORESAL   10 mg, Oral, Every Night at Bedtime      cholecalciferol 25 MCG (1000 UT) tablet  Commonly known as: VITAMIN D3   1,000 Units, Every Other Day      CINNAMON PO   1,000 mg, 2 times daily      COCONUT OIL PO   1,000 mg, 2 times daily      MAGNESIUM-POTASSIUM PO   700 mg, Nightly       multivitamin tablet tablet  Commonly known as: THERAGRAN   1 tablet, Daily      ondansetron ODT 4 MG disintegrating tablet  Commonly known as: ZOFRAN-ODT   4 mg, Oral, 4 Times Daily PRN      PROBIOTIC PO   Take  by mouth.      sodium bicarbonate 650 MG tablet   650 mg, Oral, 2 Times Daily      vitamin C 250 MG tablet  Commonly known as: ASCORBIC ACID   250 mg, Daily      zinc sulfate 220 (50 Zn) MG capsule  Commonly known as: ZINCATE   220 mg, Daily               Thank you for allowing me to participate in the care of your patient,      Sincerely,   Stalin Summers MD  [facility]  06/17/25  09:17 EDT

## 2025-06-27 DIAGNOSIS — F41.1 ANXIETY AS ACUTE REACTION TO EXCEPTIONAL STRESS: ICD-10-CM

## 2025-06-27 DIAGNOSIS — G47.09 OTHER INSOMNIA: ICD-10-CM

## 2025-06-27 DIAGNOSIS — F43.0 ANXIETY AS ACUTE REACTION TO EXCEPTIONAL STRESS: ICD-10-CM

## 2025-07-01 ENCOUNTER — HOSPITAL ENCOUNTER (OUTPATIENT)
Dept: CARDIOLOGY | Facility: HOSPITAL | Age: 70
Discharge: HOME | End: 2025-07-01
Admitting: STUDENT IN AN ORGANIZED HEALTH CARE EDUCATION/TRAINING PROGRAM
Payer: MEDICARE

## 2025-07-01 ENCOUNTER — RESULTS FOLLOW-UP (OUTPATIENT)
Dept: CARDIOLOGY | Age: 70
End: 2025-07-01
Payer: MEDICARE

## 2025-07-01 DIAGNOSIS — R55 NEAR SYNCOPE: ICD-10-CM

## 2025-07-01 DIAGNOSIS — R42 DIZZINESS: ICD-10-CM

## 2025-07-01 LAB
BH CV XLRA MEAS LEFT DIST CCA EDV: -10 CM/SEC
BH CV XLRA MEAS LEFT DIST CCA PSV: -49.5 CM/SEC
BH CV XLRA MEAS LEFT DIST ICA EDV: -27.1 CM/SEC
BH CV XLRA MEAS LEFT DIST ICA PSV: -95.1 CM/SEC
BH CV XLRA MEAS LEFT ICA/CCA RATIO: 2.56
BH CV XLRA MEAS LEFT MID ICA EDV: -28 CM/SEC
BH CV XLRA MEAS LEFT MID ICA PSV: -126.8 CM/SEC
BH CV XLRA MEAS LEFT PROX CCA EDV: 7.7 CM/SEC
BH CV XLRA MEAS LEFT PROX CCA PSV: 85.6 CM/SEC
BH CV XLRA MEAS LEFT PROX ECA PSV: -78.1 CM/SEC
BH CV XLRA MEAS LEFT PROX ICA EDV: 12.5 CM/SEC
BH CV XLRA MEAS LEFT PROX ICA PSV: 42 CM/SEC
BH CV XLRA MEAS LEFT PROX SCLA PSV: 222.3 CM/SEC
BH CV XLRA MEAS LEFT VERTEBRAL A EDV: 13.7 CM/SEC
BH CV XLRA MEAS LEFT VERTEBRAL A PSV: 64.8 CM/SEC
BH CV XLRA MEAS RIGHT DIST CCA EDV: -9.6 CM/SEC
BH CV XLRA MEAS RIGHT DIST CCA PSV: -48.1 CM/SEC
BH CV XLRA MEAS RIGHT DIST ICA EDV: -26.7 CM/SEC
BH CV XLRA MEAS RIGHT DIST ICA PSV: -98.2 CM/SEC
BH CV XLRA MEAS RIGHT ICA/CCA RATIO: 2.04
BH CV XLRA MEAS RIGHT MID ICA EDV: -31.1 CM/SEC
BH CV XLRA MEAS RIGHT MID ICA PSV: -91.9 CM/SEC
BH CV XLRA MEAS RIGHT PROX CCA PSV: 87.6 CM/SEC
BH CV XLRA MEAS RIGHT PROX ECA PSV: -61.3 CM/SEC
BH CV XLRA MEAS RIGHT PROX ICA EDV: -16.5 CM/SEC
BH CV XLRA MEAS RIGHT PROX ICA PSV: -48 CM/SEC
BH CV XLRA MEAS RIGHT PROX SCLA PSV: 216.8 CM/SEC
BH CV XLRA MEAS RIGHT VERTEBRAL A EDV: 13 CM/SEC
BH CV XLRA MEAS RIGHT VERTEBRAL A PSV: 70.2 CM/SEC
RIGHT ARM BP: NORMAL MMHG

## 2025-07-01 PROCEDURE — 93880 EXTRACRANIAL BILAT STUDY: CPT

## 2025-07-01 NOTE — PROGRESS NOTES
With moderate stenosis we would likely check again in 1 year but can discuss this further at her appointment with Dr. Summers in August.

## 2025-07-01 NOTE — TELEPHONE ENCOUNTER
Please let her know that carotid Doppler shows bilateral plaque with mild (less than 50%) narrowing of the right carotid artery and moderate (50 to 69%) narrowing of the left carotid artery.  Continue statin therapy.  Goal LDL less than 70, triglycerides less than 150, HDL greater than 50.

## 2025-07-11 ENCOUNTER — OFFICE VISIT (OUTPATIENT)
Age: 70
End: 2025-07-11
Payer: MEDICARE

## 2025-07-11 ENCOUNTER — LAB (OUTPATIENT)
Dept: LAB | Facility: HOSPITAL | Age: 70
End: 2025-07-11
Payer: MEDICARE

## 2025-07-11 VITALS
DIASTOLIC BLOOD PRESSURE: 74 MMHG | HEIGHT: 65 IN | WEIGHT: 139 LBS | HEART RATE: 74 BPM | SYSTOLIC BLOOD PRESSURE: 142 MMHG | BODY MASS INDEX: 23.16 KG/M2

## 2025-07-11 DIAGNOSIS — I44.2 COMPLETE HEART BLOCK: ICD-10-CM

## 2025-07-11 DIAGNOSIS — R42 DIZZINESS: Primary | ICD-10-CM

## 2025-07-11 DIAGNOSIS — R55 NEAR SYNCOPE: ICD-10-CM

## 2025-07-11 DIAGNOSIS — R42 DIZZINESS: ICD-10-CM

## 2025-07-11 LAB
ALBUMIN SERPL-MCNC: 4.3 G/DL (ref 3.5–5.2)
ALBUMIN/GLOB SERPL: 1.4 G/DL
ALP SERPL-CCNC: 63 U/L (ref 39–117)
ALT SERPL W P-5'-P-CCNC: 18 U/L (ref 1–33)
ANION GAP SERPL CALCULATED.3IONS-SCNC: 10 MMOL/L (ref 5–15)
AST SERPL-CCNC: 26 U/L (ref 1–32)
BASOPHILS # BLD AUTO: 0.03 10*3/MM3 (ref 0–0.2)
BASOPHILS NFR BLD AUTO: 0.5 % (ref 0–1.5)
BILIRUB SERPL-MCNC: 0.4 MG/DL (ref 0–1.2)
BUN SERPL-MCNC: 13 MG/DL (ref 8–23)
BUN/CREAT SERPL: 15.1 (ref 7–25)
CALCIUM SPEC-SCNC: 9.4 MG/DL (ref 8.6–10.5)
CHLORIDE SERPL-SCNC: 102 MMOL/L (ref 98–107)
CO2 SERPL-SCNC: 25 MMOL/L (ref 22–29)
CREAT SERPL-MCNC: 0.86 MG/DL (ref 0.57–1)
DEPRECATED RDW RBC AUTO: 45.7 FL (ref 37–54)
EGFRCR SERPLBLD CKD-EPI 2021: 72.3 ML/MIN/1.73
EOSINOPHIL # BLD AUTO: 0.12 10*3/MM3 (ref 0–0.4)
EOSINOPHIL NFR BLD AUTO: 2.1 % (ref 0.3–6.2)
ERYTHROCYTE [DISTWIDTH] IN BLOOD BY AUTOMATED COUNT: 13 % (ref 12.3–15.4)
GLOBULIN UR ELPH-MCNC: 3.1 GM/DL
GLUCOSE SERPL-MCNC: 98 MG/DL (ref 65–99)
HCT VFR BLD AUTO: 38.9 % (ref 34–46.6)
HGB BLD-MCNC: 12.6 G/DL (ref 12–15.9)
IMM GRANULOCYTES # BLD AUTO: 0.02 10*3/MM3 (ref 0–0.05)
IMM GRANULOCYTES NFR BLD AUTO: 0.4 % (ref 0–0.5)
LYMPHOCYTES # BLD AUTO: 1.24 10*3/MM3 (ref 0.7–3.1)
LYMPHOCYTES NFR BLD AUTO: 22 % (ref 19.6–45.3)
MCH RBC QN AUTO: 31 PG (ref 26.6–33)
MCHC RBC AUTO-ENTMCNC: 32.4 G/DL (ref 31.5–35.7)
MCV RBC AUTO: 95.8 FL (ref 79–97)
MONOCYTES # BLD AUTO: 0.68 10*3/MM3 (ref 0.1–0.9)
MONOCYTES NFR BLD AUTO: 12.1 % (ref 5–12)
NEUTROPHILS NFR BLD AUTO: 3.55 10*3/MM3 (ref 1.7–7)
NEUTROPHILS NFR BLD AUTO: 62.9 % (ref 42.7–76)
NRBC BLD AUTO-RTO: 0 /100 WBC (ref 0–0.2)
PLATELET # BLD AUTO: 318 10*3/MM3 (ref 140–450)
PMV BLD AUTO: 9.1 FL (ref 6–12)
POTASSIUM SERPL-SCNC: 4.2 MMOL/L (ref 3.5–5.2)
PROT SERPL-MCNC: 7.4 G/DL (ref 6–8.5)
RBC # BLD AUTO: 4.06 10*6/MM3 (ref 3.77–5.28)
SODIUM SERPL-SCNC: 137 MMOL/L (ref 136–145)
WBC NRBC COR # BLD AUTO: 5.64 10*3/MM3 (ref 3.4–10.8)

## 2025-07-11 PROCEDURE — 80053 COMPREHEN METABOLIC PANEL: CPT

## 2025-07-11 PROCEDURE — 85025 COMPLETE CBC W/AUTO DIFF WBC: CPT

## 2025-07-11 PROCEDURE — 36415 COLL VENOUS BLD VENIPUNCTURE: CPT

## 2025-07-11 NOTE — PROGRESS NOTES
[facility] CARDIOLOGY  Clinical Cardiac Electrophysiology     Date of consultation: 7/11/2025  Referring Provider: No referring provider defined for this encounter.     Reason for consultation: Sick sinus syndrome, intermittent complete heart block      History of Presenting Illness     OFFICE VISIT  Thuy Blackwood is a 69 y.o. female with a past medical history of aortic valve regurgitation, hyperlipidemia, varicose veins s/p ablation who presents for new patient evaluation.      Patient presents today with .  Patient notes that for the past 2-3's months, she has been having increasing episodes of lightheadedness and dizziness that just happen all of a sudden.  They do not seem to have some rhyme or reason, but do seem to happen mostly in the morning.  Many times they happen when she is just sitting and not doing anything.    She is at the very beginning, she did have 1 episode where she passed out at home for a few seconds and then came to.  She says it happened suddenly and and then when she woke up she wondered what happened, but had only been passed out for just a few seconds.  Fortunately she did not injure herself at that time.     Past Medical History     Past Medical History:   Diagnosis Date    Acute sinusitis     Anemia     Anesthesia complication     Arthritis     Breast cancer     Left    Cholelithiasis     Colon cancer screening 04/11/2019    Negative Cologuard    Diverticulosis     H/O bone density study 08/2016    [facility]     H/O Breast cancer     Left, 23 years ago.    H/O complete eye exam 2014    Hyperlipidemia     Osteopenia     Personal history of malignant neoplasm of breast     PONV (postoperative nausea and vomiting)     Renal insufficiency     Unspecified kidney failure     UTI (urinary tract infection)     Varicose veins of bilateral lower extremities with pain     Venous insufficiency (chronic) (peripheral)      Past Surgical History:    Procedure Laterality Date    BLADDER REPAIR      BLADDER SUSPENSION      BREAST SURGERY      Breast cancer    COLONOSCOPY N/A 04/29/2021    Procedure: COLONOSCOPY to cecum and TI with cold bx;  Surgeon: Keke Yarbrough MD;  Location:  [facility];  Service: General;  Laterality: N/A;  pre - diarrhea, screening  post - internal hemorrhoids    ENDOSCOPY N/A 04/29/2021    Procedure: ESOPHAGOGASTRODUODENOSCOPY with cold bx;  Surgeon: Keke Yarbrough MD;  Location: [facility] ENDOSCOPY;  Service: General;  Laterality: N/A;  pre - duodenal diverticulum, diarrhea  post - bile reflux. duodenum diverticuli, antrum gastritis    ENDOSCOPY AND COLONOSCOPY  06/2010    Upper and lower GI endoscopies performed by Dr. Lawton showed gastritis but no other pathological diagnosis made.    EYE SURGERY      HAND SURGERY      HYSTERECTOMY  1995    partial; ovaries in situ    LAPAROSCOPIC CHOLECYSTECTOMY N/A 06/28/2010    Dr. Shawn Agarwal    MAMMO BILATERAL  08/2016    [facility] does breast exams    MASTECTOMY Left 1992    PAP SMEAR  scheduled    p hyst . dr nora dozier    SUBTOTAL HYSTERECTOMY      TUBAL ABDOMINAL LIGATION  1986    VENOUS ABLATION CHEMICAL  05/09/2025    Right Varithena Ablation         Medications     Current Outpatient Medications on File Prior to Visit   Medication Sig Dispense Refill    amitriptyline (ELAVIL) 25 MG tablet TAKE 1 TABLET BY MOUTH EVERY DAY AT NIGHT 30 tablet 0    baclofen (LIORESAL) 10 MG tablet TAKE 1 TABLET BY MOUTH EVERYDAY AT BEDTIME 90 tablet 3    cholecalciferol (VITAMIN D3) 1000 UNITS tablet Take 1 tablet by mouth Every Other Day.      CINNAMON PO Take 1,000 mg by mouth 2 (two) times a day.      COCONUT OIL PO Take 1,000 mg by mouth 2 (two) times a day.      MAGNESIUM-POTASSIUM PO Take 700 mg by mouth Every Night.      Multiple Vitamin (MULTI-VITAMIN DAILY PO) Take 1 tablet by mouth Daily.      ondansetron ODT (ZOFRAN-ODT) 4 MG disintegrating tablet Take 1 tablet by mouth 4  "(Four) Times a Day As Needed for Nausea or Vomiting. 30 tablet 0    Probiotic Product (PROBIOTIC PO) Take  by mouth.      rosuvastatin (CRESTOR) 20 MG tablet Take 1 tablet by mouth Daily. 90 tablet 3    sodium bicarbonate 650 MG tablet Take 1 tablet by mouth 2 (Two) Times a Day. 180 tablet 3    vitamin C (ASCORBIC ACID) 250 MG tablet Take 1 tablet by mouth Daily.      zinc sulfate (ZINCATE) 220 (50 Zn) MG capsule Take 1 capsule by mouth Daily.       No current facility-administered medications on file prior to visit.           Allergies     Allergies   Allergen Reactions    Doxycycline Hyclate Myalgia    Levaquin [Levofloxacin] Unknown - Low Severity     Makes me very anxious, jittery           Family History     Family History   Problem Relation Age of Onset    Arthritis Mother     Heart disease Mother     Diabetes Father     Heart disease Father     Depression Other     Heart disease Other     Hypertension Other     Heart attack Other     Rheum arthritis Other     Cancer Other     Malig Hyperthermia Neg Hx            Social History     Social History     Socioeconomic History    Marital status:    Tobacco Use    Smoking status: Never     Passive exposure: Never    Smokeless tobacco: Never   Vaping Use    Vaping status: Never Used   Substance and Sexual Activity    Alcohol use: Yes     Alcohol/week: 3.0 standard drinks of alcohol     Types: 2 Glasses of wine, 1 Cans of beer per week     Comment: occasional    Drug use: No    Sexual activity: Yes     Partners: Male        Review of Systems: All systems reviewed. Pertinent positives identified in HPI. All other systems are negative.         Physical Examination     Vitals:    07/11/25 0846   BP: 142/74   BP Location: Right arm   Patient Position: Sitting   Cuff Size: Adult   Pulse: 74   Weight: 63 kg (139 lb)   Height: 165.1 cm (65\")     Body mass index is 23.13 kg/m².     Gen: Well nourished; Alert  Skin: no visible rashes and no abnormalities with " "palpation  Eyes: no evidence of visual defects and normal sclera  Ears: no abnormalities.  Mouth: normal teeth and appropriately moist mucous membranes  Chest: clear to auscultation. There is normal respiratory effort and expansion.  CV: examination showed a regular rhythm. S1 and S2 were normal.   Abd: no visible distension.   Neurologic:Cranial nerves appear intact; Sensation normal; no localizing signs        Laboratory Studies (Reviewed by provider)    Lab Results   Component Value Date    WBC 4.5 05/05/2025    HGB 12.8 05/05/2025    HCT 39.3 05/05/2025    MCV 95 05/05/2025     05/05/2025     Lab Results   Component Value Date    GLUCOSE 86 05/05/2025    BUN 17 05/05/2025    CO2 23 05/05/2025    CREATININE 0.89 05/05/2025    K 4.9 05/05/2025     05/05/2025     05/05/2025    CALCIUM 9.5 05/05/2025     Lab Results   Component Value Date    MG 2.0 05/21/2021     Lab Results   Component Value Date    PHOS 3.8 03/19/2021     Lab Results   Component Value Date    ALT 17 05/05/2025    AST 22 05/05/2025    ALKPHOS 64 05/05/2025    BILITOT 0.4 05/05/2025     Lab Results   Component Value Date    TRIG 123 05/05/2025    HDL 53 05/05/2025     (H) 05/05/2025     No results found for: \"PTT\", \"INR\"  No results found for: \"HGBA1C\"  Lab Results   Component Value Date    TSH 2.690 05/05/2025        Investigations (Reviewed by provider)    EKG 07/11/25:   ECG 12 Lead    Date/Time: 7/11/2025 1:12 PM  Performed by: Gavino Harrison MD    Authorized by: Gavino Harrison MD  Comparison: compared with previous ECG from 6/17/2025  Similar to previous ECG  Rhythm: sinus rhythm  Other findings: left atrial abnormality    Clinical impression: normal ECG           ECHOCARDIOGRAPHY: 5/28/2025:    Left ventricular systolic function is normal. Calculated left ventricular EF = 56.2%    Left ventricular wall thickness is consistent with mild concentric hypertrophy.    Left ventricular diastolic function is consistent with " (grade I) impaired relaxation.    There is moderate calcification of the aortic valve mainly affecting the right coronary cusp(s).  No hemodynamically significant aortic stenosis    Moderate aortic valve regurgitation is present.    Mild dilation of the aortic root is present. The aortic root measures 3.8 cm.     HOLTER/EVENT MONITORIN2025  -  An abnormal monitor study.    Sinus rhythm throughout the study with an average heart rate of 83 bpm.  There were significant sinus pauses with 12 pauses of 3 to 5.5 seconds.  These were associated with symptoms.  There were also 21 episodes of SVT with the longest lasting 16 seconds         Duplex carotid 2025:      Right internal carotid artery demonstrates a less than 50% stenosis.    Antegrade right vertebral flow.    Left internal carotid artery demonstrates a 50-69% stenosis.    Antegrade left vertebral flow.       Assessment & Plan:    # Sick sinus syndrome, highly symptomatic with multiple lightheadedness/dizziness episodes  - Multiple symptomatic sinus pauses on event monitor from 2025 with a representative strip above.  - Discussed the risks of PPM implantation, including approximately 1% risk of heart attack, stroke, injury to the heart/lungs/blood vessels. Described that the risk of severe injury/illness/death due to the procedure is very small.  - Utilizing shared decision making with family and patient, patient elects to proceed with elective PPM implantation.    Follow up in 1 months post-procedure with NP, 3 months post-procedure with me.   Plan: patient elects to proceed with elective PPM implantation.     As always, Dr. Jazlyn squires. provider found, it has been a pleasure to participate in your patient's care. We will continue to follow the patient in our clinic.     This time spent caring for Thuy Blackwood on this date of service includes time spent by me in the following activities:preparing for the visit, reviewing tests, obtaining and/or  reviewing a separately obtained history, performing a medically appropriate examination and/or evaluation, counseling and educating the patient/family/caregiver, documenting information in the medical record, and independently interpreting results and communicating that information with the patient/family/caregiver    Gavino Harrison MD  Clinical Cardiac Electrophysiology  [facility]

## 2025-07-11 NOTE — H&P (VIEW-ONLY)
[facility] CARDIOLOGY  Clinical Cardiac Electrophysiology     Date of consultation: 7/11/2025  Referring Provider: No referring provider defined for this encounter.     Reason for consultation: Sick sinus syndrome, intermittent complete heart block      History of Presenting Illness     OFFICE VISIT  Thuy Blackwood is a 69 y.o. female with a past medical history of aortic valve regurgitation, hyperlipidemia, varicose veins s/p ablation who presents for new patient evaluation.      Patient presents today with .  Patient notes that for the past 2-3's months, she has been having increasing episodes of lightheadedness and dizziness that just happen all of a sudden.  They do not seem to have some rhyme or reason, but do seem to happen mostly in the morning.  Many times they happen when she is just sitting and not doing anything.    She is at the very beginning, she did have 1 episode where she passed out at home for a few seconds and then came to.  She says it happened suddenly and and then when she woke up she wondered what happened, but had only been passed out for just a few seconds.  Fortunately she did not injure herself at that time.     Past Medical History     Past Medical History:   Diagnosis Date    Acute sinusitis     Anemia     Anesthesia complication     Arthritis     Breast cancer     Left    Cholelithiasis     Colon cancer screening 04/11/2019    Negative Cologuard    Diverticulosis     H/O bone density study 08/2016    [facility]     H/O Breast cancer     Left, 23 years ago.    H/O complete eye exam 2014    Hyperlipidemia     Osteopenia     Personal history of malignant neoplasm of breast     PONV (postoperative nausea and vomiting)     Renal insufficiency     Unspecified kidney failure     UTI (urinary tract infection)     Varicose veins of bilateral lower extremities with pain     Venous insufficiency (chronic) (peripheral)      Past Surgical History:    Procedure Laterality Date    BLADDER REPAIR      BLADDER SUSPENSION      BREAST SURGERY      Breast cancer    COLONOSCOPY N/A 04/29/2021    Procedure: COLONOSCOPY to cecum and TI with cold bx;  Surgeon: Keke Yarbrough MD;  Location:  [facility];  Service: General;  Laterality: N/A;  pre - diarrhea, screening  post - internal hemorrhoids    ENDOSCOPY N/A 04/29/2021    Procedure: ESOPHAGOGASTRODUODENOSCOPY with cold bx;  Surgeon: Keke Yarbrough MD;  Location: [facility] ENDOSCOPY;  Service: General;  Laterality: N/A;  pre - duodenal diverticulum, diarrhea  post - bile reflux. duodenum diverticuli, antrum gastritis    ENDOSCOPY AND COLONOSCOPY  06/2010    Upper and lower GI endoscopies performed by Dr. Lawton showed gastritis but no other pathological diagnosis made.    EYE SURGERY      HAND SURGERY      HYSTERECTOMY  1995    partial; ovaries in situ    LAPAROSCOPIC CHOLECYSTECTOMY N/A 06/28/2010    Dr. Shawn Agarwal    MAMMO BILATERAL  08/2016    [facility] does breast exams    MASTECTOMY Left 1992    PAP SMEAR  scheduled    p hyst . dr nora dozier    SUBTOTAL HYSTERECTOMY      TUBAL ABDOMINAL LIGATION  1986    VENOUS ABLATION CHEMICAL  05/09/2025    Right Varithena Ablation         Medications     Current Outpatient Medications on File Prior to Visit   Medication Sig Dispense Refill    amitriptyline (ELAVIL) 25 MG tablet TAKE 1 TABLET BY MOUTH EVERY DAY AT NIGHT 30 tablet 0    baclofen (LIORESAL) 10 MG tablet TAKE 1 TABLET BY MOUTH EVERYDAY AT BEDTIME 90 tablet 3    cholecalciferol (VITAMIN D3) 1000 UNITS tablet Take 1 tablet by mouth Every Other Day.      CINNAMON PO Take 1,000 mg by mouth 2 (two) times a day.      COCONUT OIL PO Take 1,000 mg by mouth 2 (two) times a day.      MAGNESIUM-POTASSIUM PO Take 700 mg by mouth Every Night.      Multiple Vitamin (MULTI-VITAMIN DAILY PO) Take 1 tablet by mouth Daily.      ondansetron ODT (ZOFRAN-ODT) 4 MG disintegrating tablet Take 1 tablet by mouth 4  "(Four) Times a Day As Needed for Nausea or Vomiting. 30 tablet 0    Probiotic Product (PROBIOTIC PO) Take  by mouth.      rosuvastatin (CRESTOR) 20 MG tablet Take 1 tablet by mouth Daily. 90 tablet 3    sodium bicarbonate 650 MG tablet Take 1 tablet by mouth 2 (Two) Times a Day. 180 tablet 3    vitamin C (ASCORBIC ACID) 250 MG tablet Take 1 tablet by mouth Daily.      zinc sulfate (ZINCATE) 220 (50 Zn) MG capsule Take 1 capsule by mouth Daily.       No current facility-administered medications on file prior to visit.           Allergies     Allergies   Allergen Reactions    Doxycycline Hyclate Myalgia    Levaquin [Levofloxacin] Unknown - Low Severity     Makes me very anxious, jittery           Family History     Family History   Problem Relation Age of Onset    Arthritis Mother     Heart disease Mother     Diabetes Father     Heart disease Father     Depression Other     Heart disease Other     Hypertension Other     Heart attack Other     Rheum arthritis Other     Cancer Other     Malig Hyperthermia Neg Hx            Social History     Social History     Socioeconomic History    Marital status:    Tobacco Use    Smoking status: Never     Passive exposure: Never    Smokeless tobacco: Never   Vaping Use    Vaping status: Never Used   Substance and Sexual Activity    Alcohol use: Yes     Alcohol/week: 3.0 standard drinks of alcohol     Types: 2 Glasses of wine, 1 Cans of beer per week     Comment: occasional    Drug use: No    Sexual activity: Yes     Partners: Male        Review of Systems: All systems reviewed. Pertinent positives identified in HPI. All other systems are negative.         Physical Examination     Vitals:    07/11/25 0846   BP: 142/74   BP Location: Right arm   Patient Position: Sitting   Cuff Size: Adult   Pulse: 74   Weight: 63 kg (139 lb)   Height: 165.1 cm (65\")     Body mass index is 23.13 kg/m².     Gen: Well nourished; Alert  Skin: no visible rashes and no abnormalities with " "palpation  Eyes: no evidence of visual defects and normal sclera  Ears: no abnormalities.  Mouth: normal teeth and appropriately moist mucous membranes  Chest: clear to auscultation. There is normal respiratory effort and expansion.  CV: examination showed a regular rhythm. S1 and S2 were normal.   Abd: no visible distension.   Neurologic:Cranial nerves appear intact; Sensation normal; no localizing signs        Laboratory Studies (Reviewed by provider)    Lab Results   Component Value Date    WBC 4.5 05/05/2025    HGB 12.8 05/05/2025    HCT 39.3 05/05/2025    MCV 95 05/05/2025     05/05/2025     Lab Results   Component Value Date    GLUCOSE 86 05/05/2025    BUN 17 05/05/2025    CO2 23 05/05/2025    CREATININE 0.89 05/05/2025    K 4.9 05/05/2025     05/05/2025     05/05/2025    CALCIUM 9.5 05/05/2025     Lab Results   Component Value Date    MG 2.0 05/21/2021     Lab Results   Component Value Date    PHOS 3.8 03/19/2021     Lab Results   Component Value Date    ALT 17 05/05/2025    AST 22 05/05/2025    ALKPHOS 64 05/05/2025    BILITOT 0.4 05/05/2025     Lab Results   Component Value Date    TRIG 123 05/05/2025    HDL 53 05/05/2025     (H) 05/05/2025     No results found for: \"PTT\", \"INR\"  No results found for: \"HGBA1C\"  Lab Results   Component Value Date    TSH 2.690 05/05/2025        Investigations (Reviewed by provider)    EKG 07/11/25:   ECG 12 Lead    Date/Time: 7/11/2025 1:12 PM  Performed by: Gavino Harrison MD    Authorized by: Gavino Harrison MD  Comparison: compared with previous ECG from 6/17/2025  Similar to previous ECG  Rhythm: sinus rhythm  Other findings: left atrial abnormality    Clinical impression: normal ECG           ECHOCARDIOGRAPHY: 5/28/2025:    Left ventricular systolic function is normal. Calculated left ventricular EF = 56.2%    Left ventricular wall thickness is consistent with mild concentric hypertrophy.    Left ventricular diastolic function is consistent with " (grade I) impaired relaxation.    There is moderate calcification of the aortic valve mainly affecting the right coronary cusp(s).  No hemodynamically significant aortic stenosis    Moderate aortic valve regurgitation is present.    Mild dilation of the aortic root is present. The aortic root measures 3.8 cm.     HOLTER/EVENT MONITORIN2025  -  An abnormal monitor study.    Sinus rhythm throughout the study with an average heart rate of 83 bpm.  There were significant sinus pauses with 12 pauses of 3 to 5.5 seconds.  These were associated with symptoms.  There were also 21 episodes of SVT with the longest lasting 16 seconds         Duplex carotid 2025:      Right internal carotid artery demonstrates a less than 50% stenosis.    Antegrade right vertebral flow.    Left internal carotid artery demonstrates a 50-69% stenosis.    Antegrade left vertebral flow.       Assessment & Plan:    # Sick sinus syndrome, highly symptomatic with multiple lightheadedness/dizziness episodes  - Multiple symptomatic sinus pauses on event monitor from 2025 with a representative strip above.  - Discussed the risks of PPM implantation, including approximately 1% risk of heart attack, stroke, injury to the heart/lungs/blood vessels. Described that the risk of severe injury/illness/death due to the procedure is very small.  - Utilizing shared decision making with family and patient, patient elects to proceed with elective PPM implantation.    Follow up in 1 months post-procedure with NP, 3 months post-procedure with me.   Plan: patient elects to proceed with elective PPM implantation.     As always, Dr. Jazlyn squires. provider found, it has been a pleasure to participate in your patient's care. We will continue to follow the patient in our clinic.     This time spent caring for Thuy Blackwood on this date of service includes time spent by me in the following activities:preparing for the visit, reviewing tests, obtaining and/or  reviewing a separately obtained history, performing a medically appropriate examination and/or evaluation, counseling and educating the patient/family/caregiver, documenting information in the medical record, and independently interpreting results and communicating that information with the patient/family/caregiver    Gavino Harrison MD  Clinical Cardiac Electrophysiology  Psych[facility]

## 2025-07-14 ENCOUNTER — HOSPITAL ENCOUNTER (OUTPATIENT)
Facility: HOSPITAL | Age: 70
Setting detail: HOSPITAL OUTPATIENT SURGERY
Discharge: HOME | End: 2025-07-14
Attending: STUDENT IN AN ORGANIZED HEALTH CARE EDUCATION/TRAINING PROGRAM | Admitting: STUDENT IN AN ORGANIZED HEALTH CARE EDUCATION/TRAINING PROGRAM
Payer: MEDICARE

## 2025-07-14 ENCOUNTER — APPOINTMENT (OUTPATIENT)
Dept: GENERAL RADIOLOGY | Facility: HOSPITAL | Age: 70
End: 2025-07-14
Payer: MEDICARE

## 2025-07-14 VITALS
WEIGHT: 139 LBS | OXYGEN SATURATION: 99 % | SYSTOLIC BLOOD PRESSURE: 156 MMHG | HEIGHT: 65 IN | HEART RATE: 74 BPM | TEMPERATURE: 97.7 F | DIASTOLIC BLOOD PRESSURE: 85 MMHG | RESPIRATION RATE: 16 BRPM | BODY MASS INDEX: 23.16 KG/M2

## 2025-07-14 DIAGNOSIS — R42 DIZZINESS: ICD-10-CM

## 2025-07-14 DIAGNOSIS — I44.2 COMPLETE HEART BLOCK: ICD-10-CM

## 2025-07-14 DIAGNOSIS — R55 NEAR SYNCOPE: ICD-10-CM

## 2025-07-14 LAB
QT INTERVAL: 389 MS
QTC INTERVAL: 458 MS

## 2025-07-14 PROCEDURE — C1769 GUIDE WIRE: HCPCS | Performed by: STUDENT IN AN ORGANIZED HEALTH CARE EDUCATION/TRAINING PROGRAM

## 2025-07-14 PROCEDURE — 25010000002 CEFAZOLIN PER 500 MG: Performed by: STUDENT IN AN ORGANIZED HEALTH CARE EDUCATION/TRAINING PROGRAM

## 2025-07-14 PROCEDURE — C1887 CATHETER, GUIDING: HCPCS | Performed by: STUDENT IN AN ORGANIZED HEALTH CARE EDUCATION/TRAINING PROGRAM

## 2025-07-14 PROCEDURE — 33208 INSRT HEART PM ATRIAL & VENT: CPT | Performed by: STUDENT IN AN ORGANIZED HEALTH CARE EDUCATION/TRAINING PROGRAM

## 2025-07-14 PROCEDURE — 25010000002 ONDANSETRON PER 1 MG: Performed by: STUDENT IN AN ORGANIZED HEALTH CARE EDUCATION/TRAINING PROGRAM

## 2025-07-14 PROCEDURE — C1785 PMKR, DUAL, RATE-RESP: HCPCS | Performed by: STUDENT IN AN ORGANIZED HEALTH CARE EDUCATION/TRAINING PROGRAM

## 2025-07-14 PROCEDURE — 25810000003 SODIUM CHLORIDE 0.9 % SOLUTION: Performed by: STUDENT IN AN ORGANIZED HEALTH CARE EDUCATION/TRAINING PROGRAM

## 2025-07-14 PROCEDURE — 99152 MOD SED SAME PHYS/QHP 5/>YRS: CPT | Performed by: STUDENT IN AN ORGANIZED HEALTH CARE EDUCATION/TRAINING PROGRAM

## 2025-07-14 PROCEDURE — C1898 LEAD, PMKR, OTHER THAN TRANS: HCPCS | Performed by: STUDENT IN AN ORGANIZED HEALTH CARE EDUCATION/TRAINING PROGRAM

## 2025-07-14 PROCEDURE — 71045 X-RAY EXAM CHEST 1 VIEW: CPT

## 2025-07-14 PROCEDURE — C1894 INTRO/SHEATH, NON-LASER: HCPCS | Performed by: STUDENT IN AN ORGANIZED HEALTH CARE EDUCATION/TRAINING PROGRAM

## 2025-07-14 PROCEDURE — 93005 ELECTROCARDIOGRAM TRACING: CPT | Performed by: STUDENT IN AN ORGANIZED HEALTH CARE EDUCATION/TRAINING PROGRAM

## 2025-07-14 PROCEDURE — 25010000002 FENTANYL CITRATE (PF) 50 MCG/ML SOLUTION: Performed by: STUDENT IN AN ORGANIZED HEALTH CARE EDUCATION/TRAINING PROGRAM

## 2025-07-14 PROCEDURE — 25010000002 LIDOCAINE 1 % SOLUTION: Performed by: STUDENT IN AN ORGANIZED HEALTH CARE EDUCATION/TRAINING PROGRAM

## 2025-07-14 PROCEDURE — 25010000002 MIDAZOLAM PER 1 MG: Performed by: STUDENT IN AN ORGANIZED HEALTH CARE EDUCATION/TRAINING PROGRAM

## 2025-07-14 PROCEDURE — 93010 ELECTROCARDIOGRAM REPORT: CPT | Performed by: INTERNAL MEDICINE

## 2025-07-14 PROCEDURE — 25010000002 METOPROLOL TARTRATE 5 MG/5ML SOLUTION: Performed by: STUDENT IN AN ORGANIZED HEALTH CARE EDUCATION/TRAINING PROGRAM

## 2025-07-14 PROCEDURE — C1889 IMPLANT/INSERT DEVICE, NOC: HCPCS | Performed by: STUDENT IN AN ORGANIZED HEALTH CARE EDUCATION/TRAINING PROGRAM

## 2025-07-14 PROCEDURE — 99153 MOD SED SAME PHYS/QHP EA: CPT | Performed by: STUDENT IN AN ORGANIZED HEALTH CARE EDUCATION/TRAINING PROGRAM

## 2025-07-14 DEVICE — FLOSEAL WITH RECOTHROM - 10ML.
Type: IMPLANTABLE DEVICE | Status: FUNCTIONAL
Brand: FLOSEAL HEMOSTATIC MATRIX

## 2025-07-14 DEVICE — ENV PM AIGISRX ANTIBAC RESORB 2.9X3.3IN LG: Type: IMPLANTABLE DEVICE | Status: FUNCTIONAL

## 2025-07-14 DEVICE — IMPLANTABLE DEVICE: Type: IMPLANTABLE DEVICE | Status: FUNCTIONAL

## 2025-07-14 DEVICE — LD PM CAPSUREFIX NOVUS5076 52CM: Type: IMPLANTABLE DEVICE | Status: FUNCTIONAL

## 2025-07-14 DEVICE — GEN PM AZURE S SURESCAN DR MRI: Type: IMPLANTABLE DEVICE | Status: FUNCTIONAL

## 2025-07-14 RX ORDER — NITROGLYCERIN 0.4 MG/1
0.4 TABLET SUBLINGUAL
Status: DISCONTINUED | OUTPATIENT
Start: 2025-07-14 | End: 2025-07-14 | Stop reason: HOSPADM

## 2025-07-14 RX ORDER — MIDAZOLAM HYDROCHLORIDE 1 MG/ML
INJECTION, SOLUTION INTRAMUSCULAR; INTRAVENOUS
Status: DISCONTINUED | OUTPATIENT
Start: 2025-07-14 | End: 2025-07-14 | Stop reason: HOSPADM

## 2025-07-14 RX ORDER — SODIUM CHLORIDE 0.9 % (FLUSH) 0.9 %
10 SYRINGE (ML) INJECTION AS NEEDED
Status: DISCONTINUED | OUTPATIENT
Start: 2025-07-14 | End: 2025-07-14 | Stop reason: HOSPADM

## 2025-07-14 RX ORDER — FENTANYL CITRATE 50 UG/ML
INJECTION, SOLUTION INTRAMUSCULAR; INTRAVENOUS
Status: DISCONTINUED | OUTPATIENT
Start: 2025-07-14 | End: 2025-07-14 | Stop reason: HOSPADM

## 2025-07-14 RX ORDER — METOPROLOL TARTRATE 1 MG/ML
INJECTION, SOLUTION INTRAVENOUS
Status: DISCONTINUED | OUTPATIENT
Start: 2025-07-14 | End: 2025-07-14 | Stop reason: HOSPADM

## 2025-07-14 RX ORDER — METHOHEXITAL IN WATER/PF 100MG/10ML
SYRINGE (ML) INTRAVENOUS
Status: DISCONTINUED | OUTPATIENT
Start: 2025-07-14 | End: 2025-07-14 | Stop reason: HOSPADM

## 2025-07-14 RX ORDER — LIDOCAINE HYDROCHLORIDE 10 MG/ML
INJECTION, SOLUTION INFILTRATION; PERINEURAL
Status: DISCONTINUED | OUTPATIENT
Start: 2025-07-14 | End: 2025-07-14 | Stop reason: HOSPADM

## 2025-07-14 RX ORDER — SODIUM CHLORIDE 0.9 % (FLUSH) 0.9 %
10 SYRINGE (ML) INJECTION EVERY 12 HOURS SCHEDULED
Status: DISCONTINUED | OUTPATIENT
Start: 2025-07-14 | End: 2025-07-14 | Stop reason: HOSPADM

## 2025-07-14 RX ORDER — SODIUM CHLORIDE 9 MG/ML
75 INJECTION, SOLUTION INTRAVENOUS CONTINUOUS
Status: DISCONTINUED | OUTPATIENT
Start: 2025-07-14 | End: 2025-07-14 | Stop reason: HOSPADM

## 2025-07-14 RX ORDER — ONDANSETRON 2 MG/ML
INJECTION INTRAMUSCULAR; INTRAVENOUS
Status: DISCONTINUED | OUTPATIENT
Start: 2025-07-14 | End: 2025-07-14 | Stop reason: HOSPADM

## 2025-07-14 RX ADMIN — SODIUM CHLORIDE 75 ML/HR: 9 INJECTION, SOLUTION INTRAVENOUS at 09:32

## 2025-07-14 NOTE — DISCHARGE INSTRUCTIONS
"[facility]   [phone]    Dr Harrison's Post Pacemaker / Defibrillator Implant Instructions      1.  If there is an outer gauze dressing, it may be removed the next day.      2.  If your incision was closed using skin glue, the surgical glue will flake off on its own over time.  Do not pick it off.     3. If steri-strips were used, they should not be removed. Allow them to \"fall off\".      4. You may shower after 48 hours. Do not allow shower water to hit directly on incision.    5. No lotion/powder/ointment/cream on incision until it is healed.    6. Gently wash incision daily with soap and water and pat dry.    7. You may reapply a dressing if there is drainage, otherwise leave your incision open to air. If you reapply a dressing, please notify the pacemaker clinic.    8. No heavy lifting, pulling, or pushing >10 lbs for 6 weeks.    9. Do not raise the affected arm over your head for a minimum of 6 weeks.    10. The pacemaker clinic will contact you (usually within 1 business day) to schedule a pacemaker/incision check. The check is usually done 7-10 days post-implant. If you have not heard from the pacemaker clinic within 3 days, please call the office.    11.  No driving until seen at your follow up appointment    12. Please call the office if you experience any of the following:   bleeding or drainage from your incision   swelling, redness, or opening of your incision   fever or chills   pain not relieved with medication   chest pain or difficulty breathing   lightheadness    13. For defibrillator patients only: If you receive a shock from your device, please call the office. If you receive 2 or more shocks within a 24 hour period OR if you receive 1 shock and feel poorly, you should be evaluated in the emergency room. Please DO NOT DRIVE if you have received a shock until your device has been checked.  "

## 2025-07-15 ENCOUNTER — TELEPHONE (OUTPATIENT)
Age: 70
End: 2025-07-15

## 2025-07-15 DIAGNOSIS — Z95.0 S/P PLACEMENT OF CARDIAC PACEMAKER: ICD-10-CM

## 2025-07-15 DIAGNOSIS — I44.2 COMPLETE HEART BLOCK: Primary | ICD-10-CM

## 2025-07-15 RX ORDER — OXYCODONE HYDROCHLORIDE 5 MG/1
5 CAPSULE ORAL EVERY 6 HOURS PRN
Qty: 12 CAPSULE | Refills: 0 | Status: SHIPPED | OUTPATIENT
Start: 2025-07-15 | End: 2025-07-18

## 2025-07-16 LAB
MC_CV_MDC_IDC_RATE_1: 150
MC_CV_MDC_IDC_RATE_1: 171
MC_CV_MDC_IDC_ZONE_ID: 2
MC_CV_MDC_IDC_ZONE_ID: 6
MDC_IDC_MSMT_BATTERY_REMAINING_LONGEVITY: 184 MO
MDC_IDC_MSMT_BATTERY_RRT_TRIGGER: 2.62
MDC_IDC_MSMT_BATTERY_STATUS: NORMAL
MDC_IDC_MSMT_BATTERY_VOLTAGE: 3.21
MDC_IDC_MSMT_LEADCHNL_RA_DTM: NORMAL
MDC_IDC_MSMT_LEADCHNL_RA_IMPEDANCE_VALUE: 437
MDC_IDC_MSMT_LEADCHNL_RA_PACING_THRESHOLD_AMPLITUDE: 1.12
MDC_IDC_MSMT_LEADCHNL_RA_PACING_THRESHOLD_POLARITY: NORMAL
MDC_IDC_MSMT_LEADCHNL_RA_PACING_THRESHOLD_PULSEWIDTH: 0.4
MDC_IDC_MSMT_LEADCHNL_RA_SENSING_INTR_AMPL: 2.12
MDC_IDC_MSMT_LEADCHNL_RV_DTM: NORMAL
MDC_IDC_MSMT_LEADCHNL_RV_IMPEDANCE_VALUE: 665
MDC_IDC_MSMT_LEADCHNL_RV_PACING_THRESHOLD_AMPLITUDE: 0.38
MDC_IDC_MSMT_LEADCHNL_RV_PACING_THRESHOLD_POLARITY: NORMAL
MDC_IDC_MSMT_LEADCHNL_RV_PACING_THRESHOLD_PULSEWIDTH: 0.4
MDC_IDC_MSMT_LEADCHNL_RV_SENSING_INTR_AMPL: 17.25
MDC_IDC_PG_IMPLANT_DTM: NORMAL
MDC_IDC_PG_MFG: NORMAL
MDC_IDC_PG_MODEL: NORMAL
MDC_IDC_PG_SERIAL: NORMAL
MDC_IDC_PG_TYPE: NORMAL
MDC_IDC_SESS_DTM: NORMAL
MDC_IDC_SESS_TYPE: NORMAL
MDC_IDC_SET_BRADY_AT_MODE_SWITCH_RATE: 171
MDC_IDC_SET_BRADY_LOWRATE: 50
MDC_IDC_SET_BRADY_MAX_SENSOR_RATE: 130
MDC_IDC_SET_BRADY_MAX_TRACKING_RATE: 130
MDC_IDC_SET_BRADY_MODE: NORMAL
MDC_IDC_SET_BRADY_PAV_DELAY: 180
MDC_IDC_SET_BRADY_SAV_DELAY: 150
MDC_IDC_SET_LEADCHNL_RA_PACING_AMPLITUDE: 4
MDC_IDC_SET_LEADCHNL_RA_PACING_POLARITY: NORMAL
MDC_IDC_SET_LEADCHNL_RA_PACING_PULSEWIDTH: 0.4
MDC_IDC_SET_LEADCHNL_RA_SENSING_POLARITY: NORMAL
MDC_IDC_SET_LEADCHNL_RA_SENSING_SENSITIVITY: 0.3
MDC_IDC_SET_LEADCHNL_RV_PACING_AMPLITUDE: 3.5
MDC_IDC_SET_LEADCHNL_RV_PACING_POLARITY: NORMAL
MDC_IDC_SET_LEADCHNL_RV_PACING_PULSEWIDTH: 0.4
MDC_IDC_SET_LEADCHNL_RV_SENSING_POLARITY: NORMAL
MDC_IDC_SET_LEADCHNL_RV_SENSING_SENSITIVITY: 0.9
MDC_IDC_SET_ZONE_STATUS: NORMAL
MDC_IDC_SET_ZONE_STATUS: NORMAL
MDC_IDC_SET_ZONE_TYPE: NORMAL
MDC_IDC_SET_ZONE_TYPE: NORMAL
MDC_IDC_STAT_AT_BURDEN_PERCENT: 0
MDC_IDC_STAT_BRADY_RA_PERCENT_PACED: 0.09
MDC_IDC_STAT_BRADY_RV_PERCENT_PACED: 0.04

## 2025-07-22 ENCOUNTER — CLINICAL SUPPORT NO REQUIREMENTS (OUTPATIENT)
Age: 70
End: 2025-07-22
Payer: MEDICARE

## 2025-07-25 LAB
MC_CV_MDC_IDC_RATE_1: 150
MC_CV_MDC_IDC_RATE_1: 150
MC_CV_MDC_IDC_RATE_1: 171
MC_CV_MDC_IDC_ZONE_ID: 0
MC_CV_MDC_IDC_ZONE_ID: 2
MC_CV_MDC_IDC_ZONE_ID: 6
MDC_IDC_MSMT_BATTERY_REMAINING_LONGEVITY: 184 MO
MDC_IDC_MSMT_BATTERY_RRT_TRIGGER: 2.62
MDC_IDC_MSMT_BATTERY_RRT_TRIGGER: 2.63
MDC_IDC_MSMT_BATTERY_STATUS: NORMAL
MDC_IDC_MSMT_BATTERY_STATUS: NORMAL
MDC_IDC_MSMT_BATTERY_VOLTAGE: 3.21
MDC_IDC_MSMT_BATTERY_VOLTAGE: 3.23
MDC_IDC_MSMT_LEADCHNL_RA_DTM: NORMAL
MDC_IDC_MSMT_LEADCHNL_RA_DTM: NORMAL
MDC_IDC_MSMT_LEADCHNL_RA_IMPEDANCE_VALUE: 437
MDC_IDC_MSMT_LEADCHNL_RA_IMPEDANCE_VALUE: 437
MDC_IDC_MSMT_LEADCHNL_RA_PACING_THRESHOLD_AMPLITUDE: 1
MDC_IDC_MSMT_LEADCHNL_RA_PACING_THRESHOLD_AMPLITUDE: 1.12
MDC_IDC_MSMT_LEADCHNL_RA_PACING_THRESHOLD_POLARITY: NORMAL
MDC_IDC_MSMT_LEADCHNL_RA_PACING_THRESHOLD_PULSEWIDTH: 0.4
MDC_IDC_MSMT_LEADCHNL_RA_PACING_THRESHOLD_PULSEWIDTH: 0.4
MDC_IDC_MSMT_LEADCHNL_RA_SENSING_INTR_AMPL: 1.3
MDC_IDC_MSMT_LEADCHNL_RA_SENSING_INTR_AMPL: 2.12
MDC_IDC_MSMT_LEADCHNL_RV_DTM: NORMAL
MDC_IDC_MSMT_LEADCHNL_RV_DTM: NORMAL
MDC_IDC_MSMT_LEADCHNL_RV_IMPEDANCE_VALUE: 327
MDC_IDC_MSMT_LEADCHNL_RV_IMPEDANCE_VALUE: 665
MDC_IDC_MSMT_LEADCHNL_RV_PACING_THRESHOLD_AMPLITUDE: 0.38
MDC_IDC_MSMT_LEADCHNL_RV_PACING_THRESHOLD_AMPLITUDE: 0.75
MDC_IDC_MSMT_LEADCHNL_RV_PACING_THRESHOLD_POLARITY: NORMAL
MDC_IDC_MSMT_LEADCHNL_RV_PACING_THRESHOLD_PULSEWIDTH: 0.4
MDC_IDC_MSMT_LEADCHNL_RV_PACING_THRESHOLD_PULSEWIDTH: 0.4
MDC_IDC_MSMT_LEADCHNL_RV_SENSING_INTR_AMPL: 16.6
MDC_IDC_MSMT_LEADCHNL_RV_SENSING_INTR_AMPL: 17.25
MDC_IDC_PG_IMPLANT_DTM: NORMAL
MDC_IDC_PG_IMPLANT_DTM: NORMAL
MDC_IDC_PG_MFG: NORMAL
MDC_IDC_PG_MFG: NORMAL
MDC_IDC_PG_MODEL: NORMAL
MDC_IDC_PG_MODEL: NORMAL
MDC_IDC_PG_SERIAL: NORMAL
MDC_IDC_PG_SERIAL: NORMAL
MDC_IDC_PG_TYPE: NORMAL
MDC_IDC_PG_TYPE: NORMAL
MDC_IDC_SESS_DTM: NORMAL
MDC_IDC_SESS_DTM: NORMAL
MDC_IDC_SESS_TYPE: NORMAL
MDC_IDC_SET_BRADY_AT_MODE_SWITCH_RATE: 171
MDC_IDC_SET_BRADY_AT_MODE_SWITCH_RATE: 171
MDC_IDC_SET_BRADY_LOWRATE: 50
MDC_IDC_SET_BRADY_LOWRATE: 50
MDC_IDC_SET_BRADY_MAX_SENSOR_RATE: 130
MDC_IDC_SET_BRADY_MAX_SENSOR_RATE: 130
MDC_IDC_SET_BRADY_MAX_TRACKING_RATE: 130
MDC_IDC_SET_BRADY_MAX_TRACKING_RATE: 130
MDC_IDC_SET_BRADY_MODE: NORMAL
MDC_IDC_SET_BRADY_MODE: NORMAL
MDC_IDC_SET_BRADY_PAV_DELAY: 180
MDC_IDC_SET_BRADY_PAV_DELAY: 180
MDC_IDC_SET_BRADY_SAV_DELAY: 150
MDC_IDC_SET_BRADY_SAV_DELAY: 150
MDC_IDC_SET_LEADCHNL_RA_PACING_AMPLITUDE: 4
MDC_IDC_SET_LEADCHNL_RA_PACING_AMPLITUDE: 4
MDC_IDC_SET_LEADCHNL_RA_PACING_POLARITY: NORMAL
MDC_IDC_SET_LEADCHNL_RA_PACING_PULSEWIDTH: 0.4
MDC_IDC_SET_LEADCHNL_RA_PACING_PULSEWIDTH: 0.4
MDC_IDC_SET_LEADCHNL_RA_SENSING_POLARITY: NORMAL
MDC_IDC_SET_LEADCHNL_RA_SENSING_SENSITIVITY: 0.3
MDC_IDC_SET_LEADCHNL_RA_SENSING_SENSITIVITY: 0.3
MDC_IDC_SET_LEADCHNL_RV_PACING_AMPLITUDE: 3.5
MDC_IDC_SET_LEADCHNL_RV_PACING_AMPLITUDE: 3.5
MDC_IDC_SET_LEADCHNL_RV_PACING_POLARITY: NORMAL
MDC_IDC_SET_LEADCHNL_RV_PACING_PULSEWIDTH: 0.4
MDC_IDC_SET_LEADCHNL_RV_PACING_PULSEWIDTH: 0.4
MDC_IDC_SET_LEADCHNL_RV_SENSING_POLARITY: NORMAL
MDC_IDC_SET_LEADCHNL_RV_SENSING_SENSITIVITY: 0.9
MDC_IDC_SET_LEADCHNL_RV_SENSING_SENSITIVITY: 0.9
MDC_IDC_SET_ZONE_STATUS: NORMAL
MDC_IDC_SET_ZONE_TYPE: NORMAL
MDC_IDC_STAT_AT_BURDEN_PERCENT: 0
MDC_IDC_STAT_AT_BURDEN_PERCENT: 0
MDC_IDC_STAT_BRADY_RA_PERCENT_PACED: 0.09
MDC_IDC_STAT_BRADY_RA_PERCENT_PACED: 0.11
MDC_IDC_STAT_BRADY_RV_PERCENT_PACED: 0.04
MDC_IDC_STAT_BRADY_RV_PERCENT_PACED: 0.05

## 2025-08-02 DIAGNOSIS — F43.0 ANXIETY AS ACUTE REACTION TO EXCEPTIONAL STRESS: ICD-10-CM

## 2025-08-02 DIAGNOSIS — F41.1 ANXIETY AS ACUTE REACTION TO EXCEPTIONAL STRESS: ICD-10-CM

## 2025-08-02 DIAGNOSIS — G47.09 OTHER INSOMNIA: ICD-10-CM

## 2025-08-13 ENCOUNTER — OFFICE VISIT (OUTPATIENT)
Dept: CARDIOLOGY | Age: 70
End: 2025-08-13
Payer: MEDICARE

## 2025-08-13 VITALS
HEART RATE: 67 BPM | SYSTOLIC BLOOD PRESSURE: 148 MMHG | DIASTOLIC BLOOD PRESSURE: 86 MMHG | WEIGHT: 140 LBS | BODY MASS INDEX: 23.32 KG/M2 | HEIGHT: 65 IN

## 2025-08-13 DIAGNOSIS — E78.5 HYPERLIPIDEMIA, UNSPECIFIED HYPERLIPIDEMIA TYPE: ICD-10-CM

## 2025-08-13 DIAGNOSIS — I35.1 NONRHEUMATIC AORTIC VALVE INSUFFICIENCY: ICD-10-CM

## 2025-08-13 DIAGNOSIS — R42 DIZZINESS: ICD-10-CM

## 2025-08-13 DIAGNOSIS — I49.5 SICK SINUS SYNDROME: ICD-10-CM

## 2025-08-13 DIAGNOSIS — Z95.0 S/P PLACEMENT OF CARDIAC PACEMAKER: Primary | ICD-10-CM

## 2025-08-13 RX ORDER — ASPIRIN 81 MG/1
81 TABLET ORAL DAILY
COMMUNITY

## 2025-08-13 RX ORDER — LOSARTAN POTASSIUM 50 MG/1
50 TABLET ORAL DAILY
Qty: 90 TABLET | Refills: 1 | Status: SHIPPED | OUTPATIENT
Start: 2025-08-13

## 2025-08-19 ENCOUNTER — OFFICE VISIT (OUTPATIENT)
Age: 70
End: 2025-08-19
Payer: MEDICARE

## 2025-08-19 ENCOUNTER — HOSPITAL ENCOUNTER (OUTPATIENT)
Facility: HOSPITAL | Age: 70
Discharge: HOME | End: 2025-08-19
Admitting: NURSE PRACTITIONER
Payer: MEDICARE

## 2025-08-19 VITALS
SYSTOLIC BLOOD PRESSURE: 145 MMHG | BODY MASS INDEX: 23.32 KG/M2 | HEIGHT: 65 IN | WEIGHT: 140 LBS | DIASTOLIC BLOOD PRESSURE: 61 MMHG | HEART RATE: 55 BPM

## 2025-08-19 DIAGNOSIS — I83.813 VARICOSE VEINS OF LEG WITH PAIN, BILATERAL: ICD-10-CM

## 2025-08-19 DIAGNOSIS — I87.2 VENOUS (PERIPHERAL) INSUFFICIENCY: ICD-10-CM

## 2025-08-19 DIAGNOSIS — I83.813 VARICOSE VEINS OF LEG WITH PAIN, BILATERAL: Primary | ICD-10-CM

## 2025-08-19 DIAGNOSIS — I65.22 CAROTID STENOSIS, ASYMPTOMATIC, LEFT: ICD-10-CM

## 2025-08-19 LAB
BH CV LOW VAS RIGHT VARICOSITY AK VESSEL: 1
BH CV LOW VAS RIGHT VARICOSITY BK VESSEL: 1
BH CV LOWER VASCULAR LEFT COMMON FEMORAL AUGMENT: NORMAL
BH CV LOWER VASCULAR LEFT COMMON FEMORAL COMPETENT: NORMAL
BH CV LOWER VASCULAR LEFT COMMON FEMORAL COMPRESS: NORMAL
BH CV LOWER VASCULAR LEFT COMMON FEMORAL PHASIC: NORMAL
BH CV LOWER VASCULAR LEFT COMMON FEMORAL SPONT: NORMAL
BH CV LOWER VASCULAR RIGHT COMMON FEMORAL AUGMENT: NORMAL
BH CV LOWER VASCULAR RIGHT COMMON FEMORAL COMPETENT: NORMAL
BH CV LOWER VASCULAR RIGHT COMMON FEMORAL COMPRESS: NORMAL
BH CV LOWER VASCULAR RIGHT COMMON FEMORAL PHASIC: NORMAL
BH CV LOWER VASCULAR RIGHT COMMON FEMORAL SPONT: NORMAL
BH CV LOWER VASCULAR RIGHT DISTAL FEMORAL COMPRESS: NORMAL
BH CV LOWER VASCULAR RIGHT EXTERNAL ILIAC AUGMENT: NORMAL
BH CV LOWER VASCULAR RIGHT EXTERNAL ILIAC COMPETENT: NORMAL
BH CV LOWER VASCULAR RIGHT EXTERNAL ILIAC COMPRESS: NORMAL
BH CV LOWER VASCULAR RIGHT EXTERNAL ILIAC PHASIC: NORMAL
BH CV LOWER VASCULAR RIGHT EXTERNAL ILIAC SPONT: NORMAL
BH CV LOWER VASCULAR RIGHT GASTRONEMIUS COMPRESS: NORMAL
BH CV LOWER VASCULAR RIGHT GREATER SAPH AK COMPRESS: NORMAL
BH CV LOWER VASCULAR RIGHT GREATER SAPH BK COMPRESS: NORMAL
BH CV LOWER VASCULAR RIGHT LESSER SAPH COMPRESS: NORMAL
BH CV LOWER VASCULAR RIGHT MID FEMORAL AUGMENT: NORMAL
BH CV LOWER VASCULAR RIGHT MID FEMORAL COMPETENT: NORMAL
BH CV LOWER VASCULAR RIGHT MID FEMORAL COMPRESS: NORMAL
BH CV LOWER VASCULAR RIGHT MID FEMORAL PHASIC: NORMAL
BH CV LOWER VASCULAR RIGHT MID FEMORAL SPONT: NORMAL
BH CV LOWER VASCULAR RIGHT PERONEAL COMPRESS: NORMAL
BH CV LOWER VASCULAR RIGHT POPLITEAL AUGMENT: NORMAL
BH CV LOWER VASCULAR RIGHT POPLITEAL COMPETENT: NORMAL
BH CV LOWER VASCULAR RIGHT POPLITEAL COMPRESS: NORMAL
BH CV LOWER VASCULAR RIGHT POPLITEAL PHASIC: NORMAL
BH CV LOWER VASCULAR RIGHT POPLITEAL SPONT: NORMAL
BH CV LOWER VASCULAR RIGHT POSTERIOR TIBIAL COMPRESS: NORMAL
BH CV LOWER VASCULAR RIGHT PROFUNDA FEMORAL COMPRESS: NORMAL
BH CV LOWER VASCULAR RIGHT PROXIMAL FEMORAL COMPRESS: NORMAL
BH CV LOWER VASCULAR RIGHT SAPHENOFEMORAL JUNCTION COMPRESS: NORMAL
BH CV LOWER VASCULAR RIGHT VARICOSITY AK COMPRESS: NORMAL
BH CV LOWER VASCULAR RIGHT VARICOSITY BK COMPRESS: NORMAL

## 2025-08-19 PROCEDURE — 93971 EXTREMITY STUDY: CPT

## (undated) DEVICE — LOU PACE DEFIB: Brand: MEDLINE INDUSTRIES, INC.

## (undated) DEVICE — INTRO SHEATH PRELUDE SNAP .038 7F 13CM W/SDPRT

## (undated) DEVICE — TUBING, SUCTION, 1/4" X 10', STRAIGHT: Brand: MEDLINE

## (undated) DEVICE — BITEBLOCK OMNI BLOC

## (undated) DEVICE — SENSR O2 OXIMAX FNGR A/ 18IN NONSTR

## (undated) DEVICE — CANN O2 ETCO2 FITS ALL CONN CO2 SMPL A/ 7IN DISP LF

## (undated) DEVICE — LN SMPL CO2 SHTRM SD STREAM W/M LUER

## (undated) DEVICE — INTRO SHEATH PRELUDE SNAP .038 9F 13CM W/SDPRT BLK

## (undated) DEVICE — PHOTONBLADE 3 SMOKE EVACUATION. MONOPOLAR SMOKE EVACUATION PENCIL: Brand: PHOTONBLADE

## (undated) DEVICE — THE TORRENT IRRIGATION SCOPE CONNECTOR IS USED WITH THE TORRENT IRRIGATION TUBING TO PROVIDE IRRIGATION FLUIDS SUCH AS STERILE WATER DURING GASTROINTESTINAL ENDOSCOPIC PROCEDURES WHEN USED IN CONJUNCTION WITH AN IRRIGATION PUMP (OR ELECTROSURGICAL UNIT).: Brand: TORRENT

## (undated) DEVICE — KT ORCA ORCAPOD DISP STRL

## (undated) DEVICE — ADAPT SEAL SAFESHEATH

## (undated) DEVICE — INTRO SHEATH PRELUDE SNAP .038 7F 25CM W/SDPRT ORNG

## (undated) DEVICE — RADIFOCUS GLIDEWIRE: Brand: GLIDEWIRE

## (undated) DEVICE — GOWN ,SIRUS,NONREINFORCED SMALL: Brand: MEDLINE

## (undated) DEVICE — SINGLE-USE BIOPSY FORCEPS: Brand: RADIAL JAW 4

## (undated) DEVICE — ADAPT CLN BIOGUARD AIR/H2O DISP

## (undated) DEVICE — CATH DEFLECT SELECTSITE 9F 43CM W/ART HNDL